# Patient Record
Sex: FEMALE | Race: WHITE | NOT HISPANIC OR LATINO | Employment: OTHER | URBAN - METROPOLITAN AREA
[De-identification: names, ages, dates, MRNs, and addresses within clinical notes are randomized per-mention and may not be internally consistent; named-entity substitution may affect disease eponyms.]

---

## 2022-10-18 ENCOUNTER — APPOINTMENT (OUTPATIENT)
Dept: CT IMAGING | Facility: HOSPITAL | Age: 82
End: 2022-10-18

## 2022-10-18 ENCOUNTER — HOSPITAL ENCOUNTER (INPATIENT)
Facility: HOSPITAL | Age: 82
LOS: 15 days | Discharge: NURSING, REHAB OR LONG-TERM CARE | End: 2022-11-02
Attending: EMERGENCY MEDICINE | Admitting: INTERNAL MEDICINE

## 2022-10-18 ENCOUNTER — APPOINTMENT (OUTPATIENT)
Dept: GENERAL RADIOLOGY | Facility: HOSPITAL | Age: 82
End: 2022-10-18

## 2022-10-18 DIAGNOSIS — G93.40 ACUTE ENCEPHALOPATHY: ICD-10-CM

## 2022-10-18 DIAGNOSIS — A41.9 SEPSIS, DUE TO UNSPECIFIED ORGANISM, UNSPECIFIED WHETHER ACUTE ORGAN DYSFUNCTION PRESENT: ICD-10-CM

## 2022-10-18 DIAGNOSIS — E22.2 SIADH (SYNDROME OF INAPPROPRIATE ADH PRODUCTION): ICD-10-CM

## 2022-10-18 DIAGNOSIS — E87.1 HYPONATREMIA: Primary | ICD-10-CM

## 2022-10-18 PROBLEM — R80.9 PROTEINURIA: Status: ACTIVE | Noted: 2022-10-18

## 2022-10-18 PROBLEM — E88.09 HYPOALBUMINEMIA: Status: ACTIVE | Noted: 2022-10-18

## 2022-10-18 PROBLEM — D72.829 LEUKOCYTOSIS: Status: ACTIVE | Noted: 2022-10-18

## 2022-10-18 PROBLEM — R74.8 ELEVATED LIVER ENZYMES: Status: ACTIVE | Noted: 2022-10-18

## 2022-10-18 PROBLEM — I48.0 PAROXYSMAL ATRIAL FIBRILLATION: Status: ACTIVE | Noted: 2022-10-18

## 2022-10-18 PROBLEM — I10 ESSENTIAL HYPERTENSION: Status: ACTIVE | Noted: 2022-10-18

## 2022-10-18 PROBLEM — N39.0 UTI (URINARY TRACT INFECTION): Status: ACTIVE | Noted: 2022-10-18

## 2022-10-18 LAB
ALBUMIN SERPL-MCNC: 2.7 G/DL (ref 3.5–5.2)
ALBUMIN/GLOB SERPL: 0.8 G/DL
ALP SERPL-CCNC: 126 U/L (ref 39–117)
ALT SERPL W P-5'-P-CCNC: 82 U/L (ref 1–33)
ANION GAP SERPL CALCULATED.3IONS-SCNC: 15 MMOL/L (ref 5–15)
AST SERPL-CCNC: 48 U/L (ref 1–32)
BACTERIA UR QL AUTO: ABNORMAL /HPF
BASOPHILS # BLD AUTO: 0.03 10*3/MM3 (ref 0–0.2)
BASOPHILS NFR BLD AUTO: 0.2 % (ref 0–1.5)
BILIRUB SERPL-MCNC: 1 MG/DL (ref 0–1.2)
BILIRUB UR QL STRIP: NEGATIVE
BUN SERPL-MCNC: 10 MG/DL (ref 8–23)
BUN/CREAT SERPL: 22.2 (ref 7–25)
BURR CELLS BLD QL SMEAR: NORMAL
CALCIUM SPEC-SCNC: 8.3 MG/DL (ref 8.6–10.5)
CHLORIDE SERPL-SCNC: 79 MMOL/L (ref 98–107)
CLARITY UR: ABNORMAL
CLUMPED PLATELETS: PRESENT
CO2 SERPL-SCNC: 23 MMOL/L (ref 22–29)
COLOR UR: ABNORMAL
CREAT SERPL-MCNC: 0.45 MG/DL (ref 0.57–1)
D-LACTATE SERPL-SCNC: 1.6 MMOL/L (ref 0.5–2)
DEPRECATED RDW RBC AUTO: 45.6 FL (ref 37–54)
EGFRCR SERPLBLD CKD-EPI 2021: 97.4 ML/MIN/1.73
EOSINOPHIL # BLD AUTO: 0.28 10*3/MM3 (ref 0–0.4)
EOSINOPHIL NFR BLD AUTO: 2.2 % (ref 0.3–6.2)
ERYTHROCYTE [DISTWIDTH] IN BLOOD BY AUTOMATED COUNT: 15.9 % (ref 12.3–15.4)
FLUAV SUBTYP SPEC NAA+PROBE: NOT DETECTED
FLUBV RNA ISLT QL NAA+PROBE: NOT DETECTED
GLOBULIN UR ELPH-MCNC: 3.3 GM/DL
GLUCOSE BLDC GLUCOMTR-MCNC: 93 MG/DL (ref 70–130)
GLUCOSE SERPL-MCNC: 106 MG/DL (ref 65–99)
GLUCOSE UR STRIP-MCNC: NEGATIVE MG/DL
HCT VFR BLD AUTO: 40.5 % (ref 34–46.6)
HGB BLD-MCNC: 14.1 G/DL (ref 12–15.9)
HGB UR QL STRIP.AUTO: ABNORMAL
HOLD SPECIMEN: NORMAL
HYALINE CASTS UR QL AUTO: ABNORMAL /LPF
IMM GRANULOCYTES # BLD AUTO: 0.13 10*3/MM3 (ref 0–0.05)
IMM GRANULOCYTES NFR BLD AUTO: 1 % (ref 0–0.5)
KETONES UR QL STRIP: ABNORMAL
LARGE PLATELETS: NORMAL
LEUKOCYTE ESTERASE UR QL STRIP.AUTO: ABNORMAL
LYMPHOCYTES # BLD AUTO: 0.95 10*3/MM3 (ref 0.7–3.1)
LYMPHOCYTES NFR BLD AUTO: 7.5 % (ref 19.6–45.3)
MCH RBC QN AUTO: 27.4 PG (ref 26.6–33)
MCHC RBC AUTO-ENTMCNC: 34.8 G/DL (ref 31.5–35.7)
MCV RBC AUTO: 78.8 FL (ref 79–97)
MONOCYTES # BLD AUTO: 0.4 10*3/MM3 (ref 0.1–0.9)
MONOCYTES NFR BLD AUTO: 3.1 % (ref 5–12)
NEUTROPHILS NFR BLD AUTO: 10.92 10*3/MM3 (ref 1.7–7)
NEUTROPHILS NFR BLD AUTO: 86 % (ref 42.7–76)
NITRITE UR QL STRIP: POSITIVE
NRBC BLD AUTO-RTO: 0 /100 WBC (ref 0–0.2)
OSMOLALITY SERPL: 243 MOSM/KG (ref 275–295)
OSMOLALITY UR: 731 MOSM/KG (ref 300–1100)
PH UR STRIP.AUTO: 7.5 [PH] (ref 5–8)
PLATELET # BLD AUTO: 209 10*3/MM3 (ref 140–450)
PMV BLD AUTO: 9.2 FL (ref 6–12)
POTASSIUM SERPL-SCNC: 3.6 MMOL/L (ref 3.5–5.2)
POTASSIUM SERPL-SCNC: 3.8 MMOL/L (ref 3.5–5.2)
PROCALCITONIN SERPL-MCNC: 0.3 NG/ML (ref 0–0.25)
PROT SERPL-MCNC: 6 G/DL (ref 6–8.5)
PROT UR QL STRIP: ABNORMAL
QT INTERVAL: 356 MS
QTC INTERVAL: 420 MS
RBC # BLD AUTO: 5.14 10*6/MM3 (ref 3.77–5.28)
RBC # UR STRIP: ABNORMAL /HPF
REF LAB TEST METHOD: ABNORMAL
RENAL EPI CELLS #/AREA URNS HPF: ABNORMAL /HPF
SARS-COV-2 RNA PNL SPEC NAA+PROBE: NOT DETECTED
SMALL PLATELETS BLD QL SMEAR: ADEQUATE
SODIUM SERPL-SCNC: 117 MMOL/L (ref 136–145)
SODIUM UR-SCNC: 55 MMOL/L
SP GR UR STRIP: 1.02 (ref 1–1.03)
SQUAMOUS #/AREA URNS HPF: ABNORMAL /HPF
TROPONIN T SERPL-MCNC: <0.01 NG/ML (ref 0–0.03)
UROBILINOGEN UR QL STRIP: ABNORMAL
WBC # UR STRIP: ABNORMAL /HPF
WBC MORPH BLD: NORMAL
WBC NRBC COR # BLD: 12.71 10*3/MM3 (ref 3.4–10.8)
WHOLE BLOOD HOLD COAG: NORMAL
WHOLE BLOOD HOLD SPECIMEN: NORMAL

## 2022-10-18 PROCEDURE — 25010000002 ENOXAPARIN PER 10 MG: Performed by: NURSE PRACTITIONER

## 2022-10-18 PROCEDURE — 99291 CRITICAL CARE FIRST HOUR: CPT | Performed by: INTERNAL MEDICINE

## 2022-10-18 PROCEDURE — 82962 GLUCOSE BLOOD TEST: CPT

## 2022-10-18 PROCEDURE — 83930 ASSAY OF BLOOD OSMOLALITY: CPT | Performed by: EMERGENCY MEDICINE

## 2022-10-18 PROCEDURE — 83935 ASSAY OF URINE OSMOLALITY: CPT | Performed by: EMERGENCY MEDICINE

## 2022-10-18 PROCEDURE — 87040 BLOOD CULTURE FOR BACTERIA: CPT | Performed by: EMERGENCY MEDICINE

## 2022-10-18 PROCEDURE — 87636 SARSCOV2 & INF A&B AMP PRB: CPT | Performed by: EMERGENCY MEDICINE

## 2022-10-18 PROCEDURE — 87077 CULTURE AEROBIC IDENTIFY: CPT | Performed by: EMERGENCY MEDICINE

## 2022-10-18 PROCEDURE — 80047 BASIC METABLC PNL IONIZED CA: CPT

## 2022-10-18 PROCEDURE — 25010000002 CEFTRIAXONE PER 250 MG: Performed by: NURSE PRACTITIONER

## 2022-10-18 PROCEDURE — 85007 BL SMEAR W/DIFF WBC COUNT: CPT | Performed by: EMERGENCY MEDICINE

## 2022-10-18 PROCEDURE — 84300 ASSAY OF URINE SODIUM: CPT | Performed by: EMERGENCY MEDICINE

## 2022-10-18 PROCEDURE — 80053 COMPREHEN METABOLIC PANEL: CPT | Performed by: EMERGENCY MEDICINE

## 2022-10-18 PROCEDURE — 70450 CT HEAD/BRAIN W/O DYE: CPT

## 2022-10-18 PROCEDURE — 85014 HEMATOCRIT: CPT

## 2022-10-18 PROCEDURE — 84484 ASSAY OF TROPONIN QUANT: CPT | Performed by: INTERNAL MEDICINE

## 2022-10-18 PROCEDURE — 84145 PROCALCITONIN (PCT): CPT | Performed by: EMERGENCY MEDICINE

## 2022-10-18 PROCEDURE — 36415 COLL VENOUS BLD VENIPUNCTURE: CPT

## 2022-10-18 PROCEDURE — 87150 DNA/RNA AMPLIFIED PROBE: CPT | Performed by: EMERGENCY MEDICINE

## 2022-10-18 PROCEDURE — 87186 SC STD MICRODIL/AGAR DIL: CPT | Performed by: EMERGENCY MEDICINE

## 2022-10-18 PROCEDURE — 93005 ELECTROCARDIOGRAM TRACING: CPT | Performed by: EMERGENCY MEDICINE

## 2022-10-18 PROCEDURE — 25010000002 CEFTRIAXONE PER 250 MG: Performed by: EMERGENCY MEDICINE

## 2022-10-18 PROCEDURE — 99285 EMERGENCY DEPT VISIT HI MDM: CPT

## 2022-10-18 PROCEDURE — 83605 ASSAY OF LACTIC ACID: CPT | Performed by: EMERGENCY MEDICINE

## 2022-10-18 PROCEDURE — 85025 COMPLETE CBC W/AUTO DIFF WBC: CPT | Performed by: EMERGENCY MEDICINE

## 2022-10-18 PROCEDURE — 84132 ASSAY OF SERUM POTASSIUM: CPT | Performed by: NURSE PRACTITIONER

## 2022-10-18 PROCEDURE — 71045 X-RAY EXAM CHEST 1 VIEW: CPT

## 2022-10-18 PROCEDURE — 81001 URINALYSIS AUTO W/SCOPE: CPT | Performed by: EMERGENCY MEDICINE

## 2022-10-18 PROCEDURE — 87147 CULTURE TYPE IMMUNOLOGIC: CPT | Performed by: EMERGENCY MEDICINE

## 2022-10-18 PROCEDURE — 87086 URINE CULTURE/COLONY COUNT: CPT | Performed by: EMERGENCY MEDICINE

## 2022-10-18 RX ORDER — ALBUTEROL SULFATE 2.5 MG/3ML
2.5 SOLUTION RESPIRATORY (INHALATION) EVERY 6 HOURS PRN
Status: DISCONTINUED | OUTPATIENT
Start: 2022-10-18 | End: 2022-11-02 | Stop reason: HOSPADM

## 2022-10-18 RX ORDER — SODIUM CHLORIDE 0.9 % (FLUSH) 0.9 %
10 SYRINGE (ML) INJECTION EVERY 12 HOURS SCHEDULED
Status: DISCONTINUED | OUTPATIENT
Start: 2022-10-18 | End: 2022-11-02 | Stop reason: HOSPADM

## 2022-10-18 RX ORDER — FUROSEMIDE 40 MG/1
1 TABLET ORAL DAILY
Status: ON HOLD | COMMUNITY
End: 2022-10-19

## 2022-10-18 RX ORDER — PANTOPRAZOLE SODIUM 40 MG/10ML
40 INJECTION, POWDER, LYOPHILIZED, FOR SOLUTION INTRAVENOUS
Status: DISCONTINUED | OUTPATIENT
Start: 2022-10-19 | End: 2022-10-22 | Stop reason: ALTCHOICE

## 2022-10-18 RX ORDER — MAGNESIUM SULFATE HEPTAHYDRATE 40 MG/ML
4 INJECTION, SOLUTION INTRAVENOUS AS NEEDED
Status: DISCONTINUED | OUTPATIENT
Start: 2022-10-18 | End: 2022-11-02 | Stop reason: HOSPADM

## 2022-10-18 RX ORDER — SODIUM CHLORIDE 9 MG/ML
150 INJECTION, SOLUTION INTRAVENOUS CONTINUOUS
Status: DISCONTINUED | OUTPATIENT
Start: 2022-10-18 | End: 2022-10-23

## 2022-10-18 RX ORDER — ONDANSETRON 4 MG/1
4 TABLET, FILM COATED ORAL EVERY 6 HOURS PRN
Status: DISCONTINUED | OUTPATIENT
Start: 2022-10-18 | End: 2022-11-02 | Stop reason: HOSPADM

## 2022-10-18 RX ORDER — ATORVASTATIN CALCIUM 20 MG/1
1 TABLET, FILM COATED ORAL DAILY
COMMUNITY

## 2022-10-18 RX ORDER — MAGNESIUM SULFATE HEPTAHYDRATE 40 MG/ML
2 INJECTION, SOLUTION INTRAVENOUS AS NEEDED
Status: DISCONTINUED | OUTPATIENT
Start: 2022-10-18 | End: 2022-11-02 | Stop reason: HOSPADM

## 2022-10-18 RX ORDER — FENTANYL/ROPIVACAINE/NS/PF 2-625MCG/1
15 PLASTIC BAG, INJECTION (ML) EPIDURAL AS NEEDED
Status: DISCONTINUED | OUTPATIENT
Start: 2022-10-18 | End: 2022-11-02 | Stop reason: HOSPADM

## 2022-10-18 RX ORDER — CALCIUM GLUCONATE 20 MG/ML
1 INJECTION, SOLUTION INTRAVENOUS AS NEEDED
Status: DISCONTINUED | OUTPATIENT
Start: 2022-10-18 | End: 2022-11-02 | Stop reason: HOSPADM

## 2022-10-18 RX ORDER — POTASSIUM CHLORIDE 750 MG/1
40 CAPSULE, EXTENDED RELEASE ORAL AS NEEDED
Status: DISCONTINUED | OUTPATIENT
Start: 2022-10-18 | End: 2022-11-02 | Stop reason: HOSPADM

## 2022-10-18 RX ORDER — ALBUTEROL SULFATE 90 UG/1
2 AEROSOL, METERED RESPIRATORY (INHALATION) EVERY 4 HOURS PRN
COMMUNITY

## 2022-10-18 RX ORDER — ENOXAPARIN SODIUM 100 MG/ML
40 INJECTION SUBCUTANEOUS EVERY 24 HOURS
Status: DISCONTINUED | OUTPATIENT
Start: 2022-10-18 | End: 2022-10-24

## 2022-10-18 RX ORDER — ONDANSETRON 2 MG/ML
4 INJECTION INTRAMUSCULAR; INTRAVENOUS EVERY 6 HOURS PRN
Status: DISCONTINUED | OUTPATIENT
Start: 2022-10-18 | End: 2022-11-02 | Stop reason: HOSPADM

## 2022-10-18 RX ORDER — PREDNISONE 10 MG/1
1 TABLET ORAL DAILY
Qty: 30 TABLET | Refills: 2 | COMMUNITY
Start: 2022-08-31 | End: 2022-11-02 | Stop reason: HOSPADM

## 2022-10-18 RX ORDER — METOPROLOL TARTRATE 5 MG/5ML
5 INJECTION INTRAVENOUS ONCE
Status: COMPLETED | OUTPATIENT
Start: 2022-10-18 | End: 2022-10-18

## 2022-10-18 RX ORDER — POTASSIUM CHLORIDE 1.5 G/1.77G
40 POWDER, FOR SOLUTION ORAL AS NEEDED
Status: DISCONTINUED | OUTPATIENT
Start: 2022-10-18 | End: 2022-11-02 | Stop reason: HOSPADM

## 2022-10-18 RX ORDER — DILTIAZEM HCL IN NACL,ISO-OSM 125 MG/125
5-15 PLASTIC BAG, INJECTION (ML) INTRAVENOUS CONTINUOUS
Status: DISCONTINUED | OUTPATIENT
Start: 2022-10-18 | End: 2022-10-20

## 2022-10-18 RX ORDER — VITAMIN B COMPLEX
1 CAPSULE ORAL DAILY
COMMUNITY

## 2022-10-18 RX ORDER — BISOPROLOL FUMARATE AND HYDROCHLOROTHIAZIDE 10; 6.25 MG/1; MG/1
1 TABLET ORAL DAILY
COMMUNITY
End: 2022-11-02 | Stop reason: HOSPADM

## 2022-10-18 RX ORDER — SODIUM CHLORIDE 0.9 % (FLUSH) 0.9 %
10 SYRINGE (ML) INJECTION AS NEEDED
Status: DISCONTINUED | OUTPATIENT
Start: 2022-10-18 | End: 2022-11-02 | Stop reason: HOSPADM

## 2022-10-18 RX ORDER — METHYLPREDNISOLONE SODIUM SUCCINATE 40 MG/ML
40 INJECTION, POWDER, LYOPHILIZED, FOR SOLUTION INTRAMUSCULAR; INTRAVENOUS EVERY 8 HOURS SCHEDULED
Status: DISCONTINUED | OUTPATIENT
Start: 2022-10-18 | End: 2022-10-20

## 2022-10-18 RX ORDER — POTASSIUM CHLORIDE 7.45 MG/ML
10 INJECTION INTRAVENOUS
Status: DISCONTINUED | OUTPATIENT
Start: 2022-10-18 | End: 2022-11-02 | Stop reason: HOSPADM

## 2022-10-18 RX ORDER — POTASSIUM CHLORIDE 750 MG/1
1 TABLET, EXTENDED RELEASE ORAL DAILY
COMMUNITY
End: 2022-11-02 | Stop reason: HOSPADM

## 2022-10-18 RX ORDER — FLUTICASONE PROPIONATE AND SALMETEROL 250; 50 UG/1; UG/1
1 POWDER RESPIRATORY (INHALATION) 2 TIMES DAILY
COMMUNITY

## 2022-10-18 RX ADMIN — ENOXAPARIN SODIUM 40 MG: 40 INJECTION SUBCUTANEOUS at 20:58

## 2022-10-18 RX ADMIN — SODIUM CHLORIDE 1 G: 900 INJECTION INTRAVENOUS at 20:58

## 2022-10-18 RX ADMIN — SODIUM CHLORIDE 100 ML/HR: 9 INJECTION, SOLUTION INTRAVENOUS at 23:41

## 2022-10-18 RX ADMIN — Medication 5 MG/HR: at 22:44

## 2022-10-18 RX ADMIN — SODIUM BICARBONATE 50 MEQ: 84 INJECTION INTRAVENOUS at 18:45

## 2022-10-18 RX ADMIN — SODIUM CHLORIDE 2 G: 900 INJECTION INTRAVENOUS at 18:45

## 2022-10-18 RX ADMIN — METOPROLOL TARTRATE 5 MG: 5 INJECTION INTRAVENOUS at 21:23

## 2022-10-19 ENCOUNTER — APPOINTMENT (OUTPATIENT)
Dept: CARDIOLOGY | Facility: HOSPITAL | Age: 82
End: 2022-10-19

## 2022-10-19 LAB
ANION GAP SERPL CALCULATED.3IONS-SCNC: 10 MMOL/L (ref 5–15)
BACTERIA BLD CULT: ABNORMAL
BH CV ECHO MEAS - AO MAX PG: 11.1 MMHG
BH CV ECHO MEAS - AO MEAN PG: 5.7 MMHG
BH CV ECHO MEAS - AO ROOT DIAM: 3.2 CM
BH CV ECHO MEAS - AO V2 MAX: 166.8 CM/SEC
BH CV ECHO MEAS - AO V2 VTI: 26.8 CM
BH CV ECHO MEAS - AVA(I,D): 2.45 CM2
BH CV ECHO MEAS - EDV(CUBED): 134.8 ML
BH CV ECHO MEAS - EDV(MOD-SP2): 84.2 ML
BH CV ECHO MEAS - EDV(MOD-SP4): 143 ML
BH CV ECHO MEAS - EF(MOD-BP): 55.5 %
BH CV ECHO MEAS - EF(MOD-SP2): 53.8 %
BH CV ECHO MEAS - EF(MOD-SP4): 60.8 %
BH CV ECHO MEAS - ESV(CUBED): 37.7 ML
BH CV ECHO MEAS - ESV(MOD-SP2): 38.9 ML
BH CV ECHO MEAS - ESV(MOD-SP4): 56.1 ML
BH CV ECHO MEAS - FS: 34.6 %
BH CV ECHO MEAS - IVS/LVPW: 1.18 CM
BH CV ECHO MEAS - IVSD: 0.75 CM
BH CV ECHO MEAS - LA DIMENSION: 2.8 CM
BH CV ECHO MEAS - LAT PEAK E' VEL: 9.8 CM/SEC
BH CV ECHO MEAS - LV DIASTOLIC VOL/BSA (35-75): 67.4 CM2
BH CV ECHO MEAS - LV MASS(C)D: 118.5 GRAMS
BH CV ECHO MEAS - LV MAX PG: 6.1 MMHG
BH CV ECHO MEAS - LV MEAN PG: 2.7 MMHG
BH CV ECHO MEAS - LV SYSTOLIC VOL/BSA (12-30): 26.4 CM2
BH CV ECHO MEAS - LV V1 MAX: 123 CM/SEC
BH CV ECHO MEAS - LV V1 VTI: 21.9 CM
BH CV ECHO MEAS - LVIDD: 5.1 CM
BH CV ECHO MEAS - LVIDS: 3.4 CM
BH CV ECHO MEAS - LVOT AREA: 3 CM2
BH CV ECHO MEAS - LVOT DIAM: 1.95 CM
BH CV ECHO MEAS - LVPWD: 0.64 CM
BH CV ECHO MEAS - MED PEAK E' VEL: 7.6 CM/SEC
BH CV ECHO MEAS - MV A MAX VEL: 53.6 CM/SEC
BH CV ECHO MEAS - MV DEC SLOPE: 396 CM/SEC2
BH CV ECHO MEAS - MV DEC TIME: 0.19 MSEC
BH CV ECHO MEAS - MV E MAX VEL: 76.7 CM/SEC
BH CV ECHO MEAS - MV E/A: 1.43
BH CV ECHO MEAS - PA ACC TIME: 0.07 SEC
BH CV ECHO MEAS - PA PR(ACCEL): 47.3 MMHG
BH CV ECHO MEAS - RAP SYSTOLE: 8 MMHG
BH CV ECHO MEAS - RVSP: 28 MMHG
BH CV ECHO MEAS - SI(MOD-SP2): 21.4 ML/M2
BH CV ECHO MEAS - SI(MOD-SP4): 41 ML/M2
BH CV ECHO MEAS - SV(LVOT): 65.6 ML
BH CV ECHO MEAS - SV(MOD-SP2): 45.3 ML
BH CV ECHO MEAS - SV(MOD-SP4): 86.9 ML
BH CV ECHO MEAS - TAPSE (>1.6): 1.36 CM
BH CV ECHO MEAS - TR MAX PG: 17.8 MMHG
BH CV ECHO MEAS - TR MAX VEL: 210.4 CM/SEC
BH CV ECHO MEASUREMENTS AVERAGE E/E' RATIO: 8.82
BH CV VAS BP RIGHT ARM: NORMAL MMHG
BH CV XLRA - RV BASE: 3.8 CM
BH CV XLRA - RV LENGTH: 7.4 CM
BH CV XLRA - RV MID: 3.1 CM
BH CV XLRA - TDI S': 10 CM/SEC
BUN BLDA-MCNC: 9 MG/DL (ref 8–26)
BUN SERPL-MCNC: 10 MG/DL (ref 8–23)
BUN/CREAT SERPL: 24.4 (ref 7–25)
CA-I BLDA-SCNC: 1.07 MMOL/L (ref 1.2–1.32)
CA-I SERPL ISE-MCNC: 1.09 MMOL/L (ref 1.12–1.32)
CALCIUM SPEC-SCNC: 7.7 MG/DL (ref 8.6–10.5)
CHLORIDE BLDA-SCNC: 77 MMOL/L (ref 98–109)
CHLORIDE SERPL-SCNC: 83 MMOL/L (ref 98–107)
CO2 BLDA-SCNC: 27 MMOL/L (ref 24–29)
CO2 SERPL-SCNC: 26 MMOL/L (ref 22–29)
CREAT BLDA-MCNC: 0.4 MG/DL (ref 0.6–1.3)
CREAT SERPL-MCNC: 0.41 MG/DL (ref 0.57–1)
DEPRECATED RDW RBC AUTO: 46.5 FL (ref 37–54)
EGFRCR SERPLBLD CKD-EPI 2021: 100.2 ML/MIN/1.73
EGFRCR SERPLBLD CKD-EPI 2021: 99.6 ML/MIN/1.73
ERYTHROCYTE [DISTWIDTH] IN BLOOD BY AUTOMATED COUNT: 16.2 % (ref 12.3–15.4)
GLUCOSE BLDC GLUCOMTR-MCNC: 103 MG/DL (ref 70–130)
GLUCOSE BLDC GLUCOMTR-MCNC: 115 MG/DL (ref 70–130)
GLUCOSE BLDC GLUCOMTR-MCNC: 233 MG/DL (ref 70–130)
GLUCOSE BLDC GLUCOMTR-MCNC: 240 MG/DL (ref 70–130)
GLUCOSE BLDC GLUCOMTR-MCNC: 81 MG/DL (ref 70–130)
GLUCOSE SERPL-MCNC: 93 MG/DL (ref 65–99)
HCT VFR BLD AUTO: 34.4 % (ref 34–46.6)
HCT VFR BLDA CALC: 44 % (ref 38–51)
HGB BLD-MCNC: 12 G/DL (ref 12–15.9)
HGB BLDA-MCNC: 15 G/DL (ref 12–17)
LEFT ATRIUM VOLUME INDEX: 23.4 ML/M2
LV EF 2D ECHO EST: 55 %
MAGNESIUM SERPL-MCNC: 2.4 MG/DL (ref 1.6–2.4)
MAXIMAL PREDICTED HEART RATE: 140 BPM
MCH RBC QN AUTO: 27.4 PG (ref 26.6–33)
MCHC RBC AUTO-ENTMCNC: 34.9 G/DL (ref 31.5–35.7)
MCV RBC AUTO: 78.5 FL (ref 79–97)
PHOSPHATE SERPL-MCNC: 3.2 MG/DL (ref 2.5–4.5)
PLATELET # BLD AUTO: 209 10*3/MM3 (ref 140–450)
PMV BLD AUTO: 9.4 FL (ref 6–12)
POTASSIUM BLDA-SCNC: 3.7 MMOL/L (ref 3.5–4.9)
POTASSIUM SERPL-SCNC: 3.4 MMOL/L (ref 3.5–5.2)
POTASSIUM SERPL-SCNC: 4.1 MMOL/L (ref 3.5–5.2)
QT INTERVAL: 376 MS
QTC INTERVAL: 436 MS
RBC # BLD AUTO: 4.38 10*6/MM3 (ref 3.77–5.28)
SODIUM BLD-SCNC: 117 MMOL/L (ref 138–146)
SODIUM SERPL-SCNC: 116 MMOL/L (ref 136–145)
SODIUM SERPL-SCNC: 117 MMOL/L (ref 136–145)
SODIUM SERPL-SCNC: 117 MMOL/L (ref 136–145)
SODIUM SERPL-SCNC: 119 MMOL/L (ref 136–145)
STRESS TARGET HR: 119 BPM
T4 FREE SERPL-MCNC: 1.38 NG/DL (ref 0.93–1.7)
TSH SERPL DL<=0.05 MIU/L-ACNC: 1.83 UIU/ML (ref 0.27–4.2)
WBC NRBC COR # BLD: 11.5 10*3/MM3 (ref 3.4–10.8)

## 2022-10-19 PROCEDURE — 84100 ASSAY OF PHOSPHORUS: CPT | Performed by: NURSE PRACTITIONER

## 2022-10-19 PROCEDURE — 93010 ELECTROCARDIOGRAM REPORT: CPT | Performed by: INTERNAL MEDICINE

## 2022-10-19 PROCEDURE — 84439 ASSAY OF FREE THYROXINE: CPT | Performed by: NURSE PRACTITIONER

## 2022-10-19 PROCEDURE — 25010000002 ENOXAPARIN PER 10 MG: Performed by: NURSE PRACTITIONER

## 2022-10-19 PROCEDURE — 93005 ELECTROCARDIOGRAM TRACING: CPT | Performed by: INTERNAL MEDICINE

## 2022-10-19 PROCEDURE — 94640 AIRWAY INHALATION TREATMENT: CPT

## 2022-10-19 PROCEDURE — 84295 ASSAY OF SERUM SODIUM: CPT | Performed by: INTERNAL MEDICINE

## 2022-10-19 PROCEDURE — 87040 BLOOD CULTURE FOR BACTERIA: CPT

## 2022-10-19 PROCEDURE — 94799 UNLISTED PULMONARY SVC/PX: CPT

## 2022-10-19 PROCEDURE — 84295 ASSAY OF SERUM SODIUM: CPT | Performed by: NURSE PRACTITIONER

## 2022-10-19 PROCEDURE — 93306 TTE W/DOPPLER COMPLETE: CPT

## 2022-10-19 PROCEDURE — 94664 DEMO&/EVAL PT USE INHALER: CPT

## 2022-10-19 PROCEDURE — 99233 SBSQ HOSP IP/OBS HIGH 50: CPT | Performed by: INTERNAL MEDICINE

## 2022-10-19 PROCEDURE — 80048 BASIC METABOLIC PNL TOTAL CA: CPT | Performed by: NURSE PRACTITIONER

## 2022-10-19 PROCEDURE — 93005 ELECTROCARDIOGRAM TRACING: CPT | Performed by: NURSE PRACTITIONER

## 2022-10-19 PROCEDURE — 25010000002 METHYLPREDNISOLONE PER 40 MG: Performed by: INTERNAL MEDICINE

## 2022-10-19 PROCEDURE — 82330 ASSAY OF CALCIUM: CPT | Performed by: NURSE PRACTITIONER

## 2022-10-19 PROCEDURE — 85027 COMPLETE CBC AUTOMATED: CPT | Performed by: NURSE PRACTITIONER

## 2022-10-19 PROCEDURE — 82962 GLUCOSE BLOOD TEST: CPT

## 2022-10-19 PROCEDURE — 25010000002 CEFTRIAXONE PER 250 MG: Performed by: NURSE PRACTITIONER

## 2022-10-19 PROCEDURE — 83735 ASSAY OF MAGNESIUM: CPT | Performed by: NURSE PRACTITIONER

## 2022-10-19 PROCEDURE — 84443 ASSAY THYROID STIM HORMONE: CPT | Performed by: NURSE PRACTITIONER

## 2022-10-19 PROCEDURE — 93306 TTE W/DOPPLER COMPLETE: CPT | Performed by: INTERNAL MEDICINE

## 2022-10-19 PROCEDURE — 84132 ASSAY OF SERUM POTASSIUM: CPT | Performed by: NURSE PRACTITIONER

## 2022-10-19 RX ORDER — ATORVASTATIN CALCIUM 20 MG/1
20 TABLET, FILM COATED ORAL DAILY
Status: DISCONTINUED | OUTPATIENT
Start: 2022-10-19 | End: 2022-11-02 | Stop reason: HOSPADM

## 2022-10-19 RX ORDER — BUDESONIDE AND FORMOTEROL FUMARATE DIHYDRATE 160; 4.5 UG/1; UG/1
2 AEROSOL RESPIRATORY (INHALATION)
Status: DISCONTINUED | OUTPATIENT
Start: 2022-10-19 | End: 2022-11-02 | Stop reason: HOSPADM

## 2022-10-19 RX ORDER — BISOPROLOL FUMARATE 5 MG/1
10 TABLET, FILM COATED ORAL
Status: DISCONTINUED | OUTPATIENT
Start: 2022-10-19 | End: 2022-11-02 | Stop reason: HOSPADM

## 2022-10-19 RX ORDER — ACETAMINOPHEN 325 MG/1
650 TABLET ORAL EVERY 6 HOURS PRN
Status: DISCONTINUED | OUTPATIENT
Start: 2022-10-19 | End: 2022-10-27

## 2022-10-19 RX ORDER — IPRATROPIUM BROMIDE AND ALBUTEROL SULFATE 2.5; .5 MG/3ML; MG/3ML
3 SOLUTION RESPIRATORY (INHALATION)
Status: DISCONTINUED | OUTPATIENT
Start: 2022-10-19 | End: 2022-10-24

## 2022-10-19 RX ADMIN — ATORVASTATIN CALCIUM 20 MG: 20 TABLET, FILM COATED ORAL at 10:30

## 2022-10-19 RX ADMIN — SODIUM CHLORIDE 1 G: 900 INJECTION INTRAVENOUS at 21:23

## 2022-10-19 RX ADMIN — IPRATROPIUM BROMIDE AND ALBUTEROL SULFATE 3 ML: .5; 3 SOLUTION RESPIRATORY (INHALATION) at 19:39

## 2022-10-19 RX ADMIN — METHYLPREDNISOLONE SODIUM SUCCINATE 40 MG: 40 INJECTION, POWDER, FOR SOLUTION INTRAMUSCULAR; INTRAVENOUS at 14:49

## 2022-10-19 RX ADMIN — ACETAMINOPHEN 650 MG: 325 TABLET, FILM COATED ORAL at 14:55

## 2022-10-19 RX ADMIN — SODIUM CHLORIDE 100 ML/HR: 9 INJECTION, SOLUTION INTRAVENOUS at 09:08

## 2022-10-19 RX ADMIN — BUDESONIDE AND FORMOTEROL FUMARATE DIHYDRATE 2 PUFF: 160; 4.5 AEROSOL RESPIRATORY (INHALATION) at 10:30

## 2022-10-19 RX ADMIN — IPRATROPIUM BROMIDE AND ALBUTEROL SULFATE 3 ML: .5; 3 SOLUTION RESPIRATORY (INHALATION) at 10:30

## 2022-10-19 RX ADMIN — BISOPROLOL FUMARATE 10 MG: 5 TABLET ORAL at 10:30

## 2022-10-19 RX ADMIN — PANTOPRAZOLE SODIUM 40 MG: 40 INJECTION, POWDER, FOR SOLUTION INTRAVENOUS at 05:30

## 2022-10-19 RX ADMIN — POTASSIUM CHLORIDE 40 MEQ: 750 CAPSULE, EXTENDED RELEASE ORAL at 10:54

## 2022-10-19 RX ADMIN — METHYLPREDNISOLONE SODIUM SUCCINATE 40 MG: 40 INJECTION, POWDER, FOR SOLUTION INTRAMUSCULAR; INTRAVENOUS at 05:30

## 2022-10-19 RX ADMIN — BUDESONIDE AND FORMOTEROL FUMARATE DIHYDRATE 2 PUFF: 160; 4.5 AEROSOL RESPIRATORY (INHALATION) at 19:40

## 2022-10-19 RX ADMIN — IPRATROPIUM BROMIDE AND ALBUTEROL SULFATE 3 ML: .5; 3 SOLUTION RESPIRATORY (INHALATION) at 16:59

## 2022-10-19 RX ADMIN — SODIUM CHLORIDE 100 ML/HR: 9 INJECTION, SOLUTION INTRAVENOUS at 18:02

## 2022-10-19 RX ADMIN — ENOXAPARIN SODIUM 40 MG: 40 INJECTION SUBCUTANEOUS at 21:23

## 2022-10-19 RX ADMIN — METHYLPREDNISOLONE SODIUM SUCCINATE 40 MG: 40 INJECTION, POWDER, FOR SOLUTION INTRAMUSCULAR; INTRAVENOUS at 21:23

## 2022-10-19 RX ADMIN — POTASSIUM CHLORIDE 40 MEQ: 750 CAPSULE, EXTENDED RELEASE ORAL at 14:49

## 2022-10-20 LAB
ANION GAP SERPL CALCULATED.3IONS-SCNC: 7 MMOL/L (ref 5–15)
BUN SERPL-MCNC: 14 MG/DL (ref 8–23)
BUN/CREAT SERPL: 37.8 (ref 7–25)
CALCIUM SPEC-SCNC: 8.1 MG/DL (ref 8.6–10.5)
CHLORIDE SERPL-SCNC: 86 MMOL/L (ref 98–107)
CO2 SERPL-SCNC: 23 MMOL/L (ref 22–29)
CREAT SERPL-MCNC: 0.37 MG/DL (ref 0.57–1)
DEPRECATED RDW RBC AUTO: 46.2 FL (ref 37–54)
EGFRCR SERPLBLD CKD-EPI 2021: 102.1 ML/MIN/1.73
ERYTHROCYTE [DISTWIDTH] IN BLOOD BY AUTOMATED COUNT: 15.9 % (ref 12.3–15.4)
GLUCOSE BLDC GLUCOMTR-MCNC: 136 MG/DL (ref 70–130)
GLUCOSE BLDC GLUCOMTR-MCNC: 156 MG/DL (ref 70–130)
GLUCOSE BLDC GLUCOMTR-MCNC: 164 MG/DL (ref 70–130)
GLUCOSE BLDC GLUCOMTR-MCNC: 172 MG/DL (ref 70–130)
GLUCOSE SERPL-MCNC: 144 MG/DL (ref 65–99)
HCT VFR BLD AUTO: 32.8 % (ref 34–46.6)
HGB BLD-MCNC: 11.3 G/DL (ref 12–15.9)
MAGNESIUM SERPL-MCNC: 2 MG/DL (ref 1.6–2.4)
MCH RBC QN AUTO: 27.5 PG (ref 26.6–33)
MCHC RBC AUTO-ENTMCNC: 34.5 G/DL (ref 31.5–35.7)
MCV RBC AUTO: 79.8 FL (ref 79–97)
PHOSPHATE SERPL-MCNC: 2.1 MG/DL (ref 2.5–4.5)
PLATELET # BLD AUTO: 217 10*3/MM3 (ref 140–450)
PMV BLD AUTO: 9.6 FL (ref 6–12)
POTASSIUM SERPL-SCNC: 4.4 MMOL/L (ref 3.5–5.2)
QT INTERVAL: 406 MS
QT INTERVAL: 476 MS
QTC INTERVAL: 425 MS
QTC INTERVAL: 455 MS
RBC # BLD AUTO: 4.11 10*6/MM3 (ref 3.77–5.28)
SODIUM SERPL-SCNC: 116 MMOL/L (ref 136–145)
SODIUM SERPL-SCNC: 118 MMOL/L (ref 136–145)
SODIUM SERPL-SCNC: 121 MMOL/L (ref 136–145)
SODIUM SERPL-SCNC: 121 MMOL/L (ref 136–145)
WBC NRBC COR # BLD: 13.8 10*3/MM3 (ref 3.4–10.8)

## 2022-10-20 PROCEDURE — 94799 UNLISTED PULMONARY SVC/PX: CPT

## 2022-10-20 PROCEDURE — 85027 COMPLETE CBC AUTOMATED: CPT | Performed by: INTERNAL MEDICINE

## 2022-10-20 PROCEDURE — 99233 SBSQ HOSP IP/OBS HIGH 50: CPT | Performed by: INTERNAL MEDICINE

## 2022-10-20 PROCEDURE — 82962 GLUCOSE BLOOD TEST: CPT

## 2022-10-20 PROCEDURE — 97166 OT EVAL MOD COMPLEX 45 MIN: CPT

## 2022-10-20 PROCEDURE — 25010000002 ENOXAPARIN PER 10 MG: Performed by: NURSE PRACTITIONER

## 2022-10-20 PROCEDURE — 80048 BASIC METABOLIC PNL TOTAL CA: CPT | Performed by: INTERNAL MEDICINE

## 2022-10-20 PROCEDURE — 25010000002 CEFTRIAXONE PER 250 MG: Performed by: NURSE PRACTITIONER

## 2022-10-20 PROCEDURE — 84295 ASSAY OF SERUM SODIUM: CPT | Performed by: NURSE PRACTITIONER

## 2022-10-20 PROCEDURE — 83735 ASSAY OF MAGNESIUM: CPT | Performed by: INTERNAL MEDICINE

## 2022-10-20 PROCEDURE — 25010000002 METHYLPREDNISOLONE PER 40 MG: Performed by: INTERNAL MEDICINE

## 2022-10-20 PROCEDURE — 97530 THERAPEUTIC ACTIVITIES: CPT

## 2022-10-20 PROCEDURE — 84100 ASSAY OF PHOSPHORUS: CPT | Performed by: INTERNAL MEDICINE

## 2022-10-20 PROCEDURE — 94761 N-INVAS EAR/PLS OXIMETRY MLT: CPT

## 2022-10-20 PROCEDURE — 94664 DEMO&/EVAL PT USE INHALER: CPT

## 2022-10-20 PROCEDURE — 97162 PT EVAL MOD COMPLEX 30 MIN: CPT

## 2022-10-20 RX ORDER — PREDNISONE 20 MG/1
40 TABLET ORAL
Status: COMPLETED | OUTPATIENT
Start: 2022-10-21 | End: 2022-10-23

## 2022-10-20 RX ADMIN — SODIUM CHLORIDE 1 G: 900 INJECTION INTRAVENOUS at 21:16

## 2022-10-20 RX ADMIN — PANTOPRAZOLE SODIUM 40 MG: 40 INJECTION, POWDER, FOR SOLUTION INTRAVENOUS at 06:34

## 2022-10-20 RX ADMIN — METHYLPREDNISOLONE SODIUM SUCCINATE 40 MG: 40 INJECTION, POWDER, FOR SOLUTION INTRAMUSCULAR; INTRAVENOUS at 06:34

## 2022-10-20 RX ADMIN — BISOPROLOL FUMARATE 10 MG: 5 TABLET ORAL at 08:55

## 2022-10-20 RX ADMIN — IPRATROPIUM BROMIDE AND ALBUTEROL SULFATE 3 ML: .5; 3 SOLUTION RESPIRATORY (INHALATION) at 12:17

## 2022-10-20 RX ADMIN — ENOXAPARIN SODIUM 40 MG: 40 INJECTION SUBCUTANEOUS at 21:17

## 2022-10-20 RX ADMIN — Medication 1 APPLICATION: at 08:57

## 2022-10-20 RX ADMIN — Medication 10 ML: at 08:56

## 2022-10-20 RX ADMIN — SODIUM CHLORIDE 150 ML/HR: 9 INJECTION, SOLUTION INTRAVENOUS at 20:40

## 2022-10-20 RX ADMIN — Medication 1 APPLICATION: at 21:18

## 2022-10-20 RX ADMIN — IPRATROPIUM BROMIDE AND ALBUTEROL SULFATE 3 ML: .5; 3 SOLUTION RESPIRATORY (INHALATION) at 08:06

## 2022-10-20 RX ADMIN — ACETAMINOPHEN 650 MG: 325 TABLET, FILM COATED ORAL at 12:01

## 2022-10-20 RX ADMIN — IPRATROPIUM BROMIDE AND ALBUTEROL SULFATE 3 ML: .5; 3 SOLUTION RESPIRATORY (INHALATION) at 19:14

## 2022-10-20 RX ADMIN — BUDESONIDE AND FORMOTEROL FUMARATE DIHYDRATE 2 PUFF: 160; 4.5 AEROSOL RESPIRATORY (INHALATION) at 19:14

## 2022-10-20 RX ADMIN — ATORVASTATIN CALCIUM 20 MG: 20 TABLET, FILM COATED ORAL at 08:56

## 2022-10-20 RX ADMIN — BUDESONIDE AND FORMOTEROL FUMARATE DIHYDRATE 2 PUFF: 160; 4.5 AEROSOL RESPIRATORY (INHALATION) at 08:06

## 2022-10-20 RX ADMIN — SODIUM PHOSPHATE, MONOBASIC, MONOHYDRATE 15 MMOL: 276; 142 INJECTION, SOLUTION INTRAVENOUS at 15:34

## 2022-10-20 RX ADMIN — SODIUM CHLORIDE 100 ML/HR: 9 INJECTION, SOLUTION INTRAVENOUS at 04:30

## 2022-10-20 RX ADMIN — ACETAMINOPHEN 650 MG: 325 TABLET, FILM COATED ORAL at 01:12

## 2022-10-21 LAB
ANION GAP SERPL CALCULATED.3IONS-SCNC: 10 MMOL/L (ref 5–15)
BACTERIA SPEC AEROBE CULT: ABNORMAL
BUN SERPL-MCNC: 16 MG/DL (ref 8–23)
BUN/CREAT SERPL: 42.1 (ref 7–25)
CALCIUM SPEC-SCNC: 7.7 MG/DL (ref 8.6–10.5)
CHLORIDE SERPL-SCNC: 97 MMOL/L (ref 98–107)
CO2 SERPL-SCNC: 20 MMOL/L (ref 22–29)
CREAT SERPL-MCNC: 0.38 MG/DL (ref 0.57–1)
DEPRECATED RDW RBC AUTO: 48.6 FL (ref 37–54)
EGFRCR SERPLBLD CKD-EPI 2021: 101.4 ML/MIN/1.73
ERYTHROCYTE [DISTWIDTH] IN BLOOD BY AUTOMATED COUNT: 16.4 % (ref 12.3–15.4)
GLUCOSE BLDC GLUCOMTR-MCNC: 115 MG/DL (ref 70–130)
GLUCOSE BLDC GLUCOMTR-MCNC: 116 MG/DL (ref 70–130)
GLUCOSE BLDC GLUCOMTR-MCNC: 123 MG/DL (ref 70–130)
GLUCOSE BLDC GLUCOMTR-MCNC: 134 MG/DL (ref 70–130)
GLUCOSE SERPL-MCNC: 117 MG/DL (ref 65–99)
GRAM STN SPEC: ABNORMAL
HCT VFR BLD AUTO: 32 % (ref 34–46.6)
HGB BLD-MCNC: 10.7 G/DL (ref 12–15.9)
ISOLATED FROM: ABNORMAL
MAGNESIUM SERPL-MCNC: 2 MG/DL (ref 1.6–2.4)
MCH RBC QN AUTO: 27.4 PG (ref 26.6–33)
MCHC RBC AUTO-ENTMCNC: 33.4 G/DL (ref 31.5–35.7)
MCV RBC AUTO: 81.8 FL (ref 79–97)
PHOSPHATE SERPL-MCNC: 2.5 MG/DL (ref 2.5–4.5)
PLATELET # BLD AUTO: 267 10*3/MM3 (ref 140–450)
PMV BLD AUTO: 9.7 FL (ref 6–12)
POTASSIUM SERPL-SCNC: 4.7 MMOL/L (ref 3.5–5.2)
RBC # BLD AUTO: 3.91 10*6/MM3 (ref 3.77–5.28)
SODIUM SERPL-SCNC: 124 MMOL/L (ref 136–145)
SODIUM SERPL-SCNC: 125 MMOL/L (ref 136–145)
SODIUM SERPL-SCNC: 127 MMOL/L (ref 136–145)
SODIUM SERPL-SCNC: 127 MMOL/L (ref 136–145)
SODIUM SERPL-SCNC: 128 MMOL/L (ref 136–145)
WBC NRBC COR # BLD: 14.14 10*3/MM3 (ref 3.4–10.8)

## 2022-10-21 PROCEDURE — 94799 UNLISTED PULMONARY SVC/PX: CPT

## 2022-10-21 PROCEDURE — 25010000002 ENOXAPARIN PER 10 MG: Performed by: NURSE PRACTITIONER

## 2022-10-21 PROCEDURE — 84100 ASSAY OF PHOSPHORUS: CPT | Performed by: INTERNAL MEDICINE

## 2022-10-21 PROCEDURE — 25010000002 CEFTRIAXONE PER 250 MG: Performed by: NURSE PRACTITIONER

## 2022-10-21 PROCEDURE — 94761 N-INVAS EAR/PLS OXIMETRY MLT: CPT

## 2022-10-21 PROCEDURE — 85027 COMPLETE CBC AUTOMATED: CPT | Performed by: INTERNAL MEDICINE

## 2022-10-21 PROCEDURE — 84295 ASSAY OF SERUM SODIUM: CPT | Performed by: NURSE PRACTITIONER

## 2022-10-21 PROCEDURE — 63710000001 PREDNISONE PER 1 MG: Performed by: INTERNAL MEDICINE

## 2022-10-21 PROCEDURE — 80048 BASIC METABOLIC PNL TOTAL CA: CPT | Performed by: INTERNAL MEDICINE

## 2022-10-21 PROCEDURE — 82962 GLUCOSE BLOOD TEST: CPT

## 2022-10-21 PROCEDURE — 99232 SBSQ HOSP IP/OBS MODERATE 35: CPT | Performed by: INTERNAL MEDICINE

## 2022-10-21 PROCEDURE — 83735 ASSAY OF MAGNESIUM: CPT | Performed by: INTERNAL MEDICINE

## 2022-10-21 PROCEDURE — 84295 ASSAY OF SERUM SODIUM: CPT | Performed by: INTERNAL MEDICINE

## 2022-10-21 RX ADMIN — ATORVASTATIN CALCIUM 20 MG: 20 TABLET, FILM COATED ORAL at 09:47

## 2022-10-21 RX ADMIN — SODIUM CHLORIDE 150 ML/HR: 9 INJECTION, SOLUTION INTRAVENOUS at 09:50

## 2022-10-21 RX ADMIN — BUDESONIDE AND FORMOTEROL FUMARATE DIHYDRATE 2 PUFF: 160; 4.5 AEROSOL RESPIRATORY (INHALATION) at 19:52

## 2022-10-21 RX ADMIN — IPRATROPIUM BROMIDE AND ALBUTEROL SULFATE 3 ML: .5; 3 SOLUTION RESPIRATORY (INHALATION) at 08:46

## 2022-10-21 RX ADMIN — ENOXAPARIN SODIUM 40 MG: 40 INJECTION SUBCUTANEOUS at 20:35

## 2022-10-21 RX ADMIN — SODIUM PHOSPHATE, MONOBASIC, MONOHYDRATE 15 MMOL: 276; 142 INJECTION, SOLUTION INTRAVENOUS at 20:35

## 2022-10-21 RX ADMIN — SODIUM CHLORIDE 1 G: 900 INJECTION INTRAVENOUS at 20:36

## 2022-10-21 RX ADMIN — PREDNISONE 40 MG: 20 TABLET ORAL at 09:47

## 2022-10-21 RX ADMIN — IPRATROPIUM BROMIDE AND ALBUTEROL SULFATE 3 ML: .5; 3 SOLUTION RESPIRATORY (INHALATION) at 19:52

## 2022-10-21 RX ADMIN — Medication 1 APPLICATION: at 20:36

## 2022-10-21 RX ADMIN — Medication 10 ML: at 20:36

## 2022-10-21 RX ADMIN — IPRATROPIUM BROMIDE AND ALBUTEROL SULFATE 3 ML: .5; 3 SOLUTION RESPIRATORY (INHALATION) at 11:49

## 2022-10-21 RX ADMIN — ACETAMINOPHEN 650 MG: 325 TABLET, FILM COATED ORAL at 23:54

## 2022-10-21 RX ADMIN — Medication 1 APPLICATION: at 11:03

## 2022-10-21 RX ADMIN — SODIUM CHLORIDE 150 ML/HR: 9 INJECTION, SOLUTION INTRAVENOUS at 18:46

## 2022-10-21 RX ADMIN — BUDESONIDE AND FORMOTEROL FUMARATE DIHYDRATE 2 PUFF: 160; 4.5 AEROSOL RESPIRATORY (INHALATION) at 08:46

## 2022-10-21 RX ADMIN — ACETAMINOPHEN 650 MG: 325 TABLET, FILM COATED ORAL at 18:48

## 2022-10-21 RX ADMIN — PANTOPRAZOLE SODIUM 40 MG: 40 INJECTION, POWDER, FOR SOLUTION INTRAVENOUS at 06:17

## 2022-10-22 LAB
ANION GAP SERPL CALCULATED.3IONS-SCNC: 9 MMOL/L (ref 5–15)
BASOPHILS # BLD AUTO: 0.11 10*3/MM3 (ref 0–0.2)
BASOPHILS NFR BLD AUTO: 0.8 % (ref 0–1.5)
BUN SERPL-MCNC: 19 MG/DL (ref 8–23)
BUN/CREAT SERPL: 51.4 (ref 7–25)
BURR CELLS BLD QL SMEAR: NORMAL
CALCIUM SPEC-SCNC: 7.6 MG/DL (ref 8.6–10.5)
CHLORIDE SERPL-SCNC: 99 MMOL/L (ref 98–107)
CO2 SERPL-SCNC: 22 MMOL/L (ref 22–29)
CREAT SERPL-MCNC: 0.37 MG/DL (ref 0.57–1)
DEPRECATED RDW RBC AUTO: 50.8 FL (ref 37–54)
EGFRCR SERPLBLD CKD-EPI 2021: 102.1 ML/MIN/1.73
EOSINOPHIL # BLD AUTO: 0.01 10*3/MM3 (ref 0–0.4)
EOSINOPHIL NFR BLD AUTO: 0.1 % (ref 0.3–6.2)
ERYTHROCYTE [DISTWIDTH] IN BLOOD BY AUTOMATED COUNT: 16.9 % (ref 12.3–15.4)
GLUCOSE BLDC GLUCOMTR-MCNC: 106 MG/DL (ref 70–130)
GLUCOSE BLDC GLUCOMTR-MCNC: 132 MG/DL (ref 70–130)
GLUCOSE BLDC GLUCOMTR-MCNC: 78 MG/DL (ref 70–130)
GLUCOSE BLDC GLUCOMTR-MCNC: 88 MG/DL (ref 70–130)
GLUCOSE SERPL-MCNC: 91 MG/DL (ref 65–99)
HCT VFR BLD AUTO: 32.2 % (ref 34–46.6)
HGB BLD-MCNC: 10.7 G/DL (ref 12–15.9)
IMM GRANULOCYTES # BLD AUTO: 1.26 10*3/MM3 (ref 0–0.05)
IMM GRANULOCYTES NFR BLD AUTO: 9.2 % (ref 0–0.5)
LYMPHOCYTES # BLD AUTO: 1.68 10*3/MM3 (ref 0.7–3.1)
LYMPHOCYTES NFR BLD AUTO: 12.3 % (ref 19.6–45.3)
MAGNESIUM SERPL-MCNC: 2 MG/DL (ref 1.6–2.4)
MCH RBC QN AUTO: 27.4 PG (ref 26.6–33)
MCHC RBC AUTO-ENTMCNC: 33.2 G/DL (ref 31.5–35.7)
MCV RBC AUTO: 82.6 FL (ref 79–97)
MONOCYTES # BLD AUTO: 0.82 10*3/MM3 (ref 0.1–0.9)
MONOCYTES NFR BLD AUTO: 6 % (ref 5–12)
NEUTROPHILS NFR BLD AUTO: 71.6 % (ref 42.7–76)
NEUTROPHILS NFR BLD AUTO: 9.83 10*3/MM3 (ref 1.7–7)
NRBC BLD AUTO-RTO: 0 /100 WBC (ref 0–0.2)
PHOSPHATE SERPL-MCNC: 2.9 MG/DL (ref 2.5–4.5)
PLAT MORPH BLD: NORMAL
PLATELET # BLD AUTO: 286 10*3/MM3 (ref 140–450)
PMV BLD AUTO: 9.4 FL (ref 6–12)
POTASSIUM SERPL-SCNC: 4.7 MMOL/L (ref 3.5–5.2)
RBC # BLD AUTO: 3.9 10*6/MM3 (ref 3.77–5.28)
SODIUM SERPL-SCNC: 130 MMOL/L (ref 136–145)
WBC MORPH BLD: NORMAL
WBC NRBC COR # BLD: 13.71 10*3/MM3 (ref 3.4–10.8)

## 2022-10-22 PROCEDURE — 94664 DEMO&/EVAL PT USE INHALER: CPT

## 2022-10-22 PROCEDURE — 83735 ASSAY OF MAGNESIUM: CPT | Performed by: INTERNAL MEDICINE

## 2022-10-22 PROCEDURE — 85025 COMPLETE CBC W/AUTO DIFF WBC: CPT | Performed by: INTERNAL MEDICINE

## 2022-10-22 PROCEDURE — 82962 GLUCOSE BLOOD TEST: CPT

## 2022-10-22 PROCEDURE — 94799 UNLISTED PULMONARY SVC/PX: CPT

## 2022-10-22 PROCEDURE — 80048 BASIC METABOLIC PNL TOTAL CA: CPT | Performed by: INTERNAL MEDICINE

## 2022-10-22 PROCEDURE — 63710000001 PREDNISONE PER 1 MG: Performed by: INTERNAL MEDICINE

## 2022-10-22 PROCEDURE — 84100 ASSAY OF PHOSPHORUS: CPT | Performed by: INTERNAL MEDICINE

## 2022-10-22 PROCEDURE — 25010000002 ENOXAPARIN PER 10 MG: Performed by: INTERNAL MEDICINE

## 2022-10-22 PROCEDURE — 25010000002 ONDANSETRON PER 1 MG: Performed by: INTERNAL MEDICINE

## 2022-10-22 PROCEDURE — 25010000002 CEFTRIAXONE PER 250 MG: Performed by: INTERNAL MEDICINE

## 2022-10-22 PROCEDURE — 94761 N-INVAS EAR/PLS OXIMETRY MLT: CPT

## 2022-10-22 PROCEDURE — 85007 BL SMEAR W/DIFF WBC COUNT: CPT | Performed by: INTERNAL MEDICINE

## 2022-10-22 PROCEDURE — 84295 ASSAY OF SERUM SODIUM: CPT | Performed by: INTERNAL MEDICINE

## 2022-10-22 PROCEDURE — 99233 SBSQ HOSP IP/OBS HIGH 50: CPT | Performed by: INTERNAL MEDICINE

## 2022-10-22 RX ORDER — SIMETHICONE 80 MG
80 TABLET,CHEWABLE ORAL 3 TIMES DAILY PRN
Status: DISCONTINUED | OUTPATIENT
Start: 2022-10-22 | End: 2022-10-31

## 2022-10-22 RX ORDER — PANTOPRAZOLE SODIUM 40 MG/1
40 TABLET, DELAYED RELEASE ORAL
Status: DISCONTINUED | OUTPATIENT
Start: 2022-10-23 | End: 2022-11-02 | Stop reason: HOSPADM

## 2022-10-22 RX ADMIN — Medication 10 ML: at 08:40

## 2022-10-22 RX ADMIN — ENOXAPARIN SODIUM 40 MG: 40 INJECTION SUBCUTANEOUS at 20:13

## 2022-10-22 RX ADMIN — SODIUM CHLORIDE 150 ML/HR: 9 INJECTION, SOLUTION INTRAVENOUS at 18:18

## 2022-10-22 RX ADMIN — Medication 1 APPLICATION: at 08:39

## 2022-10-22 RX ADMIN — ACETAMINOPHEN 650 MG: 325 TABLET, FILM COATED ORAL at 06:52

## 2022-10-22 RX ADMIN — ATORVASTATIN CALCIUM 20 MG: 20 TABLET, FILM COATED ORAL at 08:37

## 2022-10-22 RX ADMIN — IPRATROPIUM BROMIDE AND ALBUTEROL SULFATE 3 ML: .5; 3 SOLUTION RESPIRATORY (INHALATION) at 08:15

## 2022-10-22 RX ADMIN — BISOPROLOL FUMARATE 10 MG: 5 TABLET ORAL at 08:37

## 2022-10-22 RX ADMIN — PREDNISONE 40 MG: 20 TABLET ORAL at 08:37

## 2022-10-22 RX ADMIN — ONDANSETRON 4 MG: 2 INJECTION INTRAMUSCULAR; INTRAVENOUS at 19:46

## 2022-10-22 RX ADMIN — SODIUM CHLORIDE 1 G: 900 INJECTION INTRAVENOUS at 20:13

## 2022-10-22 RX ADMIN — IPRATROPIUM BROMIDE AND ALBUTEROL SULFATE 3 ML: .5; 3 SOLUTION RESPIRATORY (INHALATION) at 20:34

## 2022-10-22 RX ADMIN — ACETAMINOPHEN 650 MG: 325 TABLET, FILM COATED ORAL at 14:12

## 2022-10-22 RX ADMIN — IPRATROPIUM BROMIDE AND ALBUTEROL SULFATE 3 ML: .5; 3 SOLUTION RESPIRATORY (INHALATION) at 17:00

## 2022-10-22 RX ADMIN — IPRATROPIUM BROMIDE AND ALBUTEROL SULFATE 3 ML: .5; 3 SOLUTION RESPIRATORY (INHALATION) at 14:16

## 2022-10-22 RX ADMIN — SODIUM CHLORIDE 150 ML/HR: 9 INJECTION, SOLUTION INTRAVENOUS at 11:24

## 2022-10-22 RX ADMIN — BUDESONIDE AND FORMOTEROL FUMARATE DIHYDRATE 2 PUFF: 160; 4.5 AEROSOL RESPIRATORY (INHALATION) at 11:02

## 2022-10-22 RX ADMIN — SODIUM CHLORIDE 150 ML/HR: 9 INJECTION, SOLUTION INTRAVENOUS at 06:52

## 2022-10-22 RX ADMIN — BUDESONIDE AND FORMOTEROL FUMARATE DIHYDRATE 2 PUFF: 160; 4.5 AEROSOL RESPIRATORY (INHALATION) at 20:40

## 2022-10-22 RX ADMIN — Medication 10 ML: at 20:15

## 2022-10-22 RX ADMIN — PANTOPRAZOLE SODIUM 40 MG: 40 INJECTION, POWDER, FOR SOLUTION INTRAVENOUS at 06:52

## 2022-10-22 RX ADMIN — Medication 1 APPLICATION: at 20:16

## 2022-10-22 RX ADMIN — SIMETHICONE 80 MG: 80 TABLET, CHEWABLE ORAL at 20:44

## 2022-10-23 LAB
ANION GAP SERPL CALCULATED.3IONS-SCNC: 9 MMOL/L (ref 5–15)
BACTERIA SPEC AEROBE CULT: NORMAL
BASOPHILS # BLD MANUAL: 0 10*3/MM3 (ref 0–0.2)
BASOPHILS NFR BLD MANUAL: 0 % (ref 0–1.5)
BUN SERPL-MCNC: 16 MG/DL (ref 8–23)
BUN/CREAT SERPL: 32.7 (ref 7–25)
BURR CELLS BLD QL SMEAR: ABNORMAL
CALCIUM SPEC-SCNC: 8.1 MG/DL (ref 8.6–10.5)
CHLORIDE SERPL-SCNC: 97 MMOL/L (ref 98–107)
CO2 SERPL-SCNC: 22 MMOL/L (ref 22–29)
CREAT SERPL-MCNC: 0.49 MG/DL (ref 0.57–1)
DEPRECATED RDW RBC AUTO: 56.2 FL (ref 37–54)
EGFRCR SERPLBLD CKD-EPI 2021: 95.4 ML/MIN/1.73
EOSINOPHIL # BLD MANUAL: 0 10*3/MM3 (ref 0–0.4)
EOSINOPHIL NFR BLD MANUAL: 0 % (ref 0.3–6.2)
ERYTHROCYTE [DISTWIDTH] IN BLOOD BY AUTOMATED COUNT: 17.8 % (ref 12.3–15.4)
GLUCOSE BLDC GLUCOMTR-MCNC: 103 MG/DL (ref 70–130)
GLUCOSE BLDC GLUCOMTR-MCNC: 130 MG/DL (ref 70–130)
GLUCOSE BLDC GLUCOMTR-MCNC: 139 MG/DL (ref 70–130)
GLUCOSE BLDC GLUCOMTR-MCNC: 67 MG/DL (ref 70–130)
GLUCOSE BLDC GLUCOMTR-MCNC: 93 MG/DL (ref 70–130)
GLUCOSE SERPL-MCNC: 100 MG/DL (ref 65–99)
HCT VFR BLD AUTO: 37.7 % (ref 34–46.6)
HGB BLD-MCNC: 12.1 G/DL (ref 12–15.9)
LYMPHOCYTES # BLD MANUAL: 2.72 10*3/MM3 (ref 0.7–3.1)
LYMPHOCYTES NFR BLD MANUAL: 1 % (ref 5–12)
MCH RBC QN AUTO: 27.8 PG (ref 26.6–33)
MCHC RBC AUTO-ENTMCNC: 32.1 G/DL (ref 31.5–35.7)
MCV RBC AUTO: 86.7 FL (ref 79–97)
METAMYELOCYTES NFR BLD MANUAL: 1 % (ref 0–0)
MONOCYTES # BLD: 0.18 10*3/MM3 (ref 0.1–0.9)
MYELOCYTES NFR BLD MANUAL: 2 % (ref 0–0)
NEUTROPHILS # BLD AUTO: 14.67 10*3/MM3 (ref 1.7–7)
NEUTROPHILS NFR BLD MANUAL: 78 % (ref 42.7–76)
NEUTS BAND NFR BLD MANUAL: 3 % (ref 0–5)
NRBC SPEC MANUAL: 0 /100 WBC (ref 0–0.2)
OVALOCYTES BLD QL SMEAR: ABNORMAL
PLAT MORPH BLD: NORMAL
PLATELET # BLD AUTO: 262 10*3/MM3 (ref 140–450)
PMV BLD AUTO: 9.1 FL (ref 6–12)
POTASSIUM SERPL-SCNC: 4.4 MMOL/L (ref 3.5–5.2)
RBC # BLD AUTO: 4.35 10*6/MM3 (ref 3.77–5.28)
SODIUM SERPL-SCNC: 128 MMOL/L (ref 136–145)
SODIUM SERPL-SCNC: 133 MMOL/L (ref 136–145)
VARIANT LYMPHS NFR BLD MANUAL: 15 % (ref 19.6–45.3)
WBC MORPH BLD: NORMAL
WBC NRBC COR # BLD: 18.11 10*3/MM3 (ref 3.4–10.8)

## 2022-10-23 PROCEDURE — 63710000001 PREDNISONE PER 1 MG: Performed by: INTERNAL MEDICINE

## 2022-10-23 PROCEDURE — 94799 UNLISTED PULMONARY SVC/PX: CPT

## 2022-10-23 PROCEDURE — 99233 SBSQ HOSP IP/OBS HIGH 50: CPT | Performed by: INTERNAL MEDICINE

## 2022-10-23 PROCEDURE — 85025 COMPLETE CBC W/AUTO DIFF WBC: CPT | Performed by: INTERNAL MEDICINE

## 2022-10-23 PROCEDURE — 25010000002 CEFTRIAXONE PER 250 MG: Performed by: INTERNAL MEDICINE

## 2022-10-23 PROCEDURE — 80048 BASIC METABOLIC PNL TOTAL CA: CPT | Performed by: INTERNAL MEDICINE

## 2022-10-23 PROCEDURE — 84295 ASSAY OF SERUM SODIUM: CPT | Performed by: INTERNAL MEDICINE

## 2022-10-23 PROCEDURE — 25010000002 ENOXAPARIN PER 10 MG: Performed by: INTERNAL MEDICINE

## 2022-10-23 PROCEDURE — 94761 N-INVAS EAR/PLS OXIMETRY MLT: CPT

## 2022-10-23 PROCEDURE — 94664 DEMO&/EVAL PT USE INHALER: CPT

## 2022-10-23 PROCEDURE — 82962 GLUCOSE BLOOD TEST: CPT

## 2022-10-23 PROCEDURE — 85007 BL SMEAR W/DIFF WBC COUNT: CPT | Performed by: INTERNAL MEDICINE

## 2022-10-23 RX ORDER — BISACODYL 5 MG/1
10 TABLET, DELAYED RELEASE ORAL DAILY PRN
Status: DISCONTINUED | OUTPATIENT
Start: 2022-10-23 | End: 2022-10-27

## 2022-10-23 RX ORDER — AMOXICILLIN 250 MG
2 CAPSULE ORAL 2 TIMES DAILY
Status: DISCONTINUED | OUTPATIENT
Start: 2022-10-23 | End: 2022-10-30

## 2022-10-23 RX ORDER — PREDNISONE 10 MG/1
10 TABLET ORAL
Status: DISCONTINUED | OUTPATIENT
Start: 2022-10-24 | End: 2022-10-27

## 2022-10-23 RX ORDER — BISACODYL 10 MG
10 SUPPOSITORY, RECTAL RECTAL DAILY PRN
Status: DISCONTINUED | OUTPATIENT
Start: 2022-10-23 | End: 2022-10-30

## 2022-10-23 RX ORDER — POLYETHYLENE GLYCOL 3350 17 G/17G
17 POWDER, FOR SOLUTION ORAL DAILY
Status: DISCONTINUED | OUTPATIENT
Start: 2022-10-23 | End: 2022-10-26

## 2022-10-23 RX ADMIN — BISOPROLOL FUMARATE 10 MG: 5 TABLET ORAL at 08:13

## 2022-10-23 RX ADMIN — Medication 10 ML: at 08:13

## 2022-10-23 RX ADMIN — BUDESONIDE AND FORMOTEROL FUMARATE DIHYDRATE 2 PUFF: 160; 4.5 AEROSOL RESPIRATORY (INHALATION) at 20:39

## 2022-10-23 RX ADMIN — SENNOSIDES AND DOCUSATE SODIUM 2 TABLET: 50; 8.6 TABLET ORAL at 11:20

## 2022-10-23 RX ADMIN — SODIUM CHLORIDE 1 G: 900 INJECTION INTRAVENOUS at 21:21

## 2022-10-23 RX ADMIN — ENOXAPARIN SODIUM 40 MG: 40 INJECTION SUBCUTANEOUS at 21:20

## 2022-10-23 RX ADMIN — POLYETHYLENE GLYCOL 3350 17 G: 17 POWDER, FOR SOLUTION ORAL at 11:20

## 2022-10-23 RX ADMIN — Medication 10 ML: at 21:21

## 2022-10-23 RX ADMIN — Medication 1 APPLICATION: at 21:21

## 2022-10-23 RX ADMIN — ATORVASTATIN CALCIUM 20 MG: 20 TABLET, FILM COATED ORAL at 08:13

## 2022-10-23 RX ADMIN — IPRATROPIUM BROMIDE AND ALBUTEROL SULFATE 3 ML: .5; 3 SOLUTION RESPIRATORY (INHALATION) at 12:32

## 2022-10-23 RX ADMIN — IPRATROPIUM BROMIDE AND ALBUTEROL SULFATE 3 ML: .5; 3 SOLUTION RESPIRATORY (INHALATION) at 20:39

## 2022-10-23 RX ADMIN — PREDNISONE 40 MG: 20 TABLET ORAL at 08:13

## 2022-10-23 RX ADMIN — Medication 1 APPLICATION: at 08:13

## 2022-10-23 RX ADMIN — SIMETHICONE 80 MG: 80 TABLET, CHEWABLE ORAL at 08:13

## 2022-10-23 RX ADMIN — BUDESONIDE AND FORMOTEROL FUMARATE DIHYDRATE 2 PUFF: 160; 4.5 AEROSOL RESPIRATORY (INHALATION) at 12:32

## 2022-10-23 RX ADMIN — SENNOSIDES AND DOCUSATE SODIUM 2 TABLET: 50; 8.6 TABLET ORAL at 21:20

## 2022-10-23 RX ADMIN — ACETAMINOPHEN 650 MG: 325 TABLET, FILM COATED ORAL at 08:13

## 2022-10-23 RX ADMIN — PANTOPRAZOLE SODIUM 40 MG: 40 TABLET, DELAYED RELEASE ORAL at 06:17

## 2022-10-24 LAB
ANION GAP SERPL CALCULATED.3IONS-SCNC: 10 MMOL/L (ref 5–15)
ANISOCYTOSIS BLD QL: ABNORMAL
BACTERIA SPEC AEROBE CULT: NORMAL
BACTERIA SPEC AEROBE CULT: NORMAL
BASOPHILS # BLD MANUAL: 0 10*3/MM3 (ref 0–0.2)
BASOPHILS NFR BLD MANUAL: 0 % (ref 0–1.5)
BUN SERPL-MCNC: 16 MG/DL (ref 8–23)
BUN/CREAT SERPL: 37.2 (ref 7–25)
CALCIUM SPEC-SCNC: 8 MG/DL (ref 8.6–10.5)
CHLORIDE SERPL-SCNC: 93 MMOL/L (ref 98–107)
CO2 SERPL-SCNC: 26 MMOL/L (ref 22–29)
CREAT SERPL-MCNC: 0.43 MG/DL (ref 0.57–1)
DEPRECATED RDW RBC AUTO: 48.9 FL (ref 37–54)
EGFRCR SERPLBLD CKD-EPI 2021: 98.5 ML/MIN/1.73
EOSINOPHIL # BLD MANUAL: 0 10*3/MM3 (ref 0–0.4)
EOSINOPHIL NFR BLD MANUAL: 0 % (ref 0.3–6.2)
ERYTHROCYTE [DISTWIDTH] IN BLOOD BY AUTOMATED COUNT: 17.1 % (ref 12.3–15.4)
GLUCOSE BLDC GLUCOMTR-MCNC: 111 MG/DL (ref 70–130)
GLUCOSE BLDC GLUCOMTR-MCNC: 125 MG/DL (ref 70–130)
GLUCOSE BLDC GLUCOMTR-MCNC: 80 MG/DL (ref 70–130)
GLUCOSE BLDC GLUCOMTR-MCNC: 93 MG/DL (ref 70–130)
GLUCOSE SERPL-MCNC: 73 MG/DL (ref 65–99)
HCT VFR BLD AUTO: 35.7 % (ref 34–46.6)
HGB BLD-MCNC: 12.3 G/DL (ref 12–15.9)
LYMPHOCYTES # BLD MANUAL: 3.73 10*3/MM3 (ref 0.7–3.1)
LYMPHOCYTES NFR BLD MANUAL: 4 % (ref 5–12)
MCH RBC QN AUTO: 27.4 PG (ref 26.6–33)
MCHC RBC AUTO-ENTMCNC: 34.5 G/DL (ref 31.5–35.7)
MCV RBC AUTO: 79.5 FL (ref 79–97)
METAMYELOCYTES NFR BLD MANUAL: 1 % (ref 0–0)
MONOCYTES # BLD: 0.57 10*3/MM3 (ref 0.1–0.9)
MYELOCYTES NFR BLD MANUAL: 7 % (ref 0–0)
NEUTROPHILS # BLD AUTO: 8.88 10*3/MM3 (ref 1.7–7)
NEUTROPHILS NFR BLD MANUAL: 57 % (ref 42.7–76)
NEUTS BAND NFR BLD MANUAL: 5 % (ref 0–5)
PLAT MORPH BLD: NORMAL
PLATELET # BLD AUTO: 261 10*3/MM3 (ref 140–450)
PMV BLD AUTO: 9.2 FL (ref 6–12)
POTASSIUM SERPL-SCNC: 4.3 MMOL/L (ref 3.5–5.2)
RBC # BLD AUTO: 4.49 10*6/MM3 (ref 3.77–5.28)
SMUDGE CELLS BLD QL SMEAR: ABNORMAL
SODIUM SERPL-SCNC: 129 MMOL/L (ref 136–145)
VARIANT LYMPHS NFR BLD MANUAL: 26 % (ref 19.6–45.3)
WBC NRBC COR # BLD: 14.33 10*3/MM3 (ref 3.4–10.8)

## 2022-10-24 PROCEDURE — 99232 SBSQ HOSP IP/OBS MODERATE 35: CPT | Performed by: INTERNAL MEDICINE

## 2022-10-24 PROCEDURE — 80048 BASIC METABOLIC PNL TOTAL CA: CPT | Performed by: INTERNAL MEDICINE

## 2022-10-24 PROCEDURE — 85007 BL SMEAR W/DIFF WBC COUNT: CPT | Performed by: INTERNAL MEDICINE

## 2022-10-24 PROCEDURE — 25010000002 ENOXAPARIN PER 10 MG: Performed by: INTERNAL MEDICINE

## 2022-10-24 PROCEDURE — 63710000001 PREDNISONE PER 5 MG: Performed by: INTERNAL MEDICINE

## 2022-10-24 PROCEDURE — 25010000002 CEFTRIAXONE PER 250 MG: Performed by: INTERNAL MEDICINE

## 2022-10-24 PROCEDURE — 82962 GLUCOSE BLOOD TEST: CPT

## 2022-10-24 PROCEDURE — 85025 COMPLETE CBC W/AUTO DIFF WBC: CPT | Performed by: INTERNAL MEDICINE

## 2022-10-24 PROCEDURE — 94799 UNLISTED PULMONARY SVC/PX: CPT

## 2022-10-24 RX ORDER — IPRATROPIUM BROMIDE AND ALBUTEROL SULFATE 2.5; .5 MG/3ML; MG/3ML
3 SOLUTION RESPIRATORY (INHALATION) EVERY 4 HOURS PRN
Status: DISCONTINUED | OUTPATIENT
Start: 2022-10-24 | End: 2022-11-02 | Stop reason: HOSPADM

## 2022-10-24 RX ORDER — ENOXAPARIN SODIUM 100 MG/ML
40 INJECTION SUBCUTANEOUS EVERY 12 HOURS SCHEDULED
Status: DISCONTINUED | OUTPATIENT
Start: 2022-10-24 | End: 2022-10-30

## 2022-10-24 RX ADMIN — SENNOSIDES AND DOCUSATE SODIUM 2 TABLET: 50; 8.6 TABLET ORAL at 09:29

## 2022-10-24 RX ADMIN — Medication 10 ML: at 20:51

## 2022-10-24 RX ADMIN — SENNOSIDES AND DOCUSATE SODIUM 2 TABLET: 50; 8.6 TABLET ORAL at 20:50

## 2022-10-24 RX ADMIN — BISOPROLOL FUMARATE 10 MG: 5 TABLET ORAL at 09:36

## 2022-10-24 RX ADMIN — BUDESONIDE AND FORMOTEROL FUMARATE DIHYDRATE 2 PUFF: 160; 4.5 AEROSOL RESPIRATORY (INHALATION) at 19:41

## 2022-10-24 RX ADMIN — PREDNISONE 10 MG: 10 TABLET ORAL at 09:28

## 2022-10-24 RX ADMIN — POLYETHYLENE GLYCOL 3350 17 G: 17 POWDER, FOR SOLUTION ORAL at 09:28

## 2022-10-24 RX ADMIN — SODIUM CHLORIDE 1 G: 900 INJECTION INTRAVENOUS at 20:50

## 2022-10-24 RX ADMIN — PANTOPRAZOLE SODIUM 40 MG: 40 TABLET, DELAYED RELEASE ORAL at 05:50

## 2022-10-24 RX ADMIN — Medication 1 APPLICATION: at 16:05

## 2022-10-24 RX ADMIN — SIMETHICONE 80 MG: 80 TABLET, CHEWABLE ORAL at 15:36

## 2022-10-24 RX ADMIN — Medication 10 ML: at 09:30

## 2022-10-24 RX ADMIN — ENOXAPARIN SODIUM 40 MG: 40 INJECTION SUBCUTANEOUS at 20:50

## 2022-10-24 RX ADMIN — Medication 1 APPLICATION: at 20:52

## 2022-10-24 RX ADMIN — ENOXAPARIN SODIUM 40 MG: 40 INJECTION SUBCUTANEOUS at 11:59

## 2022-10-24 RX ADMIN — ATORVASTATIN CALCIUM 20 MG: 20 TABLET, FILM COATED ORAL at 09:29

## 2022-10-24 RX ADMIN — BISACODYL 10 MG: 5 TABLET, COATED ORAL at 15:36

## 2022-10-25 ENCOUNTER — APPOINTMENT (OUTPATIENT)
Dept: GENERAL RADIOLOGY | Facility: HOSPITAL | Age: 82
End: 2022-10-25

## 2022-10-25 LAB
ANION GAP SERPL CALCULATED.3IONS-SCNC: 11 MMOL/L (ref 5–15)
ANION GAP SERPL CALCULATED.3IONS-SCNC: 9 MMOL/L (ref 5–15)
BACTERIA UR QL AUTO: ABNORMAL /HPF
BASOPHILS # BLD MANUAL: 0 10*3/MM3 (ref 0–0.2)
BASOPHILS NFR BLD MANUAL: 0 % (ref 0–1.5)
BILIRUB UR QL STRIP: NEGATIVE
BUN SERPL-MCNC: 14 MG/DL (ref 8–23)
BUN SERPL-MCNC: 15 MG/DL (ref 8–23)
BUN/CREAT SERPL: 34.1 (ref 7–25)
BUN/CREAT SERPL: 35 (ref 7–25)
BURR CELLS BLD QL SMEAR: ABNORMAL
BURR CELLS BLD QL SMEAR: ABNORMAL
CALCIUM SPEC-SCNC: 7.9 MG/DL (ref 8.6–10.5)
CALCIUM SPEC-SCNC: 8.3 MG/DL (ref 8.6–10.5)
CHLORIDE SERPL-SCNC: 86 MMOL/L (ref 98–107)
CHLORIDE SERPL-SCNC: 89 MMOL/L (ref 98–107)
CLARITY UR: ABNORMAL
CO2 SERPL-SCNC: 25 MMOL/L (ref 22–29)
CO2 SERPL-SCNC: 28 MMOL/L (ref 22–29)
COLOR UR: YELLOW
CREAT SERPL-MCNC: 0.4 MG/DL (ref 0.57–1)
CREAT SERPL-MCNC: 0.44 MG/DL (ref 0.57–1)
DEPRECATED RDW RBC AUTO: 50.3 FL (ref 37–54)
EGFRCR SERPLBLD CKD-EPI 2021: 100.2 ML/MIN/1.73
EGFRCR SERPLBLD CKD-EPI 2021: 97.9 ML/MIN/1.73
EOSINOPHIL # BLD MANUAL: 0.5 10*3/MM3 (ref 0–0.4)
EOSINOPHIL NFR BLD MANUAL: 3 % (ref 0.3–6.2)
ERYTHROCYTE [DISTWIDTH] IN BLOOD BY AUTOMATED COUNT: 17 % (ref 12.3–15.4)
GLUCOSE BLDC GLUCOMTR-MCNC: 100 MG/DL (ref 70–130)
GLUCOSE BLDC GLUCOMTR-MCNC: 75 MG/DL (ref 70–130)
GLUCOSE SERPL-MCNC: 118 MG/DL (ref 65–99)
GLUCOSE SERPL-MCNC: 73 MG/DL (ref 65–99)
GLUCOSE UR STRIP-MCNC: NEGATIVE MG/DL
HCT VFR BLD AUTO: 36.7 % (ref 34–46.6)
HGB BLD-MCNC: 12.5 G/DL (ref 12–15.9)
HGB UR QL STRIP.AUTO: NEGATIVE
HYALINE CASTS UR QL AUTO: ABNORMAL /LPF
KETONES UR QL STRIP: NEGATIVE
LEUKOCYTE ESTERASE UR QL STRIP.AUTO: NEGATIVE
LYMPHOCYTES # BLD MANUAL: 3.15 10*3/MM3 (ref 0.7–3.1)
LYMPHOCYTES NFR BLD MANUAL: 5 % (ref 5–12)
MCH RBC QN AUTO: 28 PG (ref 26.6–33)
MCHC RBC AUTO-ENTMCNC: 34.1 G/DL (ref 31.5–35.7)
MCV RBC AUTO: 82.1 FL (ref 79–97)
METAMYELOCYTES NFR BLD MANUAL: 2 % (ref 0–0)
MONOCYTES # BLD: 0.83 10*3/MM3 (ref 0.1–0.9)
MYELOCYTES NFR BLD MANUAL: 1 % (ref 0–0)
NEUTROPHILS # BLD AUTO: 11.6 10*3/MM3 (ref 1.7–7)
NEUTROPHILS NFR BLD MANUAL: 60 % (ref 42.7–76)
NEUTS BAND NFR BLD MANUAL: 10 % (ref 0–5)
NITRITE UR QL STRIP: NEGATIVE
NRBC SPEC MANUAL: 1 /100 WBC (ref 0–0.2)
OSMOLALITY SERPL: 263 MOSM/KG (ref 275–295)
OSMOLALITY UR: 580 MOSM/KG (ref 300–1100)
PH UR STRIP.AUTO: 8 [PH] (ref 5–8)
PLAT MORPH BLD: NORMAL
PLATELET # BLD AUTO: 229 10*3/MM3 (ref 140–450)
PMV BLD AUTO: 9.6 FL (ref 6–12)
POLYCHROMASIA BLD QL SMEAR: ABNORMAL
POTASSIUM SERPL-SCNC: 4.2 MMOL/L (ref 3.5–5.2)
POTASSIUM SERPL-SCNC: 4.4 MMOL/L (ref 3.5–5.2)
PROCALCITONIN SERPL-MCNC: 0.14 NG/ML (ref 0–0.25)
PROT UR QL STRIP: NEGATIVE
RBC # BLD AUTO: 4.47 10*6/MM3 (ref 3.77–5.28)
RBC # UR STRIP: ABNORMAL /HPF
REF LAB TEST METHOD: ABNORMAL
SMUDGE CELLS BLD QL SMEAR: ABNORMAL
SODIUM SERPL-SCNC: 122 MMOL/L (ref 136–145)
SODIUM SERPL-SCNC: 126 MMOL/L (ref 136–145)
SODIUM UR-SCNC: 120 MMOL/L
SP GR UR STRIP: 1.02 (ref 1–1.03)
SQUAMOUS #/AREA URNS HPF: ABNORMAL /HPF
UROBILINOGEN UR QL STRIP: ABNORMAL
VARIANT LYMPHS NFR BLD MANUAL: 16 % (ref 19.6–45.3)
VARIANT LYMPHS NFR BLD MANUAL: 3 % (ref 0–5)
WBC # UR STRIP: ABNORMAL /HPF
WBC NRBC COR # BLD: 16.57 10*3/MM3 (ref 3.4–10.8)

## 2022-10-25 PROCEDURE — 71045 X-RAY EXAM CHEST 1 VIEW: CPT

## 2022-10-25 PROCEDURE — 80048 BASIC METABOLIC PNL TOTAL CA: CPT | Performed by: PHYSICIAN ASSISTANT

## 2022-10-25 PROCEDURE — 83930 ASSAY OF BLOOD OSMOLALITY: CPT | Performed by: PHYSICIAN ASSISTANT

## 2022-10-25 PROCEDURE — 84300 ASSAY OF URINE SODIUM: CPT | Performed by: PHYSICIAN ASSISTANT

## 2022-10-25 PROCEDURE — 25010000002 ENOXAPARIN PER 10 MG: Performed by: INTERNAL MEDICINE

## 2022-10-25 PROCEDURE — 85007 BL SMEAR W/DIFF WBC COUNT: CPT | Performed by: INTERNAL MEDICINE

## 2022-10-25 PROCEDURE — 97530 THERAPEUTIC ACTIVITIES: CPT

## 2022-10-25 PROCEDURE — 83935 ASSAY OF URINE OSMOLALITY: CPT | Performed by: PHYSICIAN ASSISTANT

## 2022-10-25 PROCEDURE — 99233 SBSQ HOSP IP/OBS HIGH 50: CPT | Performed by: PHYSICIAN ASSISTANT

## 2022-10-25 PROCEDURE — 63710000001 PREDNISONE PER 5 MG: Performed by: INTERNAL MEDICINE

## 2022-10-25 PROCEDURE — 94799 UNLISTED PULMONARY SVC/PX: CPT

## 2022-10-25 PROCEDURE — 85025 COMPLETE CBC W/AUTO DIFF WBC: CPT | Performed by: INTERNAL MEDICINE

## 2022-10-25 PROCEDURE — 82962 GLUCOSE BLOOD TEST: CPT

## 2022-10-25 PROCEDURE — 97535 SELF CARE MNGMENT TRAINING: CPT

## 2022-10-25 PROCEDURE — 97110 THERAPEUTIC EXERCISES: CPT

## 2022-10-25 PROCEDURE — 81001 URINALYSIS AUTO W/SCOPE: CPT | Performed by: PHYSICIAN ASSISTANT

## 2022-10-25 PROCEDURE — 80048 BASIC METABOLIC PNL TOTAL CA: CPT | Performed by: INTERNAL MEDICINE

## 2022-10-25 PROCEDURE — 84145 PROCALCITONIN (PCT): CPT | Performed by: PHYSICIAN ASSISTANT

## 2022-10-25 RX ORDER — CEFDINIR 300 MG/1
300 CAPSULE ORAL EVERY 12 HOURS SCHEDULED
Status: DISCONTINUED | OUTPATIENT
Start: 2022-10-25 | End: 2022-10-30

## 2022-10-25 RX ORDER — DOXYCYCLINE 100 MG/1
100 CAPSULE ORAL EVERY 12 HOURS SCHEDULED
Status: DISCONTINUED | OUTPATIENT
Start: 2022-10-25 | End: 2022-10-30

## 2022-10-25 RX ORDER — BISACODYL 5 MG/1
10 TABLET, DELAYED RELEASE ORAL ONCE
Status: COMPLETED | OUTPATIENT
Start: 2022-10-25 | End: 2022-10-25

## 2022-10-25 RX ADMIN — Medication 10 ML: at 09:49

## 2022-10-25 RX ADMIN — PREDNISONE 10 MG: 10 TABLET ORAL at 09:49

## 2022-10-25 RX ADMIN — Medication 1 APPLICATION: at 09:49

## 2022-10-25 RX ADMIN — CEFDINIR 300 MG: 300 CAPSULE ORAL at 21:50

## 2022-10-25 RX ADMIN — SIMETHICONE 80 MG: 80 TABLET, CHEWABLE ORAL at 05:50

## 2022-10-25 RX ADMIN — ENOXAPARIN SODIUM 40 MG: 40 INJECTION SUBCUTANEOUS at 09:49

## 2022-10-25 RX ADMIN — ATORVASTATIN CALCIUM 20 MG: 20 TABLET, FILM COATED ORAL at 09:49

## 2022-10-25 RX ADMIN — PANTOPRAZOLE SODIUM 40 MG: 40 TABLET, DELAYED RELEASE ORAL at 05:47

## 2022-10-25 RX ADMIN — ACETAMINOPHEN 650 MG: 325 TABLET, FILM COATED ORAL at 03:00

## 2022-10-25 RX ADMIN — BUDESONIDE AND FORMOTEROL FUMARATE DIHYDRATE 2 PUFF: 160; 4.5 AEROSOL RESPIRATORY (INHALATION) at 20:25

## 2022-10-25 RX ADMIN — POLYETHYLENE GLYCOL 3350 17 G: 17 POWDER, FOR SOLUTION ORAL at 09:49

## 2022-10-25 RX ADMIN — BISOPROLOL FUMARATE 10 MG: 5 TABLET ORAL at 09:50

## 2022-10-25 RX ADMIN — ACETAMINOPHEN 650 MG: 325 TABLET, FILM COATED ORAL at 18:23

## 2022-10-25 RX ADMIN — SENNOSIDES AND DOCUSATE SODIUM 2 TABLET: 50; 8.6 TABLET ORAL at 21:50

## 2022-10-25 RX ADMIN — Medication 1 APPLICATION: at 21:51

## 2022-10-25 RX ADMIN — SENNOSIDES AND DOCUSATE SODIUM 2 TABLET: 50; 8.6 TABLET ORAL at 09:49

## 2022-10-25 RX ADMIN — ENOXAPARIN SODIUM 40 MG: 40 INJECTION SUBCUTANEOUS at 21:51

## 2022-10-25 RX ADMIN — BISACODYL 10 MG: 5 TABLET, COATED ORAL at 11:47

## 2022-10-25 RX ADMIN — DOXYCYCLINE 100 MG: 100 CAPSULE ORAL at 21:50

## 2022-10-25 RX ADMIN — MAGNESIUM HYDROXIDE 15 ML: 2400 SUSPENSION ORAL at 11:47

## 2022-10-25 RX ADMIN — Medication 10 ML: at 21:51

## 2022-10-25 RX ADMIN — BISACODYL 10 MG: 10 SUPPOSITORY RECTAL at 09:53

## 2022-10-26 ENCOUNTER — APPOINTMENT (OUTPATIENT)
Dept: GENERAL RADIOLOGY | Facility: HOSPITAL | Age: 82
End: 2022-10-26

## 2022-10-26 LAB
ANION GAP SERPL CALCULATED.3IONS-SCNC: 13 MMOL/L (ref 5–15)
BUN SERPL-MCNC: 14 MG/DL (ref 8–23)
BUN/CREAT SERPL: 30.4 (ref 7–25)
CALCIUM SPEC-SCNC: 8.3 MG/DL (ref 8.6–10.5)
CHLORIDE SERPL-SCNC: 86 MMOL/L (ref 98–107)
CHOLEST SERPL-MCNC: 131 MG/DL (ref 0–200)
CO2 SERPL-SCNC: 23 MMOL/L (ref 22–29)
CORTIS SERPL-MCNC: 12.98 MCG/DL
CREAT SERPL-MCNC: 0.46 MG/DL (ref 0.57–1)
DEPRECATED RDW RBC AUTO: 50.3 FL (ref 37–54)
EGFRCR SERPLBLD CKD-EPI 2021: 96.9 ML/MIN/1.73
ERYTHROCYTE [DISTWIDTH] IN BLOOD BY AUTOMATED COUNT: 17.5 % (ref 12.3–15.4)
GLUCOSE SERPL-MCNC: 102 MG/DL (ref 65–99)
HCT VFR BLD AUTO: 38.7 % (ref 34–46.6)
HDLC SERPL-MCNC: 43 MG/DL (ref 40–60)
HGB BLD-MCNC: 13 G/DL (ref 12–15.9)
LDLC SERPL CALC-MCNC: 70 MG/DL (ref 0–100)
LDLC/HDLC SERPL: 1.61 {RATIO}
MCH RBC QN AUTO: 27 PG (ref 26.6–33)
MCHC RBC AUTO-ENTMCNC: 33.6 G/DL (ref 31.5–35.7)
MCV RBC AUTO: 80.5 FL (ref 79–97)
OSMOLALITY SERPL: 256 MOSM/KG (ref 275–295)
PLATELET # BLD AUTO: 221 10*3/MM3 (ref 140–450)
PMV BLD AUTO: 9.7 FL (ref 6–12)
POTASSIUM SERPL-SCNC: 4.7 MMOL/L (ref 3.5–5.2)
RBC # BLD AUTO: 4.81 10*6/MM3 (ref 3.77–5.28)
SODIUM SERPL-SCNC: 118 MMOL/L (ref 136–145)
SODIUM SERPL-SCNC: 121 MMOL/L (ref 136–145)
SODIUM SERPL-SCNC: 121 MMOL/L (ref 136–145)
SODIUM SERPL-SCNC: 122 MMOL/L (ref 136–145)
SODIUM SERPL-SCNC: 123 MMOL/L (ref 136–145)
TRIGL SERPL-MCNC: 93 MG/DL (ref 0–150)
VLDLC SERPL-MCNC: 18 MG/DL (ref 5–40)
WBC NRBC COR # BLD: 15.14 10*3/MM3 (ref 3.4–10.8)

## 2022-10-26 PROCEDURE — 25010000002 ENOXAPARIN PER 10 MG: Performed by: INTERNAL MEDICINE

## 2022-10-26 PROCEDURE — 84244 ASSAY OF RENIN: CPT | Performed by: INTERNAL MEDICINE

## 2022-10-26 PROCEDURE — 84295 ASSAY OF SERUM SODIUM: CPT | Performed by: STUDENT IN AN ORGANIZED HEALTH CARE EDUCATION/TRAINING PROGRAM

## 2022-10-26 PROCEDURE — 85027 COMPLETE CBC AUTOMATED: CPT | Performed by: PHYSICIAN ASSISTANT

## 2022-10-26 PROCEDURE — 63710000001 PREDNISONE PER 5 MG: Performed by: INTERNAL MEDICINE

## 2022-10-26 PROCEDURE — 80061 LIPID PANEL: CPT | Performed by: INTERNAL MEDICINE

## 2022-10-26 PROCEDURE — 94799 UNLISTED PULMONARY SVC/PX: CPT

## 2022-10-26 PROCEDURE — 82533 TOTAL CORTISOL: CPT | Performed by: INTERNAL MEDICINE

## 2022-10-26 PROCEDURE — 99232 SBSQ HOSP IP/OBS MODERATE 35: CPT | Performed by: PHYSICIAN ASSISTANT

## 2022-10-26 PROCEDURE — 80048 BASIC METABOLIC PNL TOTAL CA: CPT | Performed by: PHYSICIAN ASSISTANT

## 2022-10-26 PROCEDURE — 82088 ASSAY OF ALDOSTERONE: CPT | Performed by: INTERNAL MEDICINE

## 2022-10-26 PROCEDURE — 83930 ASSAY OF BLOOD OSMOLALITY: CPT | Performed by: INTERNAL MEDICINE

## 2022-10-26 PROCEDURE — 74018 RADEX ABDOMEN 1 VIEW: CPT

## 2022-10-26 RX ORDER — POLYETHYLENE GLYCOL 3350 17 G/17G
17 POWDER, FOR SOLUTION ORAL 2 TIMES DAILY
Status: DISCONTINUED | OUTPATIENT
Start: 2022-10-26 | End: 2022-10-27

## 2022-10-26 RX ORDER — LACTULOSE 10 G/15ML
20 SOLUTION ORAL 3 TIMES DAILY PRN
Status: DISCONTINUED | OUTPATIENT
Start: 2022-10-26 | End: 2022-10-27

## 2022-10-26 RX ORDER — MAGNESIUM CARB/ALUMINUM HYDROX 105-160MG
296 TABLET,CHEWABLE ORAL ONCE
Status: DISCONTINUED | OUTPATIENT
Start: 2022-10-26 | End: 2022-10-26

## 2022-10-26 RX ORDER — NIFEDIPINE 30 MG/1
30 TABLET, EXTENDED RELEASE ORAL
Status: DISCONTINUED | OUTPATIENT
Start: 2022-10-26 | End: 2022-11-02 | Stop reason: HOSPADM

## 2022-10-26 RX ORDER — SIMETHICONE 80 MG
160 TABLET,CHEWABLE ORAL ONCE
Status: COMPLETED | OUTPATIENT
Start: 2022-10-26 | End: 2022-10-26

## 2022-10-26 RX ORDER — SODIUM CHLORIDE 3 G/100ML
20 INJECTION, SOLUTION INTRAVENOUS ONCE
Status: COMPLETED | OUTPATIENT
Start: 2022-10-26 | End: 2022-10-26

## 2022-10-26 RX ADMIN — PREDNISONE 10 MG: 10 TABLET ORAL at 09:11

## 2022-10-26 RX ADMIN — NIFEDIPINE 30 MG: 30 TABLET, FILM COATED, EXTENDED RELEASE ORAL at 14:51

## 2022-10-26 RX ADMIN — PANTOPRAZOLE SODIUM 40 MG: 40 TABLET, DELAYED RELEASE ORAL at 05:20

## 2022-10-26 RX ADMIN — DOXYCYCLINE 100 MG: 100 CAPSULE ORAL at 20:36

## 2022-10-26 RX ADMIN — POLYETHYLENE GLYCOL 3350 17 G: 17 POWDER, FOR SOLUTION ORAL at 20:36

## 2022-10-26 RX ADMIN — SENNOSIDES AND DOCUSATE SODIUM 2 TABLET: 50; 8.6 TABLET ORAL at 20:36

## 2022-10-26 RX ADMIN — ATORVASTATIN CALCIUM 20 MG: 20 TABLET, FILM COATED ORAL at 09:11

## 2022-10-26 RX ADMIN — Medication 10 ML: at 20:36

## 2022-10-26 RX ADMIN — Medication 1 APPLICATION: at 09:13

## 2022-10-26 RX ADMIN — SENNOSIDES AND DOCUSATE SODIUM 2 TABLET: 50; 8.6 TABLET ORAL at 09:11

## 2022-10-26 RX ADMIN — CEFDINIR 300 MG: 300 CAPSULE ORAL at 09:11

## 2022-10-26 RX ADMIN — BISACODYL 10 MG: 5 TABLET, COATED ORAL at 09:11

## 2022-10-26 RX ADMIN — BISOPROLOL FUMARATE 10 MG: 5 TABLET ORAL at 09:11

## 2022-10-26 RX ADMIN — BUDESONIDE AND FORMOTEROL FUMARATE DIHYDRATE 2 PUFF: 160; 4.5 AEROSOL RESPIRATORY (INHALATION) at 20:03

## 2022-10-26 RX ADMIN — ENOXAPARIN SODIUM 40 MG: 40 INJECTION SUBCUTANEOUS at 20:36

## 2022-10-26 RX ADMIN — ENOXAPARIN SODIUM 40 MG: 40 INJECTION SUBCUTANEOUS at 09:12

## 2022-10-26 RX ADMIN — SIMETHICONE 160 MG: 80 TABLET, CHEWABLE ORAL at 14:51

## 2022-10-26 RX ADMIN — DOXYCYCLINE 100 MG: 100 CAPSULE ORAL at 09:11

## 2022-10-26 RX ADMIN — CEFDINIR 300 MG: 300 CAPSULE ORAL at 20:36

## 2022-10-26 RX ADMIN — Medication 10 ML: at 09:12

## 2022-10-26 RX ADMIN — POLYETHYLENE GLYCOL 3350 17 G: 17 POWDER, FOR SOLUTION ORAL at 09:11

## 2022-10-26 RX ADMIN — SIMETHICONE 80 MG: 80 TABLET, CHEWABLE ORAL at 21:21

## 2022-10-26 RX ADMIN — MAGNESIUM HYDROXIDE 15 ML: 2400 SUSPENSION ORAL at 18:41

## 2022-10-26 RX ADMIN — ACETAMINOPHEN 650 MG: 325 TABLET, FILM COATED ORAL at 18:38

## 2022-10-26 RX ADMIN — Medication 1 APPLICATION: at 20:40

## 2022-10-26 RX ADMIN — SODIUM CHLORIDE 20 ML: 3 INJECTION, SOLUTION INTRAVENOUS at 21:58

## 2022-10-27 LAB
ANION GAP SERPL CALCULATED.3IONS-SCNC: 10 MMOL/L (ref 5–15)
BUN SERPL-MCNC: 10 MG/DL (ref 8–23)
BUN/CREAT SERPL: 27 (ref 7–25)
CALCIUM SPEC-SCNC: 8.2 MG/DL (ref 8.6–10.5)
CHLORIDE SERPL-SCNC: 87 MMOL/L (ref 98–107)
CO2 SERPL-SCNC: 23 MMOL/L (ref 22–29)
CREAT SERPL-MCNC: 0.37 MG/DL (ref 0.57–1)
EGFRCR SERPLBLD CKD-EPI 2021: 102.1 ML/MIN/1.73
GLUCOSE SERPL-MCNC: 113 MG/DL (ref 65–99)
POTASSIUM SERPL-SCNC: 4.3 MMOL/L (ref 3.5–5.2)
SODIUM SERPL-SCNC: 117 MMOL/L (ref 136–145)
SODIUM SERPL-SCNC: 120 MMOL/L (ref 136–145)
SODIUM SERPL-SCNC: 123 MMOL/L (ref 136–145)

## 2022-10-27 PROCEDURE — 25010000002 ENOXAPARIN PER 10 MG: Performed by: INTERNAL MEDICINE

## 2022-10-27 PROCEDURE — 97535 SELF CARE MNGMENT TRAINING: CPT

## 2022-10-27 PROCEDURE — 25010000002 HYALURONIDASE (HUMAN) 150 UNIT/ML SOLUTION 1 ML VIAL: Performed by: INTERNAL MEDICINE

## 2022-10-27 PROCEDURE — 97110 THERAPEUTIC EXERCISES: CPT

## 2022-10-27 PROCEDURE — 80048 BASIC METABOLIC PNL TOTAL CA: CPT | Performed by: INTERNAL MEDICINE

## 2022-10-27 PROCEDURE — 84295 ASSAY OF SERUM SODIUM: CPT | Performed by: STUDENT IN AN ORGANIZED HEALTH CARE EDUCATION/TRAINING PROGRAM

## 2022-10-27 PROCEDURE — 25010000002 MORPHINE PER 10 MG: Performed by: INTERNAL MEDICINE

## 2022-10-27 PROCEDURE — 94799 UNLISTED PULMONARY SVC/PX: CPT

## 2022-10-27 PROCEDURE — 99233 SBSQ HOSP IP/OBS HIGH 50: CPT | Performed by: INTERNAL MEDICINE

## 2022-10-27 PROCEDURE — 84295 ASSAY OF SERUM SODIUM: CPT | Performed by: INTERNAL MEDICINE

## 2022-10-27 PROCEDURE — 63710000001 PREDNISONE PER 5 MG: Performed by: INTERNAL MEDICINE

## 2022-10-27 PROCEDURE — 63710000001 PREDNISONE PER 1 MG: Performed by: INTERNAL MEDICINE

## 2022-10-27 RX ORDER — PREDNISONE 20 MG/1
20 TABLET ORAL DAILY
Status: DISCONTINUED | OUTPATIENT
Start: 2022-10-28 | End: 2022-11-02 | Stop reason: HOSPADM

## 2022-10-27 RX ORDER — 3% SODIUM CHLORIDE 3 G/100ML
20 INJECTION, SOLUTION INTRAVENOUS CONTINUOUS
Status: DISCONTINUED | OUTPATIENT
Start: 2022-10-27 | End: 2022-10-27

## 2022-10-27 RX ORDER — POLYETHYLENE GLYCOL 3350 17 G/17G
17 POWDER, FOR SOLUTION ORAL DAILY
Status: DISCONTINUED | OUTPATIENT
Start: 2022-10-27 | End: 2022-10-30

## 2022-10-27 RX ORDER — MORPHINE SULFATE 2 MG/ML
1 INJECTION, SOLUTION INTRAMUSCULAR; INTRAVENOUS EVERY 4 HOURS PRN
Status: DISCONTINUED | OUTPATIENT
Start: 2022-10-27 | End: 2022-11-02 | Stop reason: HOSPADM

## 2022-10-27 RX ORDER — ACETAMINOPHEN 325 MG/1
650 TABLET ORAL 4 TIMES DAILY
Status: DISCONTINUED | OUTPATIENT
Start: 2022-10-27 | End: 2022-11-02 | Stop reason: HOSPADM

## 2022-10-27 RX ORDER — PREDNISONE 20 MG/1
20 TABLET ORAL ONCE
Status: COMPLETED | OUTPATIENT
Start: 2022-10-27 | End: 2022-10-27

## 2022-10-27 RX ORDER — TAMSULOSIN HYDROCHLORIDE 0.4 MG/1
0.4 CAPSULE ORAL DAILY
Status: DISCONTINUED | OUTPATIENT
Start: 2022-10-27 | End: 2022-11-02 | Stop reason: HOSPADM

## 2022-10-27 RX ORDER — PREDNISONE 10 MG/1
10 TABLET ORAL ONCE
Status: DISCONTINUED | OUTPATIENT
Start: 2022-10-28 | End: 2022-10-27

## 2022-10-27 RX ADMIN — ENOXAPARIN SODIUM 40 MG: 40 INJECTION SUBCUTANEOUS at 09:04

## 2022-10-27 RX ADMIN — Medication 1 APPLICATION: at 13:37

## 2022-10-27 RX ADMIN — DOXYCYCLINE 100 MG: 100 CAPSULE ORAL at 21:16

## 2022-10-27 RX ADMIN — CEFDINIR 300 MG: 300 CAPSULE ORAL at 09:03

## 2022-10-27 RX ADMIN — ATORVASTATIN CALCIUM 20 MG: 20 TABLET, FILM COATED ORAL at 09:04

## 2022-10-27 RX ADMIN — SODIUM CHLORIDE 20 ML/HR: 3 INJECTION, SOLUTION INTRAVENOUS at 09:22

## 2022-10-27 RX ADMIN — SIMETHICONE 80 MG: 80 TABLET, CHEWABLE ORAL at 04:59

## 2022-10-27 RX ADMIN — ACETAMINOPHEN 650 MG: 325 TABLET, FILM COATED ORAL at 10:59

## 2022-10-27 RX ADMIN — ACETAMINOPHEN 650 MG: 325 TABLET, FILM COATED ORAL at 04:59

## 2022-10-27 RX ADMIN — Medication 10 ML: at 09:04

## 2022-10-27 RX ADMIN — PREDNISONE 20 MG: 20 TABLET ORAL at 17:16

## 2022-10-27 RX ADMIN — ACETAMINOPHEN 650 MG: 325 TABLET, FILM COATED ORAL at 21:16

## 2022-10-27 RX ADMIN — MORPHINE SULFATE 1 MG: 2 INJECTION, SOLUTION INTRAMUSCULAR; INTRAVENOUS at 08:45

## 2022-10-27 RX ADMIN — LACTULOSE 20 G: 20 SOLUTION ORAL at 13:52

## 2022-10-27 RX ADMIN — SENNOSIDES AND DOCUSATE SODIUM 2 TABLET: 50; 8.6 TABLET ORAL at 09:04

## 2022-10-27 RX ADMIN — NIFEDIPINE 30 MG: 30 TABLET, FILM COATED, EXTENDED RELEASE ORAL at 09:03

## 2022-10-27 RX ADMIN — BISOPROLOL FUMARATE 10 MG: 5 TABLET ORAL at 09:03

## 2022-10-27 RX ADMIN — ENOXAPARIN SODIUM 40 MG: 40 INJECTION SUBCUTANEOUS at 21:21

## 2022-10-27 RX ADMIN — HYALURONIDASE (HUMAN RECOMBINANT) 150 UNITS: 150 INJECTION, SOLUTION SUBCUTANEOUS at 16:46

## 2022-10-27 RX ADMIN — ACETAMINOPHEN 650 MG: 325 TABLET, FILM COATED ORAL at 17:16

## 2022-10-27 RX ADMIN — PREDNISONE 10 MG: 10 TABLET ORAL at 09:04

## 2022-10-27 RX ADMIN — POLYETHYLENE GLYCOL 3350 17 G: 17 POWDER, FOR SOLUTION ORAL at 09:04

## 2022-10-27 RX ADMIN — SENNOSIDES AND DOCUSATE SODIUM 2 TABLET: 50; 8.6 TABLET ORAL at 21:16

## 2022-10-27 RX ADMIN — MORPHINE SULFATE 1 MG: 2 INJECTION, SOLUTION INTRAMUSCULAR; INTRAVENOUS at 13:37

## 2022-10-27 RX ADMIN — TAMSULOSIN HYDROCHLORIDE 0.4 MG: 0.4 CAPSULE ORAL at 13:37

## 2022-10-27 RX ADMIN — PANTOPRAZOLE SODIUM 40 MG: 40 TABLET, DELAYED RELEASE ORAL at 04:59

## 2022-10-27 RX ADMIN — BISACODYL 10 MG: 10 SUPPOSITORY RECTAL at 09:04

## 2022-10-27 RX ADMIN — BUDESONIDE AND FORMOTEROL FUMARATE DIHYDRATE 2 PUFF: 160; 4.5 AEROSOL RESPIRATORY (INHALATION) at 20:07

## 2022-10-27 RX ADMIN — CEFDINIR 300 MG: 300 CAPSULE ORAL at 21:16

## 2022-10-27 RX ADMIN — Medication 10 ML: at 21:17

## 2022-10-27 RX ADMIN — Medication 1 APPLICATION: at 21:17

## 2022-10-27 RX ADMIN — DOXYCYCLINE 100 MG: 100 CAPSULE ORAL at 09:03

## 2022-10-28 LAB
ANION GAP SERPL CALCULATED.3IONS-SCNC: 14 MMOL/L (ref 5–15)
BUN SERPL-MCNC: 8 MG/DL (ref 8–23)
BUN/CREAT SERPL: 22.2 (ref 7–25)
CALCIUM SPEC-SCNC: 8 MG/DL (ref 8.6–10.5)
CHLORIDE SERPL-SCNC: 88 MMOL/L (ref 98–107)
CO2 SERPL-SCNC: 19 MMOL/L (ref 22–29)
CREAT SERPL-MCNC: 0.36 MG/DL (ref 0.57–1)
CREAT UR-MCNC: 34 MG/DL
EGFRCR SERPLBLD CKD-EPI 2021: 102.8 ML/MIN/1.73
GLUCOSE SERPL-MCNC: 78 MG/DL (ref 65–99)
OSMOLALITY UR: 216 MOSM/KG (ref 300–1100)
POTASSIUM SERPL-SCNC: 4.5 MMOL/L (ref 3.5–5.2)
PROT ?TM UR-MCNC: 11.6 MG/DL
SODIUM SERPL-SCNC: 120 MMOL/L (ref 136–145)
SODIUM SERPL-SCNC: 121 MMOL/L (ref 136–145)
SODIUM SERPL-SCNC: 123 MMOL/L (ref 136–145)
SODIUM UR-SCNC: 27 MMOL/L

## 2022-10-28 PROCEDURE — 84300 ASSAY OF URINE SODIUM: CPT | Performed by: INTERNAL MEDICINE

## 2022-10-28 PROCEDURE — 99233 SBSQ HOSP IP/OBS HIGH 50: CPT | Performed by: INTERNAL MEDICINE

## 2022-10-28 PROCEDURE — 83935 ASSAY OF URINE OSMOLALITY: CPT | Performed by: INTERNAL MEDICINE

## 2022-10-28 PROCEDURE — 94799 UNLISTED PULMONARY SVC/PX: CPT

## 2022-10-28 PROCEDURE — 82570 ASSAY OF URINE CREATININE: CPT | Performed by: INTERNAL MEDICINE

## 2022-10-28 PROCEDURE — 97530 THERAPEUTIC ACTIVITIES: CPT

## 2022-10-28 PROCEDURE — 84295 ASSAY OF SERUM SODIUM: CPT | Performed by: INTERNAL MEDICINE

## 2022-10-28 PROCEDURE — 63710000001 PREDNISONE PER 1 MG: Performed by: INTERNAL MEDICINE

## 2022-10-28 PROCEDURE — 84156 ASSAY OF PROTEIN URINE: CPT | Performed by: INTERNAL MEDICINE

## 2022-10-28 PROCEDURE — 80048 BASIC METABOLIC PNL TOTAL CA: CPT | Performed by: INTERNAL MEDICINE

## 2022-10-28 PROCEDURE — 25010000002 ENOXAPARIN PER 10 MG: Performed by: INTERNAL MEDICINE

## 2022-10-28 PROCEDURE — 97110 THERAPEUTIC EXERCISES: CPT

## 2022-10-28 RX ADMIN — ENOXAPARIN SODIUM 40 MG: 40 INJECTION SUBCUTANEOUS at 20:37

## 2022-10-28 RX ADMIN — PANTOPRAZOLE SODIUM 40 MG: 40 TABLET, DELAYED RELEASE ORAL at 05:16

## 2022-10-28 RX ADMIN — POLYETHYLENE GLYCOL 3350 17 G: 17 POWDER, FOR SOLUTION ORAL at 10:01

## 2022-10-28 RX ADMIN — SENNOSIDES AND DOCUSATE SODIUM 2 TABLET: 50; 8.6 TABLET ORAL at 10:01

## 2022-10-28 RX ADMIN — ACETAMINOPHEN 650 MG: 325 TABLET, FILM COATED ORAL at 12:31

## 2022-10-28 RX ADMIN — Medication 10 ML: at 21:20

## 2022-10-28 RX ADMIN — BISOPROLOL FUMARATE 10 MG: 5 TABLET ORAL at 10:02

## 2022-10-28 RX ADMIN — DOXYCYCLINE 100 MG: 100 CAPSULE ORAL at 20:36

## 2022-10-28 RX ADMIN — ACETAMINOPHEN 650 MG: 325 TABLET, FILM COATED ORAL at 10:01

## 2022-10-28 RX ADMIN — ENOXAPARIN SODIUM 40 MG: 40 INJECTION SUBCUTANEOUS at 10:01

## 2022-10-28 RX ADMIN — BUDESONIDE AND FORMOTEROL FUMARATE DIHYDRATE 2 PUFF: 160; 4.5 AEROSOL RESPIRATORY (INHALATION) at 09:25

## 2022-10-28 RX ADMIN — ATORVASTATIN CALCIUM 20 MG: 20 TABLET, FILM COATED ORAL at 10:01

## 2022-10-28 RX ADMIN — CEFDINIR 300 MG: 300 CAPSULE ORAL at 10:01

## 2022-10-28 RX ADMIN — Medication 10 ML: at 10:02

## 2022-10-28 RX ADMIN — SENNOSIDES AND DOCUSATE SODIUM 2 TABLET: 50; 8.6 TABLET ORAL at 20:37

## 2022-10-28 RX ADMIN — CEFDINIR 300 MG: 300 CAPSULE ORAL at 20:37

## 2022-10-28 RX ADMIN — ACETAMINOPHEN 650 MG: 325 TABLET, FILM COATED ORAL at 20:36

## 2022-10-28 RX ADMIN — DOXYCYCLINE 100 MG: 100 CAPSULE ORAL at 10:01

## 2022-10-28 RX ADMIN — BUDESONIDE AND FORMOTEROL FUMARATE DIHYDRATE 2 PUFF: 160; 4.5 AEROSOL RESPIRATORY (INHALATION) at 20:04

## 2022-10-28 RX ADMIN — ACETAMINOPHEN 650 MG: 325 TABLET, FILM COATED ORAL at 17:06

## 2022-10-28 RX ADMIN — TAMSULOSIN HYDROCHLORIDE 0.4 MG: 0.4 CAPSULE ORAL at 10:01

## 2022-10-28 RX ADMIN — NIFEDIPINE 30 MG: 30 TABLET, FILM COATED, EXTENDED RELEASE ORAL at 10:02

## 2022-10-28 RX ADMIN — SIMETHICONE 80 MG: 80 TABLET, CHEWABLE ORAL at 17:06

## 2022-10-28 RX ADMIN — PREDNISONE 20 MG: 20 TABLET ORAL at 10:01

## 2022-10-29 LAB
ANION GAP SERPL CALCULATED.3IONS-SCNC: 13 MMOL/L (ref 5–15)
BUN SERPL-MCNC: 9 MG/DL (ref 8–23)
BUN/CREAT SERPL: 19.1 (ref 7–25)
CALCIUM SPEC-SCNC: 8.3 MG/DL (ref 8.6–10.5)
CHLORIDE SERPL-SCNC: 90 MMOL/L (ref 98–107)
CO2 SERPL-SCNC: 22 MMOL/L (ref 22–29)
CREAT SERPL-MCNC: 0.47 MG/DL (ref 0.57–1)
DEPRECATED RDW RBC AUTO: 51.7 FL (ref 37–54)
EGFRCR SERPLBLD CKD-EPI 2021: 96.4 ML/MIN/1.73
ERYTHROCYTE [DISTWIDTH] IN BLOOD BY AUTOMATED COUNT: 17.2 % (ref 12.3–15.4)
GLUCOSE SERPL-MCNC: 171 MG/DL (ref 65–99)
HCT VFR BLD AUTO: 33.6 % (ref 34–46.6)
HGB BLD-MCNC: 11.3 G/DL (ref 12–15.9)
MCH RBC QN AUTO: 27.9 PG (ref 26.6–33)
MCHC RBC AUTO-ENTMCNC: 33.6 G/DL (ref 31.5–35.7)
MCV RBC AUTO: 83 FL (ref 79–97)
PLATELET # BLD AUTO: 170 10*3/MM3 (ref 140–450)
PMV BLD AUTO: 9.7 FL (ref 6–12)
POTASSIUM SERPL-SCNC: 4 MMOL/L (ref 3.5–5.2)
RBC # BLD AUTO: 4.05 10*6/MM3 (ref 3.77–5.28)
SODIUM SERPL-SCNC: 125 MMOL/L (ref 136–145)
SODIUM SERPL-SCNC: 125 MMOL/L (ref 136–145)
SODIUM SERPL-SCNC: 129 MMOL/L (ref 136–145)
WBC NRBC COR # BLD: 14.32 10*3/MM3 (ref 3.4–10.8)

## 2022-10-29 PROCEDURE — 94664 DEMO&/EVAL PT USE INHALER: CPT

## 2022-10-29 PROCEDURE — 94799 UNLISTED PULMONARY SVC/PX: CPT

## 2022-10-29 PROCEDURE — 25010000002 MORPHINE PER 10 MG: Performed by: INTERNAL MEDICINE

## 2022-10-29 PROCEDURE — 25010000002 ENOXAPARIN PER 10 MG: Performed by: INTERNAL MEDICINE

## 2022-10-29 PROCEDURE — 80048 BASIC METABOLIC PNL TOTAL CA: CPT | Performed by: INTERNAL MEDICINE

## 2022-10-29 PROCEDURE — 63710000001 PREDNISONE PER 1 MG: Performed by: INTERNAL MEDICINE

## 2022-10-29 PROCEDURE — 99233 SBSQ HOSP IP/OBS HIGH 50: CPT | Performed by: INTERNAL MEDICINE

## 2022-10-29 PROCEDURE — 85027 COMPLETE CBC AUTOMATED: CPT | Performed by: INTERNAL MEDICINE

## 2022-10-29 PROCEDURE — 84295 ASSAY OF SERUM SODIUM: CPT | Performed by: STUDENT IN AN ORGANIZED HEALTH CARE EDUCATION/TRAINING PROGRAM

## 2022-10-29 PROCEDURE — 84295 ASSAY OF SERUM SODIUM: CPT | Performed by: INTERNAL MEDICINE

## 2022-10-29 RX ADMIN — CEFDINIR 300 MG: 300 CAPSULE ORAL at 20:56

## 2022-10-29 RX ADMIN — DOXYCYCLINE 100 MG: 100 CAPSULE ORAL at 08:40

## 2022-10-29 RX ADMIN — ENOXAPARIN SODIUM 40 MG: 40 INJECTION SUBCUTANEOUS at 08:41

## 2022-10-29 RX ADMIN — POLYETHYLENE GLYCOL 3350 17 G: 17 POWDER, FOR SOLUTION ORAL at 08:40

## 2022-10-29 RX ADMIN — MORPHINE SULFATE 1 MG: 2 INJECTION, SOLUTION INTRAMUSCULAR; INTRAVENOUS at 04:47

## 2022-10-29 RX ADMIN — TAMSULOSIN HYDROCHLORIDE 0.4 MG: 0.4 CAPSULE ORAL at 08:46

## 2022-10-29 RX ADMIN — SIMETHICONE 80 MG: 80 TABLET, CHEWABLE ORAL at 00:19

## 2022-10-29 RX ADMIN — CEFDINIR 300 MG: 300 CAPSULE ORAL at 08:39

## 2022-10-29 RX ADMIN — BUDESONIDE AND FORMOTEROL FUMARATE DIHYDRATE 2 PUFF: 160; 4.5 AEROSOL RESPIRATORY (INHALATION) at 08:30

## 2022-10-29 RX ADMIN — Medication 10 ML: at 20:56

## 2022-10-29 RX ADMIN — ATORVASTATIN CALCIUM 20 MG: 20 TABLET, FILM COATED ORAL at 08:40

## 2022-10-29 RX ADMIN — ENOXAPARIN SODIUM 40 MG: 40 INJECTION SUBCUTANEOUS at 20:56

## 2022-10-29 RX ADMIN — Medication 10 ML: at 08:42

## 2022-10-29 RX ADMIN — BUDESONIDE AND FORMOTEROL FUMARATE DIHYDRATE 2 PUFF: 160; 4.5 AEROSOL RESPIRATORY (INHALATION) at 19:47

## 2022-10-29 RX ADMIN — PANTOPRAZOLE SODIUM 40 MG: 40 TABLET, DELAYED RELEASE ORAL at 04:47

## 2022-10-29 RX ADMIN — NIFEDIPINE 30 MG: 30 TABLET, FILM COATED, EXTENDED RELEASE ORAL at 08:40

## 2022-10-29 RX ADMIN — PREDNISONE 20 MG: 20 TABLET ORAL at 08:40

## 2022-10-29 RX ADMIN — ACETAMINOPHEN 650 MG: 325 TABLET, FILM COATED ORAL at 12:04

## 2022-10-29 RX ADMIN — ACETAMINOPHEN 650 MG: 325 TABLET, FILM COATED ORAL at 20:56

## 2022-10-29 RX ADMIN — SENNOSIDES AND DOCUSATE SODIUM 2 TABLET: 50; 8.6 TABLET ORAL at 20:56

## 2022-10-29 RX ADMIN — SENNOSIDES AND DOCUSATE SODIUM 2 TABLET: 50; 8.6 TABLET ORAL at 08:47

## 2022-10-29 RX ADMIN — BISOPROLOL FUMARATE 10 MG: 5 TABLET ORAL at 08:40

## 2022-10-29 RX ADMIN — ACETAMINOPHEN 650 MG: 325 TABLET, FILM COATED ORAL at 08:40

## 2022-10-29 RX ADMIN — SIMETHICONE 80 MG: 80 TABLET, CHEWABLE ORAL at 13:21

## 2022-10-29 RX ADMIN — ACETAMINOPHEN 650 MG: 325 TABLET, FILM COATED ORAL at 17:18

## 2022-10-29 RX ADMIN — DOXYCYCLINE 100 MG: 100 CAPSULE ORAL at 20:56

## 2022-10-30 ENCOUNTER — APPOINTMENT (OUTPATIENT)
Dept: GENERAL RADIOLOGY | Facility: HOSPITAL | Age: 82
End: 2022-10-30

## 2022-10-30 PROBLEM — R33.8 ACUTE URINARY RETENTION: Status: ACTIVE | Noted: 2022-10-30

## 2022-10-30 PROBLEM — B96.20 E. COLI UTI: Status: ACTIVE | Noted: 2022-10-18

## 2022-10-30 PROBLEM — E27.3 ADRENAL INSUFFICIENCY DUE TO CORTICOSTEROID WITHDRAWAL: Status: ACTIVE | Noted: 2022-10-30

## 2022-10-30 PROBLEM — J45.901 MODERATE ASTHMA WITH EXACERBATION: Status: ACTIVE | Noted: 2022-10-30

## 2022-10-30 PROBLEM — E66.01 MORBID OBESITY WITH BMI OF 40.0-44.9, ADULT: Status: ACTIVE | Noted: 2022-10-30

## 2022-10-30 PROBLEM — Z79.52 CURRENT CHRONIC USE OF SYSTEMIC STEROIDS: Status: ACTIVE | Noted: 2022-10-30

## 2022-10-30 PROBLEM — T38.0X5A ADRENAL INSUFFICIENCY DUE TO CORTICOSTEROID WITHDRAWAL: Status: ACTIVE | Noted: 2022-10-30

## 2022-10-30 LAB — SODIUM SERPL-SCNC: 128 MMOL/L (ref 136–145)

## 2022-10-30 PROCEDURE — 84295 ASSAY OF SERUM SODIUM: CPT | Performed by: INTERNAL MEDICINE

## 2022-10-30 PROCEDURE — 25010000002 ENOXAPARIN PER 10 MG: Performed by: INTERNAL MEDICINE

## 2022-10-30 PROCEDURE — 25010000002 MORPHINE PER 10 MG: Performed by: INTERNAL MEDICINE

## 2022-10-30 PROCEDURE — 94799 UNLISTED PULMONARY SVC/PX: CPT

## 2022-10-30 PROCEDURE — 74018 RADEX ABDOMEN 1 VIEW: CPT

## 2022-10-30 PROCEDURE — 99233 SBSQ HOSP IP/OBS HIGH 50: CPT | Performed by: INTERNAL MEDICINE

## 2022-10-30 PROCEDURE — 63710000001 PREDNISONE PER 1 MG: Performed by: INTERNAL MEDICINE

## 2022-10-30 RX ORDER — BISACODYL 10 MG
10 SUPPOSITORY, RECTAL RECTAL DAILY
Status: DISCONTINUED | OUTPATIENT
Start: 2022-10-30 | End: 2022-11-01 | Stop reason: ALTCHOICE

## 2022-10-30 RX ADMIN — ACETAMINOPHEN 650 MG: 325 TABLET, FILM COATED ORAL at 08:55

## 2022-10-30 RX ADMIN — ENOXAPARIN SODIUM 40 MG: 40 INJECTION SUBCUTANEOUS at 08:55

## 2022-10-30 RX ADMIN — BUDESONIDE AND FORMOTEROL FUMARATE DIHYDRATE 2 PUFF: 160; 4.5 AEROSOL RESPIRATORY (INHALATION) at 20:02

## 2022-10-30 RX ADMIN — PANTOPRAZOLE SODIUM 40 MG: 40 TABLET, DELAYED RELEASE ORAL at 06:07

## 2022-10-30 RX ADMIN — PREDNISONE 20 MG: 20 TABLET ORAL at 08:54

## 2022-10-30 RX ADMIN — Medication 10 ML: at 21:31

## 2022-10-30 RX ADMIN — APIXABAN 5 MG: 5 TABLET, FILM COATED ORAL at 21:31

## 2022-10-30 RX ADMIN — APIXABAN 5 MG: 5 TABLET, FILM COATED ORAL at 11:56

## 2022-10-30 RX ADMIN — NIFEDIPINE 30 MG: 30 TABLET, FILM COATED, EXTENDED RELEASE ORAL at 08:54

## 2022-10-30 RX ADMIN — POLYETHYLENE GLYCOL 3350 17 G: 17 POWDER, FOR SOLUTION ORAL at 08:55

## 2022-10-30 RX ADMIN — CEFDINIR 300 MG: 300 CAPSULE ORAL at 08:54

## 2022-10-30 RX ADMIN — SENNOSIDES AND DOCUSATE SODIUM 2 TABLET: 50; 8.6 TABLET ORAL at 08:54

## 2022-10-30 RX ADMIN — TAMSULOSIN HYDROCHLORIDE 0.4 MG: 0.4 CAPSULE ORAL at 08:54

## 2022-10-30 RX ADMIN — BISOPROLOL FUMARATE 10 MG: 5 TABLET ORAL at 08:55

## 2022-10-30 RX ADMIN — Medication 10 ML: at 08:55

## 2022-10-30 RX ADMIN — BISACODYL 10 MG: 10 SUPPOSITORY RECTAL at 11:56

## 2022-10-30 RX ADMIN — ACETAMINOPHEN 650 MG: 325 TABLET, FILM COATED ORAL at 21:31

## 2022-10-30 RX ADMIN — MORPHINE SULFATE 1 MG: 2 INJECTION, SOLUTION INTRAMUSCULAR; INTRAVENOUS at 13:59

## 2022-10-30 RX ADMIN — ACETAMINOPHEN 650 MG: 325 TABLET, FILM COATED ORAL at 11:56

## 2022-10-30 RX ADMIN — DOXYCYCLINE 100 MG: 100 CAPSULE ORAL at 08:54

## 2022-10-30 RX ADMIN — ATORVASTATIN CALCIUM 20 MG: 20 TABLET, FILM COATED ORAL at 08:55

## 2022-10-30 RX ADMIN — ACETAMINOPHEN 650 MG: 325 TABLET, FILM COATED ORAL at 18:10

## 2022-10-30 RX ADMIN — BUDESONIDE AND FORMOTEROL FUMARATE DIHYDRATE 2 PUFF: 160; 4.5 AEROSOL RESPIRATORY (INHALATION) at 07:28

## 2022-10-30 RX ADMIN — SIMETHICONE 80 MG: 80 TABLET, CHEWABLE ORAL at 08:55

## 2022-10-31 LAB
ALBUMIN SERPL-MCNC: 2.6 G/DL (ref 3.5–5.2)
ALBUMIN/GLOB SERPL: 1.1 G/DL
ALP SERPL-CCNC: 71 U/L (ref 39–117)
ALT SERPL W P-5'-P-CCNC: 31 U/L (ref 1–33)
ANION GAP SERPL CALCULATED.3IONS-SCNC: 10 MMOL/L (ref 5–15)
AST SERPL-CCNC: 16 U/L (ref 1–32)
BILIRUB SERPL-MCNC: 0.5 MG/DL (ref 0–1.2)
BUN SERPL-MCNC: 10 MG/DL (ref 8–23)
BUN/CREAT SERPL: 31.3 (ref 7–25)
CALCIUM SPEC-SCNC: 8.2 MG/DL (ref 8.6–10.5)
CHLORIDE SERPL-SCNC: 93 MMOL/L (ref 98–107)
CO2 SERPL-SCNC: 23 MMOL/L (ref 22–29)
CREAT SERPL-MCNC: 0.32 MG/DL (ref 0.57–1)
DEPRECATED RDW RBC AUTO: 53.3 FL (ref 37–54)
EGFRCR SERPLBLD CKD-EPI 2021: 105.7 ML/MIN/1.73
ERYTHROCYTE [DISTWIDTH] IN BLOOD BY AUTOMATED COUNT: 17.4 % (ref 12.3–15.4)
GLOBULIN UR ELPH-MCNC: 2.4 GM/DL
GLUCOSE SERPL-MCNC: 72 MG/DL (ref 65–99)
HCT VFR BLD AUTO: 33.7 % (ref 34–46.6)
HGB BLD-MCNC: 10.9 G/DL (ref 12–15.9)
MCH RBC QN AUTO: 27.3 PG (ref 26.6–33)
MCHC RBC AUTO-ENTMCNC: 32.3 G/DL (ref 31.5–35.7)
MCV RBC AUTO: 84.3 FL (ref 79–97)
PLATELET # BLD AUTO: 162 10*3/MM3 (ref 140–450)
PMV BLD AUTO: 9.5 FL (ref 6–12)
POTASSIUM SERPL-SCNC: 3.4 MMOL/L (ref 3.5–5.2)
PROT SERPL-MCNC: 5 G/DL (ref 6–8.5)
RBC # BLD AUTO: 4 10*6/MM3 (ref 3.77–5.28)
SODIUM SERPL-SCNC: 126 MMOL/L (ref 136–145)
WBC NRBC COR # BLD: 9.1 10*3/MM3 (ref 3.4–10.8)

## 2022-10-31 PROCEDURE — 94799 UNLISTED PULMONARY SVC/PX: CPT

## 2022-10-31 PROCEDURE — 80053 COMPREHEN METABOLIC PANEL: CPT | Performed by: INTERNAL MEDICINE

## 2022-10-31 PROCEDURE — 99233 SBSQ HOSP IP/OBS HIGH 50: CPT | Performed by: INTERNAL MEDICINE

## 2022-10-31 PROCEDURE — 97530 THERAPEUTIC ACTIVITIES: CPT

## 2022-10-31 PROCEDURE — 97110 THERAPEUTIC EXERCISES: CPT

## 2022-10-31 PROCEDURE — 63710000001 PREDNISONE PER 1 MG: Performed by: INTERNAL MEDICINE

## 2022-10-31 PROCEDURE — 85027 COMPLETE CBC AUTOMATED: CPT | Performed by: INTERNAL MEDICINE

## 2022-10-31 PROCEDURE — 94664 DEMO&/EVAL PT USE INHALER: CPT

## 2022-10-31 RX ORDER — SIMETHICONE 80 MG
160 TABLET,CHEWABLE ORAL 3 TIMES DAILY PRN
Status: DISCONTINUED | OUTPATIENT
Start: 2022-10-31 | End: 2022-11-02 | Stop reason: HOSPADM

## 2022-10-31 RX ADMIN — SIMETHICONE 80 MG: 80 TABLET, CHEWABLE ORAL at 09:39

## 2022-10-31 RX ADMIN — NIFEDIPINE 30 MG: 30 TABLET, FILM COATED, EXTENDED RELEASE ORAL at 08:29

## 2022-10-31 RX ADMIN — BISOPROLOL FUMARATE 10 MG: 5 TABLET ORAL at 08:28

## 2022-10-31 RX ADMIN — APIXABAN 5 MG: 5 TABLET, FILM COATED ORAL at 08:29

## 2022-10-31 RX ADMIN — Medication 10 ML: at 21:06

## 2022-10-31 RX ADMIN — PREDNISONE 20 MG: 20 TABLET ORAL at 08:28

## 2022-10-31 RX ADMIN — TAMSULOSIN HYDROCHLORIDE 0.4 MG: 0.4 CAPSULE ORAL at 08:29

## 2022-10-31 RX ADMIN — ACETAMINOPHEN 650 MG: 325 TABLET, FILM COATED ORAL at 08:28

## 2022-10-31 RX ADMIN — APIXABAN 5 MG: 5 TABLET, FILM COATED ORAL at 21:07

## 2022-10-31 RX ADMIN — BUDESONIDE AND FORMOTEROL FUMARATE DIHYDRATE 2 PUFF: 160; 4.5 AEROSOL RESPIRATORY (INHALATION) at 20:39

## 2022-10-31 RX ADMIN — ACETAMINOPHEN 650 MG: 325 TABLET, FILM COATED ORAL at 21:07

## 2022-10-31 RX ADMIN — ACETAMINOPHEN 650 MG: 325 TABLET, FILM COATED ORAL at 17:14

## 2022-10-31 RX ADMIN — ATORVASTATIN CALCIUM 20 MG: 20 TABLET, FILM COATED ORAL at 08:29

## 2022-10-31 RX ADMIN — PANTOPRAZOLE SODIUM 40 MG: 40 TABLET, DELAYED RELEASE ORAL at 05:46

## 2022-10-31 RX ADMIN — BUDESONIDE AND FORMOTEROL FUMARATE DIHYDRATE 2 PUFF: 160; 4.5 AEROSOL RESPIRATORY (INHALATION) at 10:40

## 2022-10-31 RX ADMIN — Medication 10 ML: at 08:29

## 2022-10-31 RX ADMIN — ACETAMINOPHEN 650 MG: 325 TABLET, FILM COATED ORAL at 12:36

## 2022-11-01 LAB
ALDOST SERPL-MCNC: 3.3 NG/DL (ref 0–30)
ANION GAP SERPL CALCULATED.3IONS-SCNC: 9 MMOL/L (ref 5–15)
BUN SERPL-MCNC: 8 MG/DL (ref 8–23)
BUN/CREAT SERPL: 22.9 (ref 7–25)
CALCIUM SPEC-SCNC: 8.6 MG/DL (ref 8.6–10.5)
CHLORIDE SERPL-SCNC: 99 MMOL/L (ref 98–107)
CO2 SERPL-SCNC: 25 MMOL/L (ref 22–29)
CREAT SERPL-MCNC: 0.35 MG/DL (ref 0.57–1)
EGFRCR SERPLBLD CKD-EPI 2021: 103.5 ML/MIN/1.73
GLUCOSE SERPL-MCNC: 72 MG/DL (ref 65–99)
POTASSIUM SERPL-SCNC: 3.8 MMOL/L (ref 3.5–5.2)
SARS-COV-2 RDRP RESP QL NAA+PROBE: NORMAL
SODIUM SERPL-SCNC: 133 MMOL/L (ref 136–145)

## 2022-11-01 PROCEDURE — 25010000002 MAGNESIUM SULFATE 2 GM/50ML SOLUTION: Performed by: INTERNAL MEDICINE

## 2022-11-01 PROCEDURE — 94799 UNLISTED PULMONARY SVC/PX: CPT

## 2022-11-01 PROCEDURE — 94664 DEMO&/EVAL PT USE INHALER: CPT

## 2022-11-01 PROCEDURE — 80048 BASIC METABOLIC PNL TOTAL CA: CPT | Performed by: INTERNAL MEDICINE

## 2022-11-01 PROCEDURE — 97530 THERAPEUTIC ACTIVITIES: CPT

## 2022-11-01 PROCEDURE — 97110 THERAPEUTIC EXERCISES: CPT

## 2022-11-01 PROCEDURE — 87635 SARS-COV-2 COVID-19 AMP PRB: CPT | Performed by: INTERNAL MEDICINE

## 2022-11-01 PROCEDURE — 63710000001 PREDNISONE PER 1 MG: Performed by: INTERNAL MEDICINE

## 2022-11-01 PROCEDURE — 97535 SELF CARE MNGMENT TRAINING: CPT

## 2022-11-01 PROCEDURE — 99232 SBSQ HOSP IP/OBS MODERATE 35: CPT | Performed by: INTERNAL MEDICINE

## 2022-11-01 RX ORDER — BISACODYL 5 MG/1
10 TABLET, DELAYED RELEASE ORAL DAILY
Status: DISCONTINUED | OUTPATIENT
Start: 2022-11-01 | End: 2022-11-02 | Stop reason: HOSPADM

## 2022-11-01 RX ADMIN — BISACODYL 10 MG: 5 TABLET, COATED ORAL at 11:32

## 2022-11-01 RX ADMIN — PANTOPRAZOLE SODIUM 40 MG: 40 TABLET, DELAYED RELEASE ORAL at 04:53

## 2022-11-01 RX ADMIN — APIXABAN 5 MG: 5 TABLET, FILM COATED ORAL at 20:36

## 2022-11-01 RX ADMIN — NIFEDIPINE 30 MG: 30 TABLET, FILM COATED, EXTENDED RELEASE ORAL at 09:08

## 2022-11-01 RX ADMIN — ATORVASTATIN CALCIUM 20 MG: 20 TABLET, FILM COATED ORAL at 09:08

## 2022-11-01 RX ADMIN — ACETAMINOPHEN 650 MG: 325 TABLET, FILM COATED ORAL at 17:10

## 2022-11-01 RX ADMIN — APIXABAN 5 MG: 5 TABLET, FILM COATED ORAL at 09:08

## 2022-11-01 RX ADMIN — SIMETHICONE 160 MG: 80 TABLET, CHEWABLE ORAL at 09:14

## 2022-11-01 RX ADMIN — BISOPROLOL FUMARATE 10 MG: 5 TABLET ORAL at 09:08

## 2022-11-01 RX ADMIN — MAGNESIUM SULFATE HEPTAHYDRATE 2 G: 40 INJECTION, SOLUTION INTRAVENOUS at 19:02

## 2022-11-01 RX ADMIN — Medication 10 ML: at 20:36

## 2022-11-01 RX ADMIN — ACETAMINOPHEN 650 MG: 325 TABLET, FILM COATED ORAL at 11:32

## 2022-11-01 RX ADMIN — TAMSULOSIN HYDROCHLORIDE 0.4 MG: 0.4 CAPSULE ORAL at 09:08

## 2022-11-01 RX ADMIN — SIMETHICONE 160 MG: 80 TABLET, CHEWABLE ORAL at 22:54

## 2022-11-01 RX ADMIN — BUDESONIDE AND FORMOTEROL FUMARATE DIHYDRATE 2 PUFF: 160; 4.5 AEROSOL RESPIRATORY (INHALATION) at 23:02

## 2022-11-01 RX ADMIN — BUDESONIDE AND FORMOTEROL FUMARATE DIHYDRATE 2 PUFF: 160; 4.5 AEROSOL RESPIRATORY (INHALATION) at 10:07

## 2022-11-01 RX ADMIN — ACETAMINOPHEN 650 MG: 325 TABLET, FILM COATED ORAL at 20:36

## 2022-11-01 RX ADMIN — PREDNISONE 20 MG: 20 TABLET ORAL at 09:08

## 2022-11-01 RX ADMIN — Medication 10 ML: at 09:08

## 2022-11-01 RX ADMIN — ACETAMINOPHEN 650 MG: 325 TABLET, FILM COATED ORAL at 09:08

## 2022-11-01 NOTE — PLAN OF CARE
Goal Outcome Evaluation:               Aox4. Non-tele. Room air. No complaints of pain. Ayala in place.

## 2022-11-01 NOTE — THERAPY PROGRESS REPORT/RE-CERT
Patient Name: Sol Lovelace  : 1940    MRN: [id number]                              Today's Date: 2022       Admit Date: 10/18/2022    Visit Dx:     ICD-10-CM ICD-9-CM   1. Hyponatremia  E87.1 276.1   2. Sepsis, due to unspecified organism, unspecified whether acute organ dysfunction present ([facility])  A41.9 038.9     995.91   3. Acute encephalopathy  G93.40 348.30   4. SIADH (syndrome of inappropriate ADH production) ([facility])  E22.2 253.6     Patient Active Problem List   Diagnosis   • E. coli UTI   • Hyponatremia   • Elevated liver enzymes   • Hypoalbuminemia   • Leukocytosis   • Proteinuria   • Sepsis ([facility])   • Paroxysmal atrial fibrillation ([facility])   • Essential hypertension   • Acute urinary retention   • Current chronic use of systemic steroids   • Adrenal insufficiency due to corticosteroid withdrawal ([facility])   • Morbid obesity with BMI of 40.0-44.9, adult ([facility])   • Moderate asthma with exacerbation     Past Medical History:   Diagnosis Date   • Asthma    • Chronic back pain    • GERD (gastroesophageal reflux disease)    • Hyperlipidemia    • Hypertension    • Osteoarthritis    • Rheumatoid arthritis ([facility])      Past Surgical History:   Procedure Laterality Date   • LAPAROSCOPIC VAGINAL HYSTERECTOMY      BSO, bladder suspension      General Information     Row Name 22 1029          OT Time and Intention    Document Type progress note/recertification  -     Mode of Treatment occupational therapy  -     Row Name 22 1029          General Information    Patient Profile Reviewed yes  -     Existing Precautions/Restrictions fall;oxygen therapy device and L/min;other (see comments)  gerber  -     Barriers to Rehab medically complex;previous functional deficit  -     Row Name 22 1029          Cognition    Orientation Status (Cognition) oriented x 3  -     Row Name 22 1029          Safety Issues, Functional Mobility    Safety Issues Affecting Function (Mobility) safety precautions  follow-through/compliance;safety precaution awareness;insight into deficits/self-awareness;ability to follow commands;awareness of need for assistance;judgment;problem-solving;sequencing abilities  -     Impairments Affecting Function (Mobility) balance;coordination;endurance/activity tolerance;motor planning;pain;postural/trunk control;shortness of breath;strength  -           User Key  (r) = Recorded By, (t) = Taken By, (c) = Cosigned By    Initials Name Provider Type     Janeth Oneil OT Occupational Therapist                 Mobility/ADL's     Row Name 11/01/22 1030          Activities of Daily Living    BADL Assessment/Intervention grooming  -     Row Name 11/01/22 1030          Grooming Assessment/Training    [place] Level (Grooming) hair care, combing/brushing;minimum assist (75% patient effort)  -     Position (Grooming) sitting up in bed  -     Comment, (Grooming) Pt had already completed oral care with setup from RN on OT entry. Required Miladys to reach back of hair and washed face with setup. Extra time/effort required.  -           User Key  (r) = Recorded By, (t) = Taken By, (c) = Cosigned By    Initials Name Provider Type     Janeth Oneil OT Occupational Therapist               Obj/Interventions     Row Name 11/01/22 1031          Shoulder (Therapeutic Exercise)    Shoulder (Therapeutic Exercise) AROM (active range of motion)  -     Shoulder AROM (Therapeutic Exercise) bilateral;flexion;extension;aBduction;aDduction;supine;10 repetitions  -     Row Name 11/01/22 1031          Elbow/Forearm (Therapeutic Exercise)    Elbow/Forearm (Therapeutic Exercise) AROM (active range of motion)  -     Elbow/Forearm AROM (Therapeutic Exercise) bilateral;flexion;extension;supination;pronation;supine;10 repetitions  -     Row Name 11/01/22 1031          Wrist (Therapeutic Exercise)    Wrist AROM (Therapeutic Exercise) bilateral;flexion;extension;10 repetitions  -     Row Name  11/01/22 1031          Hand (Therapeutic Exercise)    Hand (Therapeutic Exercise) AROM (active range of motion)  -     Hand AROM/AAROM (Therapeutic Exercise) bilateral;AROM (active range of motion);finger flexion;finger extension;10 repetitions  -     Row Name 11/01/22 1031          Motor Skills    Therapeutic Exercise shoulder;elbow/forearm;wrist;hand  -           User Key  (r) = Recorded By, (t) = Taken By, (c) = Cosigned By    Initials Name Provider Type     Janeth Oneil OT Occupational Therapist               Goals/Plan     Row Name 11/01/22 1034          Bed Mobility Goal 1 (OT)    Activity/Assistive Device (Bed Mobility Goal 1, OT) rolling to left;rolling to right;sit to supine;supine to sit  -     [place] Level/Cues Needed (Bed Mobility Goal 1, OT) moderate assist (50-74% patient effort);verbal cues required  -     Time Frame (Bed Mobility Goal 1, OT) long term goal (LTG);10 days  -HM     Progress/Outcomes (Bed Mobility Goal 1, OT) progress slower than expected;goal ongoing  -     Row Name 11/01/22 1034          Transfer Goal 1 (OT)    Activity/Assistive Device (Transfer Goal 1, OT) sit-to-stand/stand-to-sit;bed-to-chair/chair-to-bed;commode, bedside without drop arms;walker, rolling  -     [place] Level/Cues Needed (Transfer Goal 1, OT) moderate assist (50-74% patient effort);verbal cues required  -     Time Frame (Transfer Goal 1, OT) long term goal (LTG);10 days  -HM     Progress/Outcome (Transfer Goal 1, OT) progress slower than expected;goal ongoing  -     Row Name 11/01/22 1034          Dressing Goal 1 (OT)    Activity/Device (Dressing Goal 1, OT) lower body dressing  -     [place]/Cues Needed (Dressing Goal 1, OT) moderate assist (50-74% patient effort);verbal cues required  -     Time Frame (Dressing Goal 1, OT) long term goal (LTG);10 days  -HM     Progress/Outcome (Dressing Goal 1, OT) goal no longer appropriate  -     Row Name 11/01/22 1034           Grooming Goal 1 (OT)    Activity/Device (Grooming Goal 1, OT) hair care;oral care;wash face, hands  -     [place] (Grooming Goal 1, OT) supervision required  -     Time Frame (Grooming Goal 1, OT) long term goal (LTG);10 days  -     Progress/Outcome (Grooming Goal 1, OT) goal revised this date  -           User Key  (r) = Recorded By, (t) = Taken By, (c) = Cosigned By    Initials Name Provider Type     Janeth Oneil, OT Occupational Therapist               Clinical Impression     Row Name 11/01/22 1033          Pain Assessment    Pretreatment Pain Rating 4/10  -     Posttreatment Pain Rating 4/10  -     Pain Location generalized  -     Pain Location - abdomen  -     Pain Intervention(s) Ambulation/increased activity;Repositioned  -     Row Name 11/01/22 1033          Plan of Care Review    Plan of Care Reviewed With patient  -     Progress no change  -     Outcome Evaluation OT progress note completed this session. Pt completed all oral care with setup. Required Miladys to reach back of hair for completion of hair care. Washed face with setup. Completed AROM x10 reps shoulder, elbow, wrist, hand. Pt would benefit from lift room. Recommend d/c to SNF.  -     Row Name 11/01/22 1033          Therapy Assessment/Plan (OT)    Patient/Family Therapy Goal Statement (OT) Pt would like to improve and return home.  -     Rehab Potential (OT) good, to achieve stated therapy goals  -     Criteria for Skilled Therapeutic Interventions Met (OT) yes;skilled treatment is necessary  -     Therapy Frequency (OT) daily  -     Row Name 11/01/22 1033          Therapy Plan Review/Discharge Plan (OT)    Anticipated Discharge Disposition (OT) skilled nursing facility  -     Row Name 11/01/22 1033          Vital Signs    Pre Systolic BP Rehab --  RN cleared for tx; VSS  -     O2 Delivery Pre Treatment room air  -HM     O2 Delivery Intra Treatment room air  -HM     O2 Delivery Post Treatment room  air  -HM     Pre Patient Position Supine  -HM     Intra Patient Position Supine  -HM     Post Patient Position Supine  -HM     Row Name 11/01/22 1033          Positioning and Restraints    Pre-Treatment Position in bed  -HM     Post Treatment Position bed  -HM     In Bed notified nsg;supine;call light within reach;encouraged to call for assist;exit alarm on  -HM           User Key  (r) = Recorded By, (t) = Taken By, (c) = Cosigned By    Initials Name Provider Type     Janeth Oneil, OT Occupational Therapist               Outcome Measures     Row Name 11/01/22 1035          How much help from another is currently needed...    Putting on and taking off regular lower body clothing? 1  -HM     Bathing (including washing, rinsing, and drying) 2  -HM     Toileting (which includes using toilet bed pan or urinal) 1  -HM     Putting on and taking off regular upper body clothing 2  -HM     Taking care of personal grooming (such as brushing teeth) 3  -HM     Eating meals 3  -     AM-PAC 6 Clicks Score (OT) 12  -     Row Name 11/01/22 1026          How much help from another person do you currently need...    Turning from your back to your side while in flat bed without using bedrails? 2  -AS     Moving from lying on back to sitting on the side of a flat bed without bedrails? 2  -AS     Moving to and from a bed to a chair (including a wheelchair)? 2  -AS     Standing up from a chair using your arms (e.g., wheelchair, bedside chair)? 2  -AS     Climbing 3-5 steps with a railing? 1  -AS     To walk in hospital room? 1  -AS     AM-PAC 6 Clicks Score (PT) 10  -AS     Highest level of mobility 4 --> Transferred to chair/commode  -AS     Row Name 11/01/22 1035 11/01/22 1026       Functional Assessment    Outcome Measure Options AM-PAC 6 Clicks Daily Activity (OT)  - AM-PAC 6 Clicks Basic Mobility (PT)  -AS          User Key  (r) = Recorded By, (t) = Taken By, (c) = Cosigned By    Initials Name Provider Type    AS Magno  Marlen Love, SUMAYA Physical Therapist Assistant     Janeth Oneil OT Occupational Therapist                Occupational Therapy Education     Title: PT OT SLP Therapies (In Progress)     Topic: Occupational Therapy (Done)     Point: ADL training (Done)     Description:   Instruct learner(s) on proper safety adaptation and remediation techniques during self care or transfers.   Instruct in proper use of assistive devices.              Learning Progress Summary           Patient Acceptance, E,TB,D, VU,NR by  at 11/1/2022 1036    Acceptance, E, VU by  at 10/27/2022 1517    Acceptance, E,D, NR by  at 10/25/2022 0922    Acceptance, E,TB, VU,NR by [facility] at 10/20/2022 1209    Acceptance, E, NR by  at 10/20/2022 0922                   Point: Home exercise program (Done)     Description:   Instruct learner(s) on appropriate technique for monitoring, assisting and/or progressing therapeutic exercises/activities.              Learning Progress Summary           Patient Acceptance, E,TB,D, VU,NR by  at 11/1/2022 1036    Acceptance, E, VU by  at 10/27/2022 1517    Acceptance, E,D, NR by  at 10/25/2022 0922                   Point: Precautions (Done)     Description:   Instruct learner(s) on prescribed precautions during self-care and functional transfers.              Learning Progress Summary           Patient Acceptance, E,TB,D, VU,NR by  at 11/1/2022 1036    Acceptance, E,D, NR by  at 10/25/2022 0922    Acceptance, E,TB, VU,NR by [facility] at 10/20/2022 1209    Acceptance, E, NR by  at 10/20/2022 0922                   Point: Body mechanics (Done)     Description:   Instruct learner(s) on proper positioning and spine alignment during self-care, functional mobility activities and/or exercises.              Learning Progress Summary           Patient Acceptance, E,TB,D, VU,NR by  at 11/1/2022 1036    Acceptance, E,D, NR by  at 10/25/2022 0922    Acceptance, E,TB, VU,NR by [facility] at 10/20/2022 1209     Acceptance, E, NR by  at 10/20/2022 0922                               User Key     Initials Effective Dates Name Provider Type Discipline     10/25/22 -  Janeth Oneil OT Occupational Therapist OT     06/16/21 -  Lily De La Cruz, OT Occupational Therapist OT     06/16/21 -  Nataly Brock OT Occupational Therapist OT     10/14/22 -  Natalie Mack, OT Occupational Therapist OT    [facility] 09/22/22 -  Veronica Harrison, ELVIS Physical Therapist PT              OT Recommendation and Plan  Therapy Frequency (OT): daily  Plan of Care Review  Plan of Care Reviewed With: patient  Progress: no change  Outcome Evaluation: OT progress note completed this session. Pt completed all oral care with setup. Required Miladys to reach back of hair for completion of hair care. Washed face with setup. Completed AROM x10 reps shoulder, elbow, wrist, hand. Pt would benefit from lift room. Recommend d/c to SNF.     Time Calculation:    Time Calculation- OT     Row Name 11/01/22 1014             Time Calculation- OT    OT Received On 11/01/22  -      OT Goal Re-Cert Due Date 11/11/22  -         Timed Charges    74659 - OT Self Care/Mgmt Minutes 13  -HM         Total Minutes    Timed Charges Total Minutes 13  -HM       Total Minutes 13  -HM            User Key  (r) = Recorded By, (t) = Taken By, (c) = Cosigned By    Initials Name Provider Type     Janeth Oneil OT Occupational Therapist              Therapy Charges for Today     Code Description Service Date Service Provider Modifiers Qty    [id number] HC OT SELF CARE/MGMT/TRAIN EA 15 MIN 11/1/2022 Janeth Oneil OT GO 1               Janeth Oneil OT  11/1/2022

## 2022-11-01 NOTE — CASE MANAGEMENT/SOCIAL WORK
Continued Stay Note  [facility]     Patient Name: Sol Lovelace  MRN: [id number]  Today's Date: 11/1/2022    Admit Date: 10/18/2022    Plan: update   Discharge Plan     Row Name 11/01/22 1227       Plan    Plan update    Patient/Family in Agreement with Plan yes    Plan Comments Patient Na 133 today.  Spoke with liaison for [facility] who will check with the campus and confirm bed availability.  CM advised an ambulance has tenatively been set for 1330 tomorrow.  Spoke with patient at bedside regarding discharge plan and advised that it was likely she would be going to facility tomorrow and that an ambualnce has been scheduled for 1330.  Patient verbalizes understanding and agreement with plan.  She was on the phone with her daughter while CM was in the room and she updated her daughter on plan.  No new discharge needs verbalized.  CM following.  Patient plan is to discharge to [facility] tomorrow via  Ambulance scheduled for 1330.l    Final Discharge Disposition Code 03 - skilled nursing facility (SNF)               Discharge Codes    No documentation.               Expected Discharge Date and Time     Expected Discharge Date Expected Discharge Time    Nov 1, 2022             Natalie Cormier, RN

## 2022-11-01 NOTE — THERAPY TREATMENT NOTE
Patient Name: Sol Lovelace  : 1940    MRN: [id number]                              Today's Date: 2022       Admit Date: 10/18/2022    Visit Dx:     ICD-10-CM ICD-9-CM   1. Hyponatremia  E87.1 276.1   2. Sepsis, due to unspecified organism, unspecified whether acute organ dysfunction present ([facility])  A41.9 038.9     995.91   3. Acute encephalopathy  G93.40 348.30   4. SIADH (syndrome of inappropriate ADH production) ([facility])  E22.2 253.6     Patient Active Problem List   Diagnosis   • E. coli UTI   • Hyponatremia   • Elevated liver enzymes   • Hypoalbuminemia   • Leukocytosis   • Proteinuria   • Sepsis ([facility])   • Paroxysmal atrial fibrillation ([facility])   • Essential hypertension   • Acute urinary retention   • Current chronic use of systemic steroids   • Adrenal insufficiency due to corticosteroid withdrawal ([facility])   • Morbid obesity with BMI of 40.0-44.9, adult ([facility])   • Moderate asthma with exacerbation     Past Medical History:   Diagnosis Date   • Asthma    • Chronic back pain    • GERD (gastroesophageal reflux disease)    • Hyperlipidemia    • Hypertension    • Osteoarthritis    • Rheumatoid arthritis ([facility])      Past Surgical History:   Procedure Laterality Date   • LAPAROSCOPIC VAGINAL HYSTERECTOMY      BSO, bladder suspension      General Information     Row Name 22 1018          Physical Therapy Time and Intention    Document Type therapy note (daily note)  -AS     Mode of Treatment physical therapy  -AS     Row Name 22 1018          General Information    Patient Profile Reviewed yes  -AS     Existing Precautions/Restrictions fall;oxygen therapy device and L/min;other (see comments)  gerber  -AS     Barriers to Rehab medically complex;previous functional deficit;physical barrier  -AS     Row Name 22 1018          Cognition    Orientation Status (Cognition) oriented x 3  -AS     Row Name 22 1018          Safety Issues, Functional Mobility    Safety Issues Affecting Function  (Mobility) awareness of need for assistance;ability to follow commands;insight into deficits/self-awareness;judgment;positioning of assistive device;safety precaution awareness;safety precautions follow-through/compliance;sequencing abilities  -AS     Impairments Affecting Function (Mobility) balance;coordination;endurance/activity tolerance;motor planning;pain;postural/trunk control;shortness of breath;strength  -AS     Comment, Safety Issues/Impairments (Mobility) alert, diffiuclty following commands for sit<>stand transfer limiting progress with mobility, fear of falling  -AS           User Key  (r) = Recorded By, (t) = Taken By, (c) = Cosigned By    Initials Name Provider Type    AS Marlen Kiran PTA Physical Therapist Assistant               Mobility     Row Name 11/01/22 1019          Bed Mobility    Rolling Left [place] (Bed Mobility) verbal cues;maximum assist (25% patient effort);2 person assist  -AS     Rolling Right [place] (Bed Mobility) verbal cues;maximum assist (25% patient effort);1 person to manage equipment  -AS     Supine-Sit [place] (Bed Mobility) verbal cues;maximum assist (25% patient effort);2 person assist  -AS     Sit-Supine [place] (Bed Mobility) dependent (less than 25% patient effort);2 person assist  -AS     Assistive Device (Bed Mobility) bed rails;draw sheet;head of bed elevated  -AS     Comment, (Bed Mobility) patient needs increased verbal cues and time to complete tasks, rolled several times side to side to replace linen/drawsheet. Cues for sequencing to complete supine<>sit transfer, limited participation from patient with all tasks.  -AS     Row Name 11/01/22 1019          Transfers    Comment, (Transfers) attempted 3 sit<>stands from EOB, Patient having difficulty following commands for proper hand/feet placement and anterior weight shifting, unable to clear bottom off bed despite max education and cues.  -AS     Row Name 11/01/22 1019           Bed-Chair Transfer    Bed-Chair [place] (Transfers) not tested;unable to assess  patient not in lift room, nursing notified and aware  -AS     [facility] Name 11/01/22 1019          Sit-Stand Transfer    Sit-Stand [place] (Transfers) verbal cues;maximum assist (25% patient effort);2 person assist  -AS     Assistive Device (Sit-Stand Transfers) walker, front-wheeled  -AS     Comment, (Sit-Stand Transfer) 1 attempt with B UE support  -AS     Row Name 11/01/22 1019          Gait/Stairs (Locomotion)    [place] Level (Gait) unable to assess  -AS           User Key  (r) = Recorded By, (t) = Taken By, (c) = Cosigned By    Initials Name Provider Type    AS Marlen Kiran PTA Physical Therapist Assistant               Obj/Interventions     [facility] Name 11/01/22 1023          Motor Skills    Therapeutic Exercise knee;ankle;hip  -AS     Row Name 11/01/22 1023          Hip (Therapeutic Exercise)    Hip (Therapeutic Exercise) AAROM (active assistive range of motion)  -AS     Hip Strengthening (Therapeutic Exercise) bilateral;aBduction;aDduction;external rotation;internal rotation;supine;10 repetitions  -AS     Row Name 11/01/22 1023          Knee (Therapeutic Exercise)    Knee (Therapeutic Exercise) AROM (active range of motion)  -AS     Knee Strengthening (Therapeutic Exercise) bilateral;heel slides;supine;marching while seated;LAQ (long arc quad);sitting;10 repetitions  -AS     Row Name 11/01/22 1023          Ankle (Therapeutic Exercise)    Ankle (Therapeutic Exercise) AROM (active range of motion)  -AS     Ankle AROM (Therapeutic Exercise) bilateral;dorsiflexion;plantarflexion;sitting;10 repetitions  -AS     Row Name 11/01/22 1023          Balance    Static Standing Balance verbal cues;maximum assist;2-person assist  -AS     Position/Device Used, Standing Balance supported;walker, front-wheeled  -AS           User Key  (r) = Recorded By, (t) = Taken By, (c) = Cosigned By    Initials Name Provider Type    AS Magno  Marlen Love PTA Physical Therapist Assistant               Goals/Plan    No documentation.                Clinical Impression     Row Name 11/01/22 1024          Pain    Pretreatment Pain Rating 4/10  -AS     Posttreatment Pain Rating 4/10  -AS     Pain Location generalized  -AS     Pain Intervention(s) Repositioned;Ambulation/increased activity  -AS     Row Name 11/01/22 1024          Plan of Care Review    Plan of Care Reviewed With patient  -AS     Progress no change  -AS     Outcome Evaluation Patient completed rolling side to side with max assist x2, increased time and effort to complete supine<>sit transfer. Attempted 3 sit<>stands from EOB, Patient having difficulty following commands for proper hand/feet placement and anterior weight shifting, unable to clear bottom off bed despite max education and cues. B LE AAROM/AROM completed in supine and seated positions. Nursing notified that patient would benefit form lift room for safe transfers.  -AS     Row Name 11/01/22 1024          Positioning and Restraints    Pre-Treatment Position in bed  -AS     Post Treatment Position bed  -AS     In Bed supine;call light within reach;encouraged to call for assist;exit alarm on;side rails up x3;waffle boots/both  -AS           User Key  (r) = Recorded By, (t) = Taken By, (c) = Cosigned By    Initials Name Provider Type    AS Marlen Kiran PTA Physical Therapist Assistant               Outcome Measures     Row Name 11/01/22 1026          How much help from another person do you currently need...    Turning from your back to your side while in flat bed without using bedrails? 2  -AS     Moving from lying on back to sitting on the side of a flat bed without bedrails? 2  -AS     Moving to and from a bed to a chair (including a wheelchair)? 2  -AS     Standing up from a chair using your arms (e.g., wheelchair, bedside chair)? 2  -AS     Climbing 3-5 steps with a railing? 1  -AS     To walk in hospital room? 1  -AS      AM-PAC 6 Clicks Score (PT) 10  -AS     Highest level of mobility 4 --> Transferred to chair/commode  -AS     Row Name 11/01/22 1026          Functional Assessment    Outcome Measure Options AM-PAC 6 Clicks Basic Mobility (PT)  -AS           User Key  (r) = Recorded By, (t) = Taken By, (c) = Cosigned By    Initials Name Provider Type    AS Marlen Kiran PTA Physical Therapist Assistant                             Physical Therapy Education     Title: PT OT SLP Therapies (In Progress)     Topic: Physical Therapy (In Progress)     Point: Mobility training (In Progress)     Learning Progress Summary           Patient Acceptance, E, NR by AS at 11/1/2022 1026    Acceptance, E, NR by  at 10/31/2022 0927    Acceptance, E, NR by BA at 10/28/2022 1016    Acceptance, E, NR by BA at 10/25/2022 0921    Acceptance, E,TB, VU,NR by  at 10/20/2022 1254                   Point: Home exercise program (In Progress)     Learning Progress Summary           Patient Acceptance, E, NR by AS at 11/1/2022 1026    Acceptance, E, NR by FW at 10/31/2022 0927    Acceptance, E, NR by BA at 10/28/2022 1016    Acceptance, E, NR by BA at 10/25/2022 0921                   Point: Body mechanics (In Progress)     Learning Progress Summary           Patient Acceptance, E, NR by AS at 11/1/2022 1026    Acceptance, E, NR by FW at 10/31/2022 0927    Acceptance, E, NR by BA at 10/28/2022 1016    Acceptance, E, NR by BA at 10/25/2022 0921    Acceptance, E,TB, VU,NR by  at 10/20/2022 1254                   Point: Precautions (In Progress)     Learning Progress Summary           Patient Acceptance, E, NR by AS at 11/1/2022 1026    Acceptance, E, NR by FW at 10/31/2022 0927    Acceptance, E, NR by BA at 10/28/2022 1016    Acceptance, E, NR by BA at 10/25/2022 0921    Acceptance, E,TB, VU,NR by  at 10/20/2022 1254                               User Key     Initials Effective Dates Name Provider Type Discipline    AS 06/16/21 -  Marlen Kiran  SUMAYA Love Physical Therapist Assistant PT    FW 05/05/22 -  Jose Raul Perry, PT Physical Therapist PT    BA 09/21/21 -  Bridgette Villanueva, PT Physical Therapist PT     09/22/22 -  Veronica Harrison, ELVIS Physical Therapist PT              PT Recommendation and Plan     Plan of Care Reviewed With: patient  Progress: no change  Outcome Evaluation: Patient completed rolling side to side with max assist x2, increased time and effort to complete supine<>sit transfer. Attempted 3 sit<>stands from EOB, Patient having difficulty following commands for proper hand/feet placement and anterior weight shifting, unable to clear bottom off bed despite max education and cues. B LE AAROM/AROM completed in supine and seated positions. Nursing notified that patient would benefit form lift room for safe transfers.     Time Calculation:    PT Charges     Row Name 11/01/22 1026             Time Calculation    Start Time 0953  -AS      PT Received On 11/01/22  -AS      PT Goal Re-Cert Due Date 11/10/22  -AS         Timed Charges    35460 - PT Therapeutic Exercise Minutes 15  -AS      35498 - PT Therapeutic Activity Minutes 9  -AS         Total Minutes    Timed Charges Total Minutes 24  -AS       Total Minutes 24  -AS            User Key  (r) = Recorded By, (t) = Taken By, (c) = Cosigned By    Initials Name Provider Type    AS Marlen Kiran PTA Physical Therapist Assistant              Therapy Charges for Today     Code Description Service Date Service Provider Modifiers Qty    59271059650 HC PT THER PROC EA 15 MIN 11/1/2022 Marlen Kiran PTA GP 1    86478753197 HC PT THERAPEUTIC ACT EA 15 MIN 11/1/2022 Marlen Kiran PTA GP 1    82007450896 HC PT THER SUPP EA 15 MIN 11/1/2022 Marlen Kiran PTA GP 2          PT G-Codes  Outcome Measure Options: AM-PAC 6 Clicks Basic Mobility (PT)  AM-PAC 6 Clicks Score (PT): 10  AM-PAC 6 Clicks Score (OT): 12    Marlen Kiran PTA  11/1/2022

## 2022-11-01 NOTE — PROGRESS NOTES
[facility] Medicine Services  PROGRESS NOTE    Patient Name: Sol Lovelace  : 1940  MRN: [id number]    Date of Admission: 10/18/2022  Primary Care Physician: Young Huynh MD    Subjective     CC: f/u hyponatremia     HPI:  She is actually feeling much better today.  We discussed indwelling catheter vs. Intermittent catheterization and she would prefer to attempt intermittent catheterization until urinary retention resolves    ROS:  Gen- No fevers  GI- Improved symptoms    Objective     Vital Signs:   Temp:  [96.4 °F (35.8 °C)-97.6 °F (36.4 °C)] 97.2 °F (36.2 °C)  Heart Rate:  [66-76] 66  Resp:  [17-18] 17  BP: (107-157)/(64-94) 157/74     Physical Exam:  Constitutional: No acute distress, awake, alert, sitting up in bed  HENT: NCAT, mucous membranes moist  Respiratory: Respiratory effort normal   Musculoskeletal: No bilateral ankle edema  Psychiatric: Appropriate affect, cooperative  Neurologic: Speech clear and fluent  Skin: No rashes on exposed surfaces    Results Reviewed:    LAB RESULTS:      Lab 10/31/22  0656 10/29/22  1417 10/26/22  0916   WBC 9.10 14.32* 15.14*   HEMOGLOBIN 10.9* 11.3* 13.0   HEMATOCRIT 33.7* 33.6* 38.7   PLATELETS 162 170 221   MCV 84.3 83.0 80.5         Lab 22  0759 10/31/22  0656 10/30/22  0709 10/29/22  2039 10/29/22  1417 10/28/22  1620 10/28/22  0533 10/27/22  1846 10/27/22  1308   SODIUM 133* 126* 128* 129* 125*   < > 121*   < > 120*   POTASSIUM 3.8 3.4*  --   --  4.0  --  4.5  --  4.3   CHLORIDE 99 93*  --   --  90*  --  88*  --  87*   CO2 25.0 23.0  --   --  22.0  --  19.0*  --  23.0   ANION GAP 9.0 10.0  --   --  13.0  --  14.0  --  10.0   BUN 8 10  --   --  9  --  8  --  10   CREATININE 0.35* 0.32*  --   --  0.47*  --  0.36*  --  0.37*   EGFR 103.5 105.7  --   --  96.4  --  102.8  --  102.1   GLUCOSE 72 72  --   --  171*  --  78  --  113*   CALCIUM 8.6 8.2*  --   --  8.3*  --  8.0*  --  8.2*    < > = values in this interval not  displayed.     Brief Urine Lab Results  (Last result in the past 365 days)      Color   Clarity   Blood   Leuk Est   Nitrite   Protein   CREAT   Urine HCG        10/28/22 1142             34.0             Microbiology Results Abnormal     Procedure Component Value - Date/Time    Blood Culture - Blood, Hand, Right [[id number]]  (Normal) Collected: 10/19/22 2115    Lab Status: Final result Specimen: Blood from Hand, Right Updated: 10/24/22 2200     Blood Culture No growth at 5 days    Blood Culture - Blood, Arm, Left [[id number]]  (Normal) Collected: 10/19/22 2119    Lab Status: Final result Specimen: Blood from Arm, Left Updated: 10/24/22 2200     Blood Culture No growth at 5 days    Blood Culture - Blood, Arm, Left [[id number]]  (Normal) Collected: 10/18/22 1610    Lab Status: Final result Specimen: Blood from Arm, Left Updated: 10/23/22 1645     Blood Culture No growth at 5 days    COVID PRE-OP / PRE-PROCEDURE SCREENING ORDER (NO ISOLATION) - Swab, Nasal Cavity [[id number]]  (Normal) Collected: 10/18/22 1926    Lab Status: Final result Specimen: Swab from Nasal Cavity Updated: 10/18/22 2035    Narrative:      The following orders were created for panel order COVID PRE-OP / PRE-PROCEDURE SCREENING ORDER (NO ISOLATION) - Swab, Nasal Cavity.  Procedure                               Abnormality         Status                     ---------                               -----------         ------                     COVID-19 and FLU A/B PCR...[[id number]]  Normal              Final result                 Please view results for these tests on the individual orders.    COVID-19 and FLU A/B PCR - Swab, Nasopharynx [[id number]]  (Normal) Collected: 10/18/22 1926    Lab Status: Final result Specimen: Swab from Nasopharynx Updated: 10/18/22 2035     COVID19 Not Detected     Influenza A PCR Not Detected     Influenza B PCR Not Detected    Narrative:      Fact sheet for providers: https://www.fda.gov/media/497123/download    Fact  sheet for patients: https://www.fda.gov/media/326029/download    Test performed by PCR.        XR Abdomen KUB    Result Date: 10/30/2022  DATE OF EXAM: 10/30/2022 3:10 PM  PROCEDURE: XR ABDOMEN KUB-  INDICATIONS: Left sided pain; E87.8-Qqbq-frlmbwomtl and hyponatremia; A41.9-Sepsis, unspecified organism; G93.40-Encephalopathy, unspecified; E22.2-Syndrome of inappropriate secretion of antidiuretic hormone  COMPARISON: No Comparisons Available  TECHNIQUE: Single radiographic view of the abdomen was obtained.  FINDINGS: There are scattered pockets of large and small bowel gas in a nonspecific but nonobstructive pattern. There is no mass effect. There is no abnormal calcification. There are degenerative changes of the spine as well as scoliosis convex leftward.      Impression: Nonspecific gas pattern.  This report was finalized on 10/30/2022 5:09 PM by Barrie Jacobo MD.      Results for orders placed during the hospital encounter of 10/18/22    Adult Transthoracic Echo Complete W/ Cont if Necessary Per Protocol    Interpretation Summary  •  Estimated left ventricular EF = 55% Left ventricular systolic function is normal.  •  Estimated right ventricular systolic pressure from tricuspid regurgitation is normal (<35 mmHg). Calculated right ventricular systolic pressure from tricuspid regurgitation is 28 mmHg.    I have reviewed the medications:  Scheduled Meds:acetaminophen, 650 mg, Oral, 4x Daily  apixaban, 5 mg, Oral, Q12H  atorvastatin, 20 mg, Oral, Daily  bisacodyl, 10 mg, Oral, Daily  bisoprolol, 10 mg, Oral, Q24H  budesonide-formoterol, 2 puff, Inhalation, BID - RT  NIFEdipine XL, 30 mg, Oral, Q24H  pantoprazole, 40 mg, Oral, Q AM  predniSONE, 20 mg, Oral, Daily  sodium chloride, 10 mL, Intravenous, Q12H  tamsulosin, 0.4 mg, Oral, Daily      Continuous Infusions:   PRN Meds:.•  albuterol  •  calcium gluconate **AND** calcium gluconate IVPB **AND** Calcium, Ionized  •  ipratropium-albuterol  •  magnesium sulfate  **OR** magnesium sulfate **OR** magnesium sulfate  •  Morphine  •  ondansetron **OR** ondansetron  •  potassium chloride **OR** potassium chloride **OR** potassium chloride  •  potassium phosphate infusion greater than 15 mMoles **OR** potassium phosphate infusion greater than 15 mMoles **OR** potassium phosphate **OR** sodium phosphate IVPB **OR** sodium phosphate IVPB **OR** sodium phosphate IVPB  •  simethicone  •  sodium chloride    Assessment & Plan     Active Hospital Problems    Diagnosis  POA   • **Sepsis ([facility]) [A41.9]  Yes   • Acute urinary retention [R33.8]  No   • Current chronic use of systemic steroids [Z79.52]  Not Applicable   • Adrenal insufficiency due to corticosteroid withdrawal ([facility]) [E27.3, T38.0X5A]  Yes   • Morbid obesity with BMI of 40.0-44.9, adult ([facility]) [E66.01, Z68.41]  Not Applicable   • Moderate asthma with exacerbation [J45.901]  Yes   • E. coli UTI [N39.0, B96.20]  Yes   • Hyponatremia [E87.1]  Yes   • Elevated liver enzymes [R74.8]  Yes   • Hypoalbuminemia [E88.09]  Yes   • Leukocytosis [D72.829]  Yes   • Proteinuria [R80.9]  Yes   • Paroxysmal atrial fibrillation (H[facility]) [I48.0]  Yes   • Essential hypertension [I10]  Yes      Resolved Hospital Problems   No resolved problems to display.     Brief Hospital Course to date:  Sol Lovelace is a 82 y.o. female with HTN, HLD, asthma, atrial fibrillation (not anticoagulated) and RA (on chronic prednisone 10mg daily) who presented to the ED on 10/18/22 for evaluation of dyspnea and AMS. She was found to have hyponatremia, concern for HCTZ-induced. Admitted to ICU, treated with high dose steroids and IVF with improvement in sodium while treating asthma exacerbation, with improvement of her sodium. Transitioned to floor on 10/22 and prednisone 10 mg resumed with worsening of hyponatremia.    This patient's problems and plans were partially entered by my partner and updated as appropriate by me 11/01/22.    Significant asymptomatic hyponatremia  2/2 relative adrenal insufficiency in setting of chronic steroid use  - Relative adrenal insufficiency likely etiology, in combination with HCTZ  - prednisone increased to 20 mg on 10/27 with continued improvement in sodium. Keep at prednisone 20 mg until outpatient rheumatology follow-up as may require long taper    Acute urinary retention  -Continue tamsulosin   -Constipation resolved  -D/C indwelling catheter and I/O cath as needed.  If retaining urine at the time of discharge will need I/O cath q6 hours as long as she is unable to urinate on her own and outpatient urology follow up    Paroxysmal Afib   - per patient this is new diagnosis   -rate controlled currently on bisprolol  -CHADVASC=3 (female, age, HTN). Eliquis started this admission after counseling    Chronic Steroid Use due to RA   - increased home prednisone to 20 mg daily as above    HTN - home bisprolol, nifedipine added this admission  HLD - home statin  BMI 41 - complicates volume assessment and care    RESOLVED:  Right Perihilar Opacity, possible pneumonia  -seen on CXR 10/25 in setting of hypoxia and leukocytosis  -doxy/omnicef x 5 days with improvement in hypoxia (stop date 10/29)  -repeat CXR is recommended to document resolution. Can obtain w PCP in 4-6 weeks    Rectal stool burden - Manual disimpaction 10/27 with significant stool release  E. Coli UTI - s/p IV Rocephin x 7 days  Asthma Exacerbation - on high dose steroids in ICU as above, now completed    Expected Discharge Location and Transportation: [facility]  Expected Discharge Date:   Expected Discharge  Expected Discharge Date and Time     Expected Discharge Date Expected Discharge Time    Nov 2, 2022           DVT prophylaxis:Medical DVT prophylaxis orders are present.     AM-PAC 6 Clicks Score (PT): 10 (11/01/22 1026)    CODE STATUS:   Code Status and Medical Interventions:   Ordered at: 10/18/22 2222     Medical Intervention Limits:    NO intubation (DNI)     Level Of Support Discussed  With:    Next of Kin (If No Surrogate)     Code Status (Patient has no pulse and is not breathing):    No CPR (Do Not Attempt to Resuscitate)     Medical Interventions (Patient has pulse or is breathing):    Limited Support     Comments:    daughter reports patient has living will and does not wish to be resuscitated     Release to patient:    Routine Release     Veronica Metcalf MD  11/01/22

## 2022-11-01 NOTE — PLAN OF CARE
Problem: Adult Inpatient Plan of Care  Goal: Plan of Care Review  Outcome: Ongoing, Progressing  Flowsheets  Taken 11/1/2022 1847 by Marlen Fletcher, RN  Outcome Evaluation: pt alert and oriented x 4. covid swab sent to lab for d/c tomorrow. gerber removed at 1710.  Taken 11/1/2022 1033 by Janeth Oneil OT  Plan of Care Reviewed With: patient  Goal: Patient-Specific Goal (Individualized)  Outcome: Ongoing, Progressing  Goal: Absence of Hospital-Acquired Illness or Injury  Outcome: Ongoing, Progressing  Intervention: Identify and Manage Fall Risk  Description: Perform standard risk assessment on admission using a validated tool or comprehensive approach appropriate to the patient; reassess fall risk frequently, with change in status or transfer to another level of care.  Communicate fall injury risk to interprofessional healthcare team.  Determine need for increased observation, equipment and environmental modification, such as low bed, signage and supportive, nonskid footwear.  Adjust safety measures to individual developmental age, stage and identified risk factors.  Reinforce the importance of safety and physical activity with patient and family.  Perform regular intentional rounding to assess need for position change, pain assessment and personal needs, including assistance with toileting.  Recent Flowsheet Documentation  Taken 11/1/2022 1841 by Marlen Fletcher, RN  Safety Promotion/Fall Prevention:   activity supervised   assistive device/personal items within reach   clutter free environment maintained   fall prevention program maintained   nonskid shoes/slippers when out of bed   room organization consistent   safety round/check completed   toileting scheduled  Taken 11/1/2022 1614 by Marlen Fletcher, RN  Safety Promotion/Fall Prevention:   activity supervised   assistive device/personal items within reach   clutter free environment maintained   fall prevention program maintained   nonskid shoes/slippers  when out of bed   room organization consistent   safety round/check completed  Taken 11/1/2022 1401 by Marlen Fletcher RN  Safety Promotion/Fall Prevention:   activity supervised   assistive device/personal items within reach   clutter free environment maintained   fall prevention program maintained   nonskid shoes/slippers when out of bed   room organization consistent   safety round/check completed   toileting scheduled  Taken 11/1/2022 0859 by Marlen Fletcher, RN  Safety Promotion/Fall Prevention:   activity supervised   clutter free environment maintained   assistive device/personal items within reach   fall prevention program maintained   nonskid shoes/slippers when out of bed   room organization consistent   safety round/check completed  Intervention: Prevent Skin Injury  Description: Perform a screening for skin injury risk, such as pressure or moisture associated skin damage on admission and at regular intervals throughout hospital stay.  Keep all areas of skin (especially folds) clean and dry.  Maintain adequate skin hydration.  Relieve and redistribute pressure and protect bony prominences; implement measures based on patient-specific risk factors.  Match turning and repositioning schedule to clinical condition.  Encourage weight shift frequently; assist with reposition if unable to complete independently.  Float heels off bed; avoid pressure on the Achilles tendon.  Keep skin free from extended contact with medical devices.  Encourage functional activity and mobility, as early as tolerated.  Use aids (e.g., slide boards, mechanical lift) during transfer.  Recent Flowsheet Documentation  Taken 11/1/2022 1259 by Marlen Fletcher, RN  Body Position: weight shifting  Taken 11/1/2022 0859 by Marlen Fletcher, RN  Body Position: weight shifting  Skin Protection: tubing/devices free from skin contact  Intervention: Prevent and Manage VTE (Venous Thromboembolism) Risk  Description: Assess for VTE (venous  thromboembolism) risk.  Encourage and assist with early ambulation.  Initiate and maintain compression or other therapy, as indicated, based on identified risk in accordance with organizational protocol and provider order.  Encourage both active and passive leg exercises while in bed, if unable to ambulate.  Recent Flowsheet Documentation  Taken 11/1/2022 0859 by Marlen Fletcher RN  Activity Management: activity adjusted per tolerance  VTE Prevention/Management: (eliquis) other (see comments)  Range of Motion: active ROM (range of motion) encouraged  Intervention: Prevent Infection  Description: Maintain skin and mucous membrane integrity; promote hand, oral and pulmonary hygiene.  Optimize fluid balance, nutrition, sleep and glycemic control to maximize infection resistance.  Identify potential sources of infection early to prevent or mitigate progression of infection (e.g., wound, lines, devices).  Evaluate ongoing need for invasive devices; remove promptly when no longer indicated.  Recent Flowsheet Documentation  Taken 11/1/2022 1841 by Marlen Fletcher RN  Infection Prevention: environmental surveillance performed  Taken 11/1/2022 1401 by Marlen Fletcher RN  Infection Prevention: environmental surveillance performed  Taken 11/1/2022 0859 by Marlen Fletcher RN  Infection Prevention: environmental surveillance performed  Goal: Optimal Comfort and Wellbeing  Outcome: Ongoing, Progressing  Intervention: Provide Person-Centered Care  Description: Use a family-focused approach to care.  Develop trust and rapport by proactively providing information, encouraging questions, addressing concerns and offering reassurance.  Acknowledge emotional response to hospitalization.  Recognize and utilize personal coping strategies.  Honor spiritual and cultural preferences.  Recent Flowsheet Documentation  Taken 11/1/2022 0859 by Marlen Fletcher RN  Trust Relationship/Rapport:   care explained   choices provided   thoughts/feelings  acknowledged  Goal: Readiness for Transition of Care  Outcome: Ongoing, Progressing     Problem: Fall Injury Risk  Goal: Absence of Fall and Fall-Related Injury  Outcome: Ongoing, Progressing  Intervention: Identify and Manage Contributors  Description: Develop a fall prevention plan with the patient and caregiver/family.  Provide reorientation, appropriate sensory stimulation and routines with changes in mental status to decrease risk of fall.  Promote use of personal vision and auditory aids.  Assess assistance level required for safe and effective self-care; provide support as needed, such as toileting, mobilization. For age 65 and older, implement timed toileting with assistance.  Encourage physical activity, such as performance of mobility and self-care at highest level of patient ability, multicomponent exercise program and provision of appropriate assistive devices.  If fall occurs, assess the severity of injury; implement fall injury protocol. Determine the cause and revise fall injury prevention plan.  Regularly review medication contribution to fall risk; adjust medication administration times to minimize risk of falling.  Consider risk related to polypharmacy and age.  Balance adequate pain management with potential for oversedation.  Recent Flowsheet Documentation  Taken 11/1/2022 1841 by Marlen Fletcher RN  Medication Review/Management: medications reviewed  Taken 11/1/2022 1614 by Marlen Fletcher, RN  Medication Review/Management: medications reviewed  Taken 11/1/2022 1401 by Marlen Fletcher, RN  Medication Review/Management: medications reviewed  Taken 11/1/2022 0859 by Marlen Fletcher, RN  Medication Review/Management: medications reviewed  Intervention: Promote Injury-Free Environment  Description: Provide a safe, barrier-free environment that encourages independent activity.  Keep care area uncluttered and well-lighted.  Determine need for increased observation or monitoring.  Avoid use of devices  that minimize mobility, such as restraints or indwelling urinary catheter.  Recent Flowsheet Documentation  Taken 11/1/2022 1841 by Marlen Fletcher RN  Safety Promotion/Fall Prevention:   activity supervised   assistive device/personal items within reach   clutter free environment maintained   fall prevention program maintained   nonskid shoes/slippers when out of bed   room organization consistent   safety round/check completed   toileting scheduled  Taken 11/1/2022 1614 by Marlen Fletcher, RN  Safety Promotion/Fall Prevention:   activity supervised   assistive device/personal items within reach   clutter free environment maintained   fall prevention program maintained   nonskid shoes/slippers when out of bed   room organization consistent   safety round/check completed  Taken 11/1/2022 1401 by Marlen Fletcher RN  Safety Promotion/Fall Prevention:   activity supervised   assistive device/personal items within reach   clutter free environment maintained   fall prevention program maintained   nonskid shoes/slippers when out of bed   room organization consistent   safety round/check completed   toileting scheduled  Taken 11/1/2022 0859 by Marlen Fletcher, RN  Safety Promotion/Fall Prevention:   activity supervised   clutter free environment maintained   assistive device/personal items within reach   fall prevention program maintained   nonskid shoes/slippers when out of bed   room organization consistent   safety round/check completed     Problem: Skin Injury Risk Increased  Goal: Skin Health and Integrity  Outcome: Ongoing, Progressing  Intervention: Optimize Skin Protection  Description: Perform a full pressure injury risk assessment, as indicated by screening, upon admission to care unit.  Reassess skin (injury risk, full inspection) frequently (e.g., scheduled interval, with change in condition) to provide optimal early detection and prevention.  Maintain adequate tissue perfusion (e.g., encourage fluid balance;  avoid crossing legs, constrictive clothing or devices) to promote tissue oxygenation.  Maintain head of bed at lowest degree of elevation tolerated, considering medical condition and other restrictions.  Avoid positioning onto an area that remains reddened.  Minimize incontinence and moisture (e.g., toileting schedule; moisture-wicking pad, diaper or incontinence collection device; skin moisture barrier).  Cleanse skin promptly and gently when soiled utilizing a pH-balanced cleanser.  Relieve and redistribute pressure (e.g., scheduled position changes, weight shifts, use of support surface, medical device repositioning, protective dressing application, use of positioning device, microclimate control, use of pressure-injury-monitor  Encourage increased activity, such as sitting in a chair at the bedside or early mobilization, when able to tolerate.  Recent Flowsheet Documentation  Taken 11/1/2022 1614 by Marlen Fletcher RN  Head of Bed (HOB) Positioning: HOB elevated  Taken 11/1/2022 1401 by Marlen Fletcher RN  Head of Bed (HOB) Positioning: HOB elevated  Taken 11/1/2022 1259 by Marlen Fletcher RN  Head of Bed (HOB) Positioning: HOB elevated  Taken 11/1/2022 0859 by Marlen Fletcher RN  Pressure Reduction Techniques: frequent weight shift encouraged  Head of Bed (HOB) Positioning: HOB elevated  Pressure Reduction Devices: pressure-redistributing mattress utilized  Skin Protection: tubing/devices free from skin contact     Problem: Adjustment to Illness (Sepsis/Septic Shock)  Goal: Optimal Coping  Outcome: Ongoing, Progressing  Intervention: Optimize Psychosocial Adjustment to Illness  Description: Acknowledge, normalize, validate intensity and complexity of patient and support system response to situation.  Provide opportunity for expression of thoughts, feelings and concerns; respond with compassion and reassurance.  Decrease stress and anxiety by providing information about patient’s status and  treatment.  Facilitate support system presence and participation in care; consider providing a diary in intensive care situation.  Support coping by recognizing current coping strategies; provide aid in developing new strategies.  Acknowledge and normalize difficulty in managing life-long lifestyle changes and expectations.  Assess and monitor for signs and symptoms of psychologic distress, anxiety and depression.  Consider palliative care consult for goals of care conversation, if the condition is worsening despite treatment.  Recent Flowsheet Documentation  Taken 11/1/2022 0859 by Marlen Fletcher RN  Family/Support System Care: support provided     Problem: Bleeding (Sepsis/Septic Shock)  Goal: Absence of Bleeding  Outcome: Ongoing, Progressing     Problem: Glycemic Control Impaired (Sepsis/Septic Shock)  Goal: Blood Glucose Level Within Desired Range  Outcome: Ongoing, Progressing     Problem: Infection Progression (Sepsis/Septic Shock)  Goal: Absence of Infection Signs and Symptoms  Outcome: Ongoing, Progressing  Intervention: Initiate Sepsis Management  Description: Provide fluid therapy, such as crystalloid or albumin, to increase intravascular volume, organ perfusion and oxygen delivery.  Provide respiratory support, such as oxygen therapy, noninvasive or invasive positive pressure ventilation, to achieve oxygenation and ventilation goal; avoid hyperoxemia.  Obtain cultures prior to initiating antimicrobial therapy when possible. Do not delay for laboratory results in the presence of high suspicion or clinical indicators.  Administer intravenous broad-spectrum antimicrobial therapy promptly.  Implement hemodynamic monitoring to guide intravascular support based on individual targeted parameters.  Determine and address underlying source of infection aggressively; implement transmission-based precautions and isolation, as indicated.  Recent Flowsheet Documentation  Taken 11/1/2022 1841 by Marlen Fletcher,  RN  Infection Prevention: environmental surveillance performed  Isolation Precautions: precautions maintained  Taken 11/1/2022 1614 by Marlen Fletcher RN  Isolation Precautions: precautions maintained  Taken 11/1/2022 1401 by Marlen Fletcher RN  Infection Prevention: environmental surveillance performed  Isolation Precautions: precautions maintained  Taken 11/1/2022 0859 by Marlen Fletcher RN  Infection Prevention: environmental surveillance performed  Intervention: Promote Recovery  Description: Encourage pulmonary hygiene, such as cough-enhancement and airway-clearance techniques, that may include use of incentive spirometry, deep breathing and cough.  Encourage early rehabilitation and physical activity to optimize functional ability and activity tolerance, as well as minimize delirium.  Promote energy conservation; minimize oxygen demand and consumption by adjusting environment, decreasing stimulation, maintaining normothermia and treating pain.  Optimize fluid balance, nutrition intake, sleep and glycemic control to maintain tissue perfusion and enhance immune response.  Recent Flowsheet Documentation  Taken 11/1/2022 0859 by Marlen Fletcher RN  Activity Management: activity adjusted per tolerance     Problem: Nutrition Impaired (Sepsis/Septic Shock)  Goal: Optimal Nutrition Intake  Outcome: Ongoing, Progressing     Problem: UTI (Urinary Tract Infection)  Goal: Improved Infection Symptoms  Outcome: Ongoing, Progressing  Intervention: Facilitate Optimal Urinary Elimination  Description: Monitor bowel and voiding pattern; promote good elimination habits (e.g., quick response to urge, frequent and complete emptying of bladder with voiding).  Provide incontinence products, such as moisture wicking incontinence pads and incontinence underwear.  Assess bladder volume with ultrasound prior to intermittent catheterization or to determine residual volume after catheterization or void; avoid overdistension of  bladder.  Seek alternative to indwelling catheter, such as intermittent catheterization.  If indwelling catheter required, insert the smallest diameter catheter possible using aseptic technique; utilize a securement device.  Maintain closed collection system and unobstructed urine flow; position bag below bladder level and avoid tubing kinks.  Empty collection bag regularly, such as when 2/3 full and before transport.  Review necessity for indwelling catheter daily; advocate for prompt removal.  Recent Flowsheet Documentation  Taken 11/1/2022 0859 by Marlen Fletcher, RN  Urinary Elimination Promotion:   catheter patency maintained   absorbent pad/diaper use encouraged   Goal Outcome Evaluation:              Outcome Evaluation: pt alert and oriented x 4. covid swab sent to lab for d/c tomorrow. gerber removed at 1710.

## 2022-11-01 NOTE — THERAPY TREATMENT NOTE
Patient Name: Sol Lovelace  : 1940    MRN: [id number]                              Today's Date: 2022       Admit Date: 10/18/2022    Visit Dx:     ICD-10-CM ICD-9-CM   1. Hyponatremia  E87.1 276.1   2. Sepsis, due to unspecified organism, unspecified whether acute organ dysfunction present ([facility])  A41.9 038.9     995.91   3. Acute encephalopathy  G93.40 348.30   4. SIADH (syndrome of inappropriate ADH production) ([facility])  E22.2 253.6     Patient Active Problem List   Diagnosis   • E. coli UTI   • Hyponatremia   • Elevated liver enzymes   • Hypoalbuminemia   • Leukocytosis   • Proteinuria   • Sepsis ([facility])   • Paroxysmal atrial fibrillation ([facility])   • Essential hypertension   • Acute urinary retention   • Current chronic use of systemic steroids   • Adrenal insufficiency due to corticosteroid withdrawal ([facility])   • Morbid obesity with BMI of 40.0-44.9, adult ([facility])   • Moderate asthma with exacerbation     Past Medical History:   Diagnosis Date   • Asthma    • Chronic back pain    • GERD (gastroesophageal reflux disease)    • Hyperlipidemia    • Hypertension    • Osteoarthritis    • Rheumatoid arthritis ([facility])      Past Surgical History:   Procedure Laterality Date   • LAPAROSCOPIC VAGINAL HYSTERECTOMY      BSO, bladder suspension      General Information     Row Name 22 1029          OT Time and Intention    Document Type progress note/recertification  -     Mode of Treatment occupational therapy  -     Row Name 22 1029          General Information    Patient Profile Reviewed yes  -     Existing Precautions/Restrictions fall;oxygen therapy device and L/min;other (see comments)  gerber  -     Barriers to Rehab medically complex;previous functional deficit  -     Row Name 22 1029          Cognition    Orientation Status (Cognition) oriented x 3  -     Row Name 22 1029          Safety Issues, Functional Mobility    Safety Issues Affecting Function (Mobility) safety precautions  follow-through/compliance;safety precaution awareness;insight into deficits/self-awareness;ability to follow commands;awareness of need for assistance;judgment;problem-solving;sequencing abilities  -     Impairments Affecting Function (Mobility) balance;coordination;endurance/activity tolerance;motor planning;pain;postural/trunk control;shortness of breath;strength  -           User Key  (r) = Recorded By, (t) = Taken By, (c) = Cosigned By    Initials Name Provider Type     Janeth Oneil OT Occupational Therapist                 Mobility/ADL's     Row Name 11/01/22 1030          Activities of Daily Living    BADL Assessment/Intervention grooming  -     Row Name 11/01/22 1030          Grooming Assessment/Training    [place] Level (Grooming) hair care, combing/brushing;minimum assist (75% patient effort)  -     Position (Grooming) sitting up in bed  -     Comment, (Grooming) Pt had already completed oral care with setup from RN on OT entry. Required Miladys to reach back of hair and washed face with setup. Extra time/effort required.  -           User Key  (r) = Recorded By, (t) = Taken By, (c) = Cosigned By    Initials Name Provider Type     Janeth Oneil OT Occupational Therapist               Obj/Interventions     Row Name 11/01/22 1031          Shoulder (Therapeutic Exercise)    Shoulder (Therapeutic Exercise) AROM (active range of motion)  -     Shoulder AROM (Therapeutic Exercise) bilateral;flexion;extension;aBduction;aDduction;supine;10 repetitions  -     Row Name 11/01/22 1031          Elbow/Forearm (Therapeutic Exercise)    Elbow/Forearm (Therapeutic Exercise) AROM (active range of motion)  -     Elbow/Forearm AROM (Therapeutic Exercise) bilateral;flexion;extension;supination;pronation;supine;10 repetitions  -     Row Name 11/01/22 1031          Wrist (Therapeutic Exercise)    Wrist AROM (Therapeutic Exercise) bilateral;flexion;extension;10 repetitions  -     Row Name  11/01/22 1031          Hand (Therapeutic Exercise)    Hand (Therapeutic Exercise) AROM (active range of motion)  -     Hand AROM/AAROM (Therapeutic Exercise) bilateral;AROM (active range of motion);finger flexion;finger extension;10 repetitions  -     Row Name 11/01/22 1031          Motor Skills    Therapeutic Exercise shoulder;elbow/forearm;wrist;hand  -           User Key  (r) = Recorded By, (t) = Taken By, (c) = Cosigned By    Initials Name Provider Type     Janeth Oneil OT Occupational Therapist               Goals/Plan     Row Name 11/01/22 1034          Bed Mobility Goal 1 (OT)    Activity/Assistive Device (Bed Mobility Goal 1, OT) rolling to left;rolling to right;sit to supine;supine to sit  -     [place] Level/Cues Needed (Bed Mobility Goal 1, OT) moderate assist (50-74% patient effort);verbal cues required  -     Time Frame (Bed Mobility Goal 1, OT) long term goal (LTG);10 days  -HM     Progress/Outcomes (Bed Mobility Goal 1, OT) progress slower than expected;goal ongoing  -     Row Name 11/01/22 1034          Transfer Goal 1 (OT)    Activity/Assistive Device (Transfer Goal 1, OT) sit-to-stand/stand-to-sit;bed-to-chair/chair-to-bed;commode, bedside without drop arms;walker, rolling  -     [place] Level/Cues Needed (Transfer Goal 1, OT) moderate assist (50-74% patient effort);verbal cues required  -     Time Frame (Transfer Goal 1, OT) long term goal (LTG);10 days  -HM     Progress/Outcome (Transfer Goal 1, OT) progress slower than expected;goal ongoing  -     Row Name 11/01/22 1034          Dressing Goal 1 (OT)    Activity/Device (Dressing Goal 1, OT) lower body dressing  -     [place]/Cues Needed (Dressing Goal 1, OT) moderate assist (50-74% patient effort);verbal cues required  -     Time Frame (Dressing Goal 1, OT) long term goal (LTG);10 days  -HM     Progress/Outcome (Dressing Goal 1, OT) goal no longer appropriate  -     Row Name 11/01/22 1034           Grooming Goal 1 (OT)    Activity/Device (Grooming Goal 1, OT) hair care;oral care;wash face, hands  -     [place] (Grooming Goal 1, OT) supervision required  -     Time Frame (Grooming Goal 1, OT) long term goal (LTG);10 days  -     Progress/Outcome (Grooming Goal 1, OT) goal revised this date  -           User Key  (r) = Recorded By, (t) = Taken By, (c) = Cosigned By    Initials Name Provider Type     Janeth Oneil, OT Occupational Therapist               Clinical Impression     Row Name 11/01/22 1033          Pain Assessment    Pretreatment Pain Rating 4/10  -     Posttreatment Pain Rating 4/10  -     Pain Location generalized  -     Pain Location - abdomen  -     Pain Intervention(s) Ambulation/increased activity;Repositioned  -     Row Name 11/01/22 1033          Plan of Care Review    Plan of Care Reviewed With patient  -     Progress no change  -     Outcome Evaluation OT progress note completed this session. Pt completed all oral care with setup. Required Miladys to reach back of hair for completion of hair care. Washed face with setup. Completed AROM x10 reps shoulder, elbow, wrist, hand. Pt would benefit from lift room. Recommend d/c to SNF.  -     Row Name 11/01/22 1033          Therapy Assessment/Plan (OT)    Patient/Family Therapy Goal Statement (OT) Pt would like to improve and return home.  -     Rehab Potential (OT) good, to achieve stated therapy goals  -     Criteria for Skilled Therapeutic Interventions Met (OT) yes;skilled treatment is necessary  -     Therapy Frequency (OT) daily  -     Row Name 11/01/22 1033          Therapy Plan Review/Discharge Plan (OT)    Anticipated Discharge Disposition (OT) skilled nursing facility  -     Row Name 11/01/22 1033          Vital Signs    Pre Systolic BP Rehab --  RN cleared for tx; VSS  -     O2 Delivery Pre Treatment room air  -HM     O2 Delivery Intra Treatment room air  -HM     O2 Delivery Post Treatment room  air  -HM     Pre Patient Position Supine  -HM     Intra Patient Position Supine  -HM     Post Patient Position Supine  -HM     Row Name 11/01/22 1033          Positioning and Restraints    Pre-Treatment Position in bed  -HM     Post Treatment Position bed  -HM     In Bed notified nsg;supine;call light within reach;encouraged to call for assist;exit alarm on  -HM           User Key  (r) = Recorded By, (t) = Taken By, (c) = Cosigned By    Initials Name Provider Type     Janeth Oneil, OT Occupational Therapist               Outcome Measures     Row Name 11/01/22 1035          How much help from another is currently needed...    Putting on and taking off regular lower body clothing? 1  -HM     Bathing (including washing, rinsing, and drying) 2  -HM     Toileting (which includes using toilet bed pan or urinal) 1  -HM     Putting on and taking off regular upper body clothing 2  -HM     Taking care of personal grooming (such as brushing teeth) 3  -HM     Eating meals 3  -     AM-PAC 6 Clicks Score (OT) 12  -     Row Name 11/01/22 1026          How much help from another person do you currently need...    Turning from your back to your side while in flat bed without using bedrails? 2  -AS     Moving from lying on back to sitting on the side of a flat bed without bedrails? 2  -AS     Moving to and from a bed to a chair (including a wheelchair)? 2  -AS     Standing up from a chair using your arms (e.g., wheelchair, bedside chair)? 2  -AS     Climbing 3-5 steps with a railing? 1  -AS     To walk in hospital room? 1  -AS     AM-PAC 6 Clicks Score (PT) 10  -AS     Highest level of mobility 4 --> Transferred to chair/commode  -AS     Row Name 11/01/22 1035 11/01/22 1026       Functional Assessment    Outcome Measure Options AM-PAC 6 Clicks Daily Activity (OT)  - AM-PAC 6 Clicks Basic Mobility (PT)  -AS          User Key  (r) = Recorded By, (t) = Taken By, (c) = Cosigned By    Initials Name Provider Type    AS Magno  Marlen Love, SUMAYA Physical Therapist Assistant     Janeth Oneil OT Occupational Therapist                Occupational Therapy Education     Title: PT OT SLP Therapies (In Progress)     Topic: Occupational Therapy (Done)     Point: ADL training (Done)     Description:   Instruct learner(s) on proper safety adaptation and remediation techniques during self care or transfers.   Instruct in proper use of assistive devices.              Learning Progress Summary           Patient Acceptance, E,TB,D, VU,NR by  at 11/1/2022 1036    Acceptance, E, VU by  at 10/27/2022 1517    Acceptance, E,D, NR by  at 10/25/2022 0922    Acceptance, E,TB, VU,NR by [facility] at 10/20/2022 1209    Acceptance, E, NR by  at 10/20/2022 0922                   Point: Home exercise program (Done)     Description:   Instruct learner(s) on appropriate technique for monitoring, assisting and/or progressing therapeutic exercises/activities.              Learning Progress Summary           Patient Acceptance, E,TB,D, VU,NR by  at 11/1/2022 1036    Acceptance, E, VU by  at 10/27/2022 1517    Acceptance, E,D, NR by  at 10/25/2022 0922                   Point: Precautions (Done)     Description:   Instruct learner(s) on prescribed precautions during self-care and functional transfers.              Learning Progress Summary           Patient Acceptance, E,TB,D, VU,NR by  at 11/1/2022 1036    Acceptance, E,D, NR by  at 10/25/2022 0922    Acceptance, E,TB, VU,NR by [facility] at 10/20/2022 1209    Acceptance, E, NR by  at 10/20/2022 0922                   Point: Body mechanics (Done)     Description:   Instruct learner(s) on proper positioning and spine alignment during self-care, functional mobility activities and/or exercises.              Learning Progress Summary           Patient Acceptance, E,TB,D, VU,NR by  at 11/1/2022 1036    Acceptance, E,D, NR by  at 10/25/2022 0922    Acceptance, E,TB, VU,NR by [facility] at 10/20/2022 1209     Acceptance, E, NR by  at 10/20/2022 0922                               User Key     Initials Effective Dates Name Provider Type Discipline     10/25/22 -  Janeth Oneil OT Occupational Therapist OT     06/16/21 -  Lily De La Cruz, OT Occupational Therapist OT     06/16/21 -  Nataly Brock OT Occupational Therapist OT     10/14/22 -  Natalie Mack, OT Occupational Therapist OT    [facility] 09/22/22 -  Veronica Harrison, ELVIS Physical Therapist PT              OT Recommendation and Plan  Therapy Frequency (OT): daily  Plan of Care Review  Plan of Care Reviewed With: patient  Progress: no change  Outcome Evaluation: OT progress note completed this session. Pt completed all oral care with setup. Required Miladys to reach back of hair for completion of hair care. Washed face with setup. Completed AROM x10 reps shoulder, elbow, wrist, hand. Pt would benefit from lift room. Recommend d/c to SNF.     Time Calculation:    Time Calculation- OT     Row Name 11/01/22 1014             Time Calculation- OT    OT Received On 11/01/22  -      OT Goal Re-Cert Due Date 11/11/22  -         Timed Charges    41311 - OT Self Care/Mgmt Minutes 13  -HM         Total Minutes    Timed Charges Total Minutes 13  -HM       Total Minutes 13  -HM            User Key  (r) = Recorded By, (t) = Taken By, (c) = Cosigned By    Initials Name Provider Type     Janeth Oneil OT Occupational Therapist              Therapy Charges for Today     Code Description Service Date Service Provider Modifiers Qty    [id number] HC OT SELF CARE/MGMT/TRAIN EA 15 MIN 11/1/2022 Janeth Oneil OT GO 1               Janeth Oneil OT  11/1/2022

## 2022-11-01 NOTE — PROGRESS NOTES
"   LOS: 14 days    Patient Care Team:  Young Huynh MD as PCP - General (Family Medicine)    Subjective   Seen and examined at bedside. Feeling better, no complains.   Denies chest pain or shortness of breath.       Objective     acetaminophen, 650 mg, Oral, 4x Daily  apixaban, 5 mg, Oral, Q12H  atorvastatin, 20 mg, Oral, Daily  bisacodyl, 10 mg, Oral, Daily  bisoprolol, 10 mg, Oral, Q24H  budesonide-formoterol, 2 puff, Inhalation, BID - RT  NIFEdipine XL, 30 mg, Oral, Q24H  pantoprazole, 40 mg, Oral, Q AM  predniSONE, 20 mg, Oral, Daily  sodium chloride, 10 mL, Intravenous, Q12H  tamsulosin, 0.4 mg, Oral, Daily         Vital Signs:  Blood pressure 121/64, pulse 66, temperature 97.9 °F (36.6 °C), temperature source Oral, resp. rate 17, height 162.6 cm (64.02\"), weight 109 kg (240 lb), SpO2 95 %.    Flowsheet Rows    Flowsheet Row First Filed Value   Admission Height 162.6 cm (64\") Documented at 10/18/2022 1547   Admission Weight 69.9 kg (154 lb) Documented at 10/18/2022 1547          10/31 0701 - 11/01 0700  In: 430 [P.O.:430]  Out: 2400 [Urine:2400]    Physical Exam:  General Appearance: NAD, alert and cooperative, Ox3  Eyes: PER, conjunctivae and sclerae normal, no icterus  Lungs: respirations regular and unlabored, no crepitus, clear to auscultation  Heart/CV: regular rhythm & normal rate, no murmur, no gallop, no rub and trace edema  Abdomen: not distended, soft, non-tender, no masses,  bowel sounds present  Skin: No rash, Warm and dry    Labs:  Results from last 7 days   Lab Units 10/31/22  0656 10/29/22  1417 10/26/22  0916   WBC 10*3/mm3 9.10 14.32* 15.14*   HEMOGLOBIN g/dL 10.9* 11.3* 13.0   HEMATOCRIT % 33.7* 33.6* 38.7   PLATELETS 10*3/mm3 162 170 221     Results from last 7 days   Lab Units 11/01/22  0759 10/31/22  0656 10/30/22  0709 10/29/22  2039 10/29/22  1417 10/28/22  1620 10/28/22  0533   SODIUM mmol/L 133* 126* 128* 129* 125*   < > 121*   POTASSIUM mmol/L 3.8 3.4*  --   --  4.0  --  4.5 "   CHLORIDE mmol/L 99 93*  --   --  90*  --  88*   CO2 mmol/L 25.0 23.0  --   --  22.0  --  19.0*   BUN mg/dL 8 10  --   --  9  --  8   CREATININE mg/dL 0.35* 0.32*  --   --  0.47*  --  0.36*   CALCIUM mg/dL 8.6 8.2*  --   --  8.3*  --  8.0*   ALBUMIN g/dL  --  2.60*  --   --   --   --   --     < > = values in this interval not displayed.     Results from last 7 days   Lab Units 11/01/22  0759   GLUCOSE mg/dL 72       Results from last 7 days   Lab Units 10/31/22  0656   ALK PHOS U/L 71   BILIRUBIN mg/dL 0.5   ALT (SGPT) U/L 31   AST (SGOT) U/L 16                 Estimated Creatinine Clearance: 154.6 mL/min (A) (by C-G formula based on SCr of 0.35 mg/dL (L)).      Assessment       Sepsis (HCC)    E. coli UTI    Hyponatremia    Elevated liver enzymes    Hypoalbuminemia    Leukocytosis    Proteinuria    Paroxysmal atrial fibrillation (HCC)    Essential hypertension    Acute urinary retention    Current chronic use of systemic steroids    Adrenal insufficiency due to corticosteroid withdrawal (HCC)    Morbid obesity with BMI of 40.0-44.9, adult (HCC)    Moderate asthma with exacerbation      1.  Hyponatremia: Patient has been initially treated with IV normal saline and high-dose prednisone with some improvement and has been taken of the HCTZ since admission.  Patient was on prednisone previous dose of 10 mg daily.   Normal TSH level is noted. Initial chest x-ray was negative for any mass.    2.  UTI    3.  Hypertension.    Recommendations and plan:  - Hypotonic Hyponatremia:   -Discussion with Dr. Jefferson; the timing of worsening hyponatremia correlates with the time when steroids were tapered.  She discussed with endocrinology and they are okay with increasing prednisone to 20 mg daily.  Likely hyponatremia can be from related to relative adrenal insufficiency ?   - S/p 3% normal saline but was stopped even before the sodium get better.   - Improvement in hyponatremia after increasing prednisone. Sodium now 133. .   -  Last urine studies [U osmo 580, U sodium 120, Serum osm 256]  - Normal cortisol level, normal lipid pane.    - Cont fluid restriction less than 1200 mL/day.    - Blood pressure controlled. On nifedipine 30 mg daily.       Nephrology will sign off at this point. Please call back if any questions.       Olman Meehan MD  11/01/22  18:31 EDT

## 2022-11-01 NOTE — PLAN OF CARE
Goal Outcome Evaluation:  Plan of Care Reviewed With: patient        Progress: no change  Outcome Evaluation: Patient completed rolling side to side with max assist x2, increased time and effort to complete supine<>sit transfer. Attempted 3 sit<>stands from EOB, Patient having difficulty following commands for proper hand/feet placement and anterior weight shifting, unable to clear bottom off bed despite max education and cues. B LE AAROM/AROM completed in supine and seated positions. Nursing notified that patient would benefit form lift room for safe transfers.

## 2022-11-01 NOTE — PLAN OF CARE
Goal Outcome Evaluation:  Plan of Care Reviewed With: patient        Progress: no change  Outcome Evaluation: OT progress note completed this session. Pt completed all oral care with setup. Required Miladys to reach back of hair for completion of hair care. Washed face with setup. Completed AROM x10 reps shoulder, elbow, wrist, hand. Pt would benefit from lift room. Recommend d/c to SNF.

## 2022-11-02 VITALS
SYSTOLIC BLOOD PRESSURE: 130 MMHG | RESPIRATION RATE: 18 BRPM | WEIGHT: 240 LBS | OXYGEN SATURATION: 95 % | HEART RATE: 88 BPM | BODY MASS INDEX: 40.97 KG/M2 | DIASTOLIC BLOOD PRESSURE: 71 MMHG | HEIGHT: 64 IN | TEMPERATURE: 97.5 F

## 2022-11-02 PROBLEM — A41.9 SEPSIS: Status: RESOLVED | Noted: 2022-10-18 | Resolved: 2022-11-02

## 2022-11-02 LAB
ANION GAP SERPL CALCULATED.3IONS-SCNC: 13 MMOL/L (ref 5–15)
BUN SERPL-MCNC: 12 MG/DL (ref 8–23)
BUN/CREAT SERPL: 35.3 (ref 7–25)
CALCIUM SPEC-SCNC: 8.3 MG/DL (ref 8.6–10.5)
CHLORIDE SERPL-SCNC: 95 MMOL/L (ref 98–107)
CO2 SERPL-SCNC: 21 MMOL/L (ref 22–29)
CREAT SERPL-MCNC: 0.34 MG/DL (ref 0.57–1)
EGFRCR SERPLBLD CKD-EPI 2021: 104.2 ML/MIN/1.73
GLUCOSE SERPL-MCNC: 102 MG/DL (ref 65–99)
POTASSIUM SERPL-SCNC: 3.7 MMOL/L (ref 3.5–5.2)
RENIN PLAS-CCNC: <0.167 NG/ML/HR (ref 0.17–5.38)
SODIUM SERPL-SCNC: 129 MMOL/L (ref 136–145)

## 2022-11-02 PROCEDURE — 99238 HOSP IP/OBS DSCHRG MGMT 30/<: CPT | Performed by: INTERNAL MEDICINE

## 2022-11-02 PROCEDURE — 80048 BASIC METABOLIC PNL TOTAL CA: CPT | Performed by: INTERNAL MEDICINE

## 2022-11-02 PROCEDURE — 63710000001 PREDNISONE PER 1 MG: Performed by: INTERNAL MEDICINE

## 2022-11-02 RX ORDER — NIFEDIPINE 30 MG/1
30 TABLET, FILM COATED, EXTENDED RELEASE ORAL
Start: 2022-11-03 | End: 2023-03-09 | Stop reason: HOSPADM

## 2022-11-02 RX ORDER — PANTOPRAZOLE SODIUM 40 MG/1
40 TABLET, DELAYED RELEASE ORAL
Start: 2022-11-03 | End: 2023-03-09 | Stop reason: HOSPADM

## 2022-11-02 RX ORDER — ONDANSETRON 4 MG/1
4 TABLET, FILM COATED ORAL EVERY 6 HOURS PRN
Start: 2022-11-02

## 2022-11-02 RX ORDER — PREDNISONE 20 MG/1
20 TABLET ORAL DAILY
Start: 2022-11-03 | End: 2023-03-09 | Stop reason: HOSPADM

## 2022-11-02 RX ORDER — SIMETHICONE 80 MG
160 TABLET,CHEWABLE ORAL 3 TIMES DAILY PRN
Start: 2022-11-02

## 2022-11-02 RX ORDER — BISOPROLOL FUMARATE 10 MG/1
10 TABLET, FILM COATED ORAL
Start: 2022-11-03

## 2022-11-02 RX ORDER — BISACODYL 5 MG/1
10 TABLET, DELAYED RELEASE ORAL DAILY
Start: 2022-11-03

## 2022-11-02 RX ORDER — TAMSULOSIN HYDROCHLORIDE 0.4 MG/1
0.4 CAPSULE ORAL DAILY
Qty: 30 CAPSULE
Start: 2022-11-03 | End: 2023-03-01

## 2022-11-02 RX ADMIN — NIFEDIPINE 30 MG: 30 TABLET, FILM COATED, EXTENDED RELEASE ORAL at 08:40

## 2022-11-02 RX ADMIN — PREDNISONE 20 MG: 20 TABLET ORAL at 08:40

## 2022-11-02 RX ADMIN — APIXABAN 5 MG: 5 TABLET, FILM COATED ORAL at 08:40

## 2022-11-02 RX ADMIN — ATORVASTATIN CALCIUM 20 MG: 20 TABLET, FILM COATED ORAL at 08:40

## 2022-11-02 RX ADMIN — TAMSULOSIN HYDROCHLORIDE 0.4 MG: 0.4 CAPSULE ORAL at 08:40

## 2022-11-02 RX ADMIN — BISOPROLOL FUMARATE 10 MG: 5 TABLET ORAL at 08:39

## 2022-11-02 RX ADMIN — BISACODYL 10 MG: 5 TABLET, COATED ORAL at 08:40

## 2022-11-02 RX ADMIN — PANTOPRAZOLE SODIUM 40 MG: 40 TABLET, DELAYED RELEASE ORAL at 05:37

## 2022-11-02 RX ADMIN — ACETAMINOPHEN 650 MG: 325 TABLET, FILM COATED ORAL at 08:39

## 2022-11-02 RX ADMIN — Medication 10 ML: at 08:41

## 2022-11-02 NOTE — PLAN OF CARE
Goal Outcome Evaluation:   RA. A &O x4. Non telemetry. Patient c/o gas pain. See mar. Patient agreed to turn once this shift, refused all other turns. RN educated. Z-guard applied to coccyx. I/O catheterization 875 out. Bladder scan at 0530 = 371. No I/O catheterization needed at this time.

## 2022-11-02 NOTE — DISCHARGE SUMMARY
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 coli UTI [N39.0, B96.20]  Yes   • Hyponatremia [E87.1]  Yes   • Elevated liver enzymes [R74.8]  Yes   • Hypoalbuminemia [E88.09]  Yes   • Leukocytosis [D72.829]  Yes   • Proteinuria [R80.9]  Yes   • Paroxysmal atrial fibrillation (HCC) [I48.0]  Yes   • Essential hypertension [I10]  Yes      Resolved Hospital Problems    Diagnosis Date Resolved POA   • **Sepsis (HCC) [A41.9] 2022 Yes          Hospital Course:  Sol Lovelace is a 82 y.o. female with HTN, HLD, asthma, atrial fibrillation and RA (on chronic prednisone) who presented to the ED on 10/18/22 for evaluation of dyspnea and altered mental status. She was found to have hyponatremia, concern for HCTZ-induced. Admitted to ICU, treated with high dose steroids and IVF with improvement in sodium while treating asthma exacerbation/pneumonia. Transitioned to floor on 10/22 and prednisone 10 mg resumed with worsening of hyponatremia then again improved with increasing prednisone to 20mg daily given suspected component of relative adrenal insufficiency.  She should remain on 20mg daily until she follows up with her PCP and rheumatology  and then consider tapering back to 10mg daily if sodium level is stable.  She should continue 1.2L daily fluid restriction per nephrology recommendation and remain off HCTZ.  Recommend BMP within 1 week to monitor sodium level.    Hospital course was complicated by constipation, UTI, and urinary retention.  She was treated with IV ceftriaxone x 7 days.  At discharge recommend continued in and out catheterization Q6 hours as long as she is unable to void spontaneously and arrange outpatient follow up with urology in 2 weeks if urinary retention continues.  Avoid constipation.       Discharge Follow Up Recommendations for outpatient labs/diagnostics:  BMP within 1 week to monitor sodium level    F/U with PCP within 1 week of rehab discharge.  Recommend tapering prednisone if sodium level is stable.  Recommend repeat chest X-ray to ensure resolution of right perihilar opacity.    F/U urology 2 weeks if urinary retention continues    F/U patient's primary rheumatologist at [facility] within 1 month    F/U with nephrology 1 month    Day of Discharge     HPI:   Doesn't feel great today but unable to articulate further.  No abdominal discomfort or breathing issues.  Wants to go to rehab today.    Review of Systems  Gen- No fevers  Resp- No cough, dyspnea      Vital Signs:   Temp:  [97.2 °F (36.2 °C)-97.9 °F (36.6 °C)] 97.5 °F (36.4 °C)  Heart Rate:  [74-88] 88  Resp:  [16-18] 18  BP: (121-157)/(64-74) 130/71      Physical Exam:  Constitutional: No acute distress, awake, alert, sitting up in bed  HENT: NCAT, mucous membranes moist  Respiratory: Respiratory effort normal   Psychiatric: Appropriate affect, cooperative  Neurologic: Speech clear and fluent  Skin: No rashes on exposed surfaces    Pertinent  and/or Most Recent Results     LAB RESULTS:      Lab 10/31/22  0656 10/29/22  1417 10/26/22  0916   WBC 9.10 14.32* 15.14*   HEMOGLOBIN 10.9* 11.3* 13.0   HEMATOCRIT 33.7* 33.6* 38.7   PLATELETS 162 170 221   MCV 84.3  83.0 80.5         Lab 11/01/22  0759 10/31/22  0656 10/30/22  0709 10/29/22  2039 10/29/22  1417 10/28/22  1620 10/28/22  0533 10/27/22  1846 10/27/22  1308   SODIUM 133* 126* 128* 129* 125*   < > 121*   < > 120*   POTASSIUM 3.8 3.4*  --   --  4.0  --  4.5  --  4.3   CHLORIDE 99 93*  --   --  90*  --  88*  --  87*   CO2 25.0 23.0  --   --  22.0  --  19.0*  --  23.0   ANION GAP 9.0 10.0  --   --  13.0  --  14.0  --  10.0   BUN 8 10  --   --  9  --  8  --  10   CREATININE 0.35* 0.32*  --   --  0.47*  --  0.36*  --  0.37*   EGFR 103.5 105.7  --   --  96.4  --  102.8  --  102.1   GLUCOSE 72 72  --   --  171*  --  78  --  113*   CALCIUM 8.6 8.2*  --   --  8.3*  --  8.0*  --  8.2*    < > = values in this interval not displayed.         Lab 10/31/22  0656   TOTAL PROTEIN 5.0*   ALBUMIN 2.60*   GLOBULIN 2.4   ALT (SGPT) 31   AST (SGOT) 16   BILIRUBIN 0.5   ALK PHOS 71             Lab 10/26/22  0916   CHOLESTEROL 131   LDL CHOL 70   HDL CHOL 43   TRIGLYCERIDES 93             Brief Urine Lab Results  (Last result in the past 365 days)      Color   Clarity   Blood   Leuk Est   Nitrite   Protein   CREAT   Urine HCG        10/28/22 1142             34.0             Microbiology Results (last 10 days)     Procedure Component Value - Date/Time    COVID PRE-OP / PRE-PROCEDURE SCREENING ORDER (NO ISOLATION) - Swab, Nasopharynx [[id number]]  (Normal) Collected: 11/01/22 1710    Lab Status: Final result Specimen: Swab from Nasopharynx Updated: 11/01/22 8547    Narrative:      The following orders were created for panel order COVID PRE-OP / PRE-PROCEDURE SCREENING ORDER (NO ISOLATION) - Swab, Nasopharynx.  Procedure                               Abnormality         Status                     ---------                               -----------         ------                     COVID-19, ABBOTT IN-HOUS...[[id number]]  Normal              Final result                 Please view results for these tests on the individual orders.     COVID-19, ABBOTT IN-HOUSE,NASAL Swab (NO TRANSPORT MEDIA) 2 HR TAT - Swab, Nasopharynx [[id number]]  (Normal) Collected: 11/01/22 1710    Lab Status: Final result Specimen: Swab from Nasopharynx Updated: 11/01/22 1759     COVID19 Presumptive Negative    Narrative:      Fact sheet for providers: https://www.fda.gov/media/548456/download     Fact sheet for patients: https://www.fda.gov/media/595764/download    Test performed by PCR.  If inconsistent with clinical signs and symptoms patient should be tested with different authorized molecular test.          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  FINDINGS: The heart is magnified. The left lung seems clear. There is a vague density in the right perihilar area. Whether this could relate to an underlying pneumonia is uncertain. There is a dextroscoliosis of the thoracic spine. There are no large pleural effusions.      Vague density right perihilar region that may be secondary to pneumonia. Follow-up x-ray would be recommended to document resolution following medical therapy.  This report was finalized on 10/25/2022 9:33 AM by Jone Crowley MD.      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  FINDINGS: There are scattered pockets of large and  small bowel gas in a nonspecific but nonobstructive pattern. There is no mass effect. There is no abnormal calcification. There are degenerative changes of the spine as well as scoliosis convex leftward.      Nonspecific gas pattern.  This report was finalized on 10/30/2022 5:09 PM by Barrie Jacobo MD.      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  TECHNIQUE: Single radiographic view of the abdomen was obtained.  FINDINGS: Gas is seen throughout the colon which is normal in caliber to upper limits of normal, particularly at the level of the transverse colon. Air is seen in normal caliber small bowel. Gastric bubble appears distended. No pathologic intra-abdominal mass effect or calcification is seen. There is formed stool only in the rectum, which is mildly distended perhaps to 8 cm transverse diameter, but no stool shadow elsewhere to suggest significant fecal stasis. There is a moderate levoconvex lumbar scoliosis.      Mildly increased bowel gas, mostly in the colon, a nonspecific pattern. Stool shadow is visible only in the rectum, where a very mild low level impaction could be present.  This report was finalized on 10/26/2022 6:27 PM by Dr. Silas Dubose MD.                Results for orders placed during the hospital encounter of 10/18/22    Adult Transthoracic Echo Complete W/ Cont if Necessary Per Protocol    Interpretation Summary  •  Estimated left ventricular EF = 55% Left ventricular systolic function is normal.  •  Estimated right ventricular systolic pressure from tricuspid regurgitation is normal (<35 mmHg). Calculated right ventricular systolic pressure from tricuspid regurgitation is  28 mmHg.      Plan for Follow-up of Pending Labs/Results: Outpatient follow up with nephrology   Pending Labs     Order Current Status    Renin Direct Assay In process        Discharge Details        Discharge Medications      New Medications      Instructions Start Date   apixaban 5 MG tablet tablet  Commonly known as: ELIQUIS   5 mg, Oral, Every 12 Hours Scheduled      bisacodyl 5 MG EC tablet  Commonly known as: DULCOLAX   10 mg, Oral, Daily   Start Date: November 3, 2022     bisoprolol 10 MG tablet  Commonly known as: ZEBeta   10 mg, Oral, Every 24 Hours Scheduled   Start Date: November 3, 2022     NIFEdipine CC 30 MG 24 hr tablet  Commonly known as: ADALAT CC   30 mg, Oral, Every 24 Hours Scheduled   Start Date: November 3, 2022     ondansetron 4 MG tablet  Commonly known as: ZOFRAN   4 mg, Oral, Every 6 Hours PRN      pantoprazole 40 MG EC tablet  Commonly known as: PROTONIX   40 mg, Oral, Every Early Morning   Start Date: November 3, 2022     simethicone 80 MG chewable tablet  Commonly known as: MYLICON   160 mg, Oral, 3 Times Daily PRN      tamsulosin 0.4 MG capsule 24 hr capsule  Commonly known as: FLOMAX   0.4 mg, Oral, Daily   Start Date: November 3, 2022        Changes to Medications      Instructions Start Date   predniSONE 20 MG tablet  Commonly known as: DELTASONE  What changed:   · medication strength  · how much to take   20 mg, Oral, Daily   Start Date: November 3, 2022        Continue These Medications      Instructions Start Date   albuterol sulfate  (90 Base) MCG/ACT inhaler  Commonly known as: PROVENTIL HFA;VENTOLIN HFA;PROAIR HFA   2 puffs, Inhalation, Every 4 Hours PRN      atorvastatin 20 MG tablet  Commonly known as: LIPITOR   1 tablet, Oral, Daily      B-12 2500 MCG sublingual tablet   2 tablets, Sublingual, Daily      Fluticasone-Salmeterol 250-50 MCG/ACT DISKUS  Commonly known as: ADVAIR/WIXELA   1 puff, Inhalation, 2 Times Daily      vitamin b complex capsule capsule   1 capsule,  Oral, Daily         Stop These Medications    bisoprolol-hydrochlorothiazide 10-6.25 MG per tablet  Commonly known as: ZIAC     mupirocin 2 % ointment  Commonly known as: BACTROBAN     potassium chloride 10 MEQ CR tablet  Commonly known as: K-DUR,KLOR-CON            Allergies   Allergen Reactions   • Macrobid [Nitrofurantoin] Nausea And Vomiting   • Sulfa Antibiotics Unknown - Low Severity         Discharge Disposition:  Skilled Nursing Facility (DC - External)    Diet:  Hospital:  Diet Order   Procedures   • Diet Regular; Lactose Restricted   1200mL daily fluid restriction    Restrictions or Other Recommendations:  In and out catheterization Q6 hours if she is unable to void spontaneously.       CODE STATUS:    Code Status and Medical Interventions:   Ordered at: 10/18/22 2222     Medical Intervention Limits:    NO intubation (DNI)     Level Of Support Discussed With:    Next of Kin (If No Surrogate)     Code Status (Patient has no pulse and is not breathing):    No CPR (Do Not Attempt to Resuscitate)     Medical Interventions (Patient has pulse or is breathing):    Limited Support     Comments:    daughter reports patient has living will and does not wish to be resuscitated     Release to patient:    Routine Release       Future Appointments   Date Time Provider Department Center   11/2/2022  1:30 PM EMS 1 [facility]       Additional Instructions for the Follow-ups that You Need to Schedule     Discharge Follow-up with PCP   As directed       Currently Documented PCP:    Young Huynh MD    PCP Phone Number:    [phone]     Follow Up Details: Within 1 week of rehab discharge         Discharge Follow-up with Specialty: Nephrology associates; 1 Month   As directed      Specialty: Nephrology associates    Follow Up: 1 Month         Discharge Follow-up with Specified Provider: [facility]; 2 Weeks   As directed      To: [facility]    Follow Up: 2 Weeks         Discharge Follow-up with Specified  Provider: Rheumatology; 1 Month   As directed      To: Rheumatology    Follow Up: 1 Month                     Veronica Metcalf MD  11/02/22      Time Spent on Discharge:  I spent  19 minutes on this discharge activity which included: face-to-face encounter with the patient, reviewing the data in the system, coordination of the care with the nursing staff as well as consultants, documentation, and entering orders.

## 2022-11-02 NOTE — CASE MANAGEMENT/SOCIAL WORK
Case Management Discharge Note      Final Note: Patient has orders for discharge.  Spoke with patient daughter, Stephanie, via telephone to advise that d/c orders have been placed and that she would would be leaving today via ambulance at 1330.   She verbalizes understanding and agreement with plan and will be at the facility to help her get settled in.  Spoke with patient at bedside regarding discharge plan who verbalized understanding  and agreement with plan to discharge to [facility] today at 1330.  She reports that she will miss some people at the hospital but will not miss the hospital.  No new discharge needs verbalized.  Spoke with [facility] liaison who is expecting to receive patient today.  Patient plan is to discharge to [facility] today via  Ambulance scheduled for 1330.  PCS form on chart.  Nursing to call report to [phone].         Selected Continued Care - Admitted Since 10/18/2022     Destination Coordination complete.    Service Provider Selected Services Address Phone Fax Patient Preferred    [facility] [address], [facility] [address] [phone]           Durable Medical Equipment    No services have been selected for the patient.              Dialysis/Infusion    No services have been selected for the patient.              Home Medical Care    No services have been selected for the patient.              Therapy    No services have been selected for the patient.              Community Resources    No services have been selected for the patient.              Community & DME    No services have been selected for the patient.                       Final Discharge Disposition Code: 03 - skilled nursing facility (SNF)

## 2022-11-02 NOTE — PROGRESS NOTES
Enter Query Response Below      Query Response:     Bacterial pneumonia unspecified.  Electronically signed by Veronica Metcalf MD, 22, 5:16 PM EDT.          If applicable, please update the problem list.         Patient: Sol Lovelace        : 1940  Account: [id number]           Admit Date:         How to Respond to this query:       a. Click New Note     b. Answer query within the yellow box.                c. Update the Problem List, if applicable.      If you have any questions about this query contact me at: [phone].    :     Patient with asthma and rheumatoid arthritis on chronic prednisone treated for pneumonia this admission.  Treated with IV Rocephin (10/24), PO Omnicef (10/25 - 10/30) and PO Doxycycline (10/25 - 10/30).    After study, can the patient's pneumonia be further specified as;    - Gram negative pneumonia (excluding Haemophilus influenzae).  - Bacterial pneumonia unspecified.  - Other_________.  - Unable to determine.    By submitting this query, we are merely seeking further clarification of documentation to accurately reflect all conditions that you are monitoring, evaluating, treating or that extend the hospitalization or utilize additional resources of care. Please utilize your independent clinical judgment when addressing the question(s) above.     This query and your response, once completed, will be entered into the legal medical record.    Sincerely,  Tanja Celestin RN  Clinical Documentation Integrity Program

## 2022-11-04 NOTE — DISCHARGE PLACEMENT REQUEST
"THANIA Aburto, RN  Case Management  [phone]        Vianney Lovelace (82 y.o. Female)     Date of Birth   1940    Social Security Number       [place]   [address]  [facility] [address]    Home Phone   [phone]    MRN   [id number]       Catholic   None    Marital Status                               Admission Date   10/18/22    Admission Type   Emergency    Admitting Provider   Veronica Metcalf MD    Attending Provider       Department, Room/Bed   [address], S463/1       Discharge Date   11/2/2022    Discharge Disposition   Skilled Nursing Facility (DC - External)    Discharge Destination                               Attending Provider: (none)   Allergies: Macrobid [Nitrofurantoin], Sulfa Antibiotics    Isolation: None   Infection: None   Code Status: Prior    Ht: 162.6 cm (64.02\")   Wt: 109 kg (240 lb)    Admission Cmt: None   Principal Problem: Sepsis (HCC) [A41.9]                 Active Insurance as of 10/18/2022     Primary Coverage     Payor Plan Insurance Group Employer/Plan Group    MEDICARE MEDICARE A & B      Payor Plan Address Payor Plan Phone Number Payor Plan Fax Number Effective Dates    [address] [phone]  6/1/2007 - None Entered    [facility] [id number]       Subscriber Name Subscriber Birth Date Member ID       VIANNEY LOVELACE 1940 [id number]           Secondary Coverage     Payor Plan Insurance Group Employer/Plan Group    [facility] SUPP [id number]     Payor Plan Address Payor Plan Phone Number Payor Plan Fax Number Effective Dates    [address]   12/1/2016 - None Entered    [facility] [id number]       Subscriber Name Subscriber Birth Date Member ID       VIANNEY LOVELACE 1940 [id number]                 Emergency Contacts      (Rel.) Home Phone Work Phone Mobile Phone    ALETA MICHAEL (Daughter) [phone] -- [phone]    JELLY LOVELACE (Son) [phone] -- [phone]            Insurance " Information                MEDICARE/MEDICARE A & B Phone: [phone]    Subscriber: Sol Lovelace Subscriber#: [id number]    Group#: -- Precert#: --        VIPUL [employer]/VIPUL [facility] SUPP Phone: --    Subscriber: Sol Lovelace Subscriber#: [id number]    Group#: [id number] Precert#: --             History & Physical      Margaret Ruiz MD at 10/18/22 2043             ICU ADMISSION NOTE    Chief complaint AMS and SOB    Subjective      Patient is an 82 y.o. female with PMH HTN, HLD, RA on Prednisone who presents to [facility] ED on 10/18/2022 with SOB and AMS. According to patients daughter patient was diagnosed with a UTI on 10/7. AT that time her PCP prescribed Macrobid. Unfortunately after a few days of taking the medication she developed nausea and dry heaving so she called her PCP who switched her medication to Bactrim. With the side effects from the previous abx, patients daughter was concerned for patient to start another antibiotic so Bactrim was never started. Over the past several days patient has had worsening breathing problems for which daughter gave her albuterol treatments. Today when the symptoms continued and at times seemed to be worse, daughter was concerned so EMS was called and patient was brought to [facility] ED for evaluation.     VS on arrival: Temp 98.6, heart rate 90, respiratory rate 27, blood pressure 190/107, pulse ox 92%.  Significant labs include COVID and flu negative, urine osmolality 731, urine sodium 55, UA positive for ketones, blood, protein, leukoesterase, nitrates, bacteria, serum osmolality 243, white count 12.7, hemoglobin 14, platelets 209, glucose 106, BUN 10, creatinine 0.145, sodium 117, potassium 3.8, AST 48, ALT 82, alk phos 126, albumin 2.7, total bili 1.0, procalcitonin 0.30, lactate 1.6.    Chest x-ray with poor inspiration with crowding of structures due to depth of inspiration.  Scoliosis.    CT head with age-related changes of the brain without evidence of acute  intracranial abnormality.    EKG with A. Fib.    While in the ER she received a dose of 2 g of Rocephin, 1 amp of sodium bicarb.  Due to her HTN and A. Fib (with RVR at times), she was also given a dose of 5 mg IV metoprolol. With her severe hyponatremia and sepsis she has been admitted to the ICU for management.    Discussed code status with patient and daughter. Both indicate patient would not want to be resuscitated in the event of cardiac or respiratory events. DNR/DNI order placed in computer.     I spent 20 minutes of time on this.  Emy Watters, MSN, AGACNP-BC, APRN    Electronically signed by GLADIS Mcwilliams, 10/18/22, 8:43 PM EDT.    Daughter reports that she is a non-smoker.  Her last meal was yesterday.  She is minimally ambulatory with a walker at baseline but has not gotten out of bed for 5 days.  She has severe osteoarthritis in her knees and chronic back pain that caused her inability to walk.  She had an increased cough and was using her albuterol inhaler.  She does have a history of asthma but does not take a maintenance inhaler.  She also takes medication for GERD.    Review of Systems  Review of Systems   Unable to perform ROS: Mental status change        Home Medications  (Not in a hospital admission)  Albuterol HFA as needed  Lipitor 20 mg daily  B complex vitamin daily  Bisoprolol-hydrochlorothiazide 1 tablet daily  B12 2500 mcg sublingual 2 tablets daily  Advair 250-51 puff twice daily  Furosemide 40 mg daily  Potassium 10 mEq daily  Prednisone 10 mg daily  Astelin nasal spray twice daily as needed  Gabapentin 100 mg 2 capsules as needed at bedtime  Nexium 40 mg at bedtime  Zyrtec 10 mg daily    History  Past Medical History:   Diagnosis Date   • Asthma    • Chronic back pain    • GERD (gastroesophageal reflux disease)    • Hyperlipidemia    • Hypertension    • Osteoarthritis    • Rheumatoid arthritis (HCC)    Macular degeneration    Past Surgical History:   Procedure Laterality Date   •  "LAPAROSCOPIC VAGINAL HYSTERECTOMY       History reviewed. No pertinent family history.  Social History     Tobacco Use   • Smoking status: Never   Substance Use Topics   • Alcohol use: Not Currently   • Drug use: Never     (Not in a hospital admission)    Allergies:  Macrobid [nitrofurantoin] and Sulfa antibiotics  Allergy to Daypro that causes hives.  Macrobid causes nausea and she does not report a sulfa allergy to me    Objective      Vital Signs  Blood pressure (!) 192/103, pulse 120, temperature 98.6 °F (37 °C), temperature source Axillary, resp. rate (!) 29, height 162.6 cm (64\"), weight 69.9 kg (154 lb), SpO2 94 %.    Physical Exam:  General Appearance:   Morbidly obese elderly woman supine in bed, tachypneic, lethargic   Head:   Atraumatic   Eyes:          Pupils are 6 mm and equal, reactive, conjunctiva pink   Ears:     Throat:  Oral mucosa dry   Neck:  Trachea midline, no palpable thyroid   Back:      Lungs:    Respirations rate increased, shallow, upper airway wheezing, scattered expiratory rhonchi    Heart:   Irregular rhythm, S1, S2 auscultated   Abdomen:    Large abdomen, bowel sounds present, soft   Rectal:     Deferred   Extremities:    Chronic stasis changes   Pulses:  Palpable radial pulses, irregular   Skin:  Warm and dry, poor turgor   Lymph nodes:  No cervical adenopathy   Neurologic:  Very lethargic, she will arouse and tell me her name but then immediately drifts back to sleep.  She is not following commands but she does occasionally move extremities spontaneously       Results Review:   Lab Results (last 24 hours)     Procedure Component Value Units Date/Time    [place] Draw [[id number]] Collected: 10/18/22 1636    Specimen: Blood Updated: 10/18/22 2045    Narrative:      The following orders were created for panel order [place] Draw.  Procedure                               Abnormality         Status                     ---------                               -----------         ------        "              Green Top (Gel)[[id number]]                                  Final result               Lavender Top[[id number]]                                     Final result               Gold Top - SST[[id number]]                                   Final result               Gray Top[[id number]]                                         Final result               Light Blue Top[[id number]]                                   Final result                 Please view results for these tests on the individual orders.    Gray Top [[id number]] Collected: 10/18/22 1636    Specimen: Blood Updated: 10/18/22 2045     Extra Tube Hold for add-ons.     Comment: Auto resulted.       COVID PRE-OP / PRE-PROCEDURE SCREENING ORDER (NO ISOLATION) - Swab, Nasal Cavity [[id number]]  (Normal) Collected: 10/18/22 1926    Specimen: Swab from Nasal Cavity Updated: 10/18/22 2035    Narrative:      The following orders were created for panel order COVID PRE-OP / PRE-PROCEDURE SCREENING ORDER (NO ISOLATION) - Swab, Nasal Cavity.  Procedure                               Abnormality         Status                     ---------                               -----------         ------                     COVID-19 and FLU A/B PCR...[[id number]]  Normal              Final result                 Please view results for these tests on the individual orders.    COVID-19 and FLU A/B PCR - Swab, Nasopharynx [[id number]]  (Normal) Collected: 10/18/22 1926    Specimen: Swab from Nasopharynx Updated: 10/18/22 2035     COVID19 Not Detected     Influenza A PCR Not Detected     Influenza B PCR Not Detected    Narrative:      Fact sheet for providers: https://www.fda.gov/media/462117/download    Fact sheet for patients: https://www.fda.gov/media/141319/download    Test performed by PCR.    Osmolality, Urine - Urine, Clean Catch [[id number]]  (Normal) Collected: 10/18/22 1743    Specimen: Urine, Clean Catch Updated: 10/18/22 1829     Osmolality, Urine 731 mOsm/kg      Sodium, Urine, Random - Urine, Clean Catch [[id number]] Collected: 10/18/22 1743    Specimen: Urine, Clean Catch Updated: 10/18/22 1818     Sodium, Urine 55 mmol/L     Narrative:      Reference intervals for random urine have not been established.  Clinical usage is dependent upon physician's interpretation in combination with other laboratory tests.       Urinalysis, Microscopic Only - Urine, Catheter [[id number]]  (Abnormal) Collected: 10/18/22 1742    Specimen: Urine, Catheter Updated: 10/18/22 1818     RBC, UA Too Numerous to Count /HPF      WBC, UA Too Numerous to Count /HPF      Bacteria, UA 4+ /HPF      Squamous Epithelial Cells, UA       Unable to determine due to loaded field     /HPF     Renal Epithelial Cells, UA 0-2 /HPF      Hyaline Casts, UA       Unable to determine due to loaded field     /LPF     Methodology Manual Light Microscopy    Urine Culture - Urine, Urine, Catheter [[id number]] Collected: 10/18/22 1742    Specimen: Urine, Catheter Updated: 10/18/22 1818    Urinalysis With Culture If Indicated - Urine, Catheter [[id number]]  (Abnormal) Collected: 10/18/22 1742    Specimen: Urine, Catheter Updated: 10/18/22 1816     Color, UA Dark Yellow     Appearance, UA Turbid     pH, UA 7.5     Specific Gravity, UA 1.024     Glucose, UA Negative     Ketones, UA 15 mg/dL (1+)     Bilirubin, UA Negative     Blood, UA Moderate (2+)     Protein, UA >=300 mg/dL (3+)     Leuk Esterase, UA Moderate (2+)     Nitrite, UA Positive     Urobilinogen, UA 1.0 E.U./dL    Narrative:      In absence of clinical symptoms, the presence of pyuria, bacteria, and/or nitrites on the urinalysis result does not correlate with infection.    Osmolality, Serum [[id number]]  (Abnormal) Collected: 10/18/22 1636    Specimen: Blood Updated: 10/18/22 1811     Osmolality 243 mOsm/kg     Gold Top - SST [[id number]] Collected: 10/18/22 1636    Specimen: Blood Updated: 10/18/22 1745     Extra Tube Hold for add-ons.     Comment: Auto resulted.        Light Blue Top [[id number]] Collected: 10/18/22 1636    Specimen: Blood Updated: 10/18/22 1745     Extra Tube Hold for add-ons.     Comment: Auto resulted       Green Top (Gel) [[id number]] Collected: 10/18/22 1636    Specimen: Blood Updated: 10/18/22 1745     Extra Tube Hold for add-ons.     Comment: Auto resulted.       Lavender Top [[id number]] Collected: 10/18/22 1636    Specimen: Blood Updated: 10/18/22 1745     Extra Tube hold for add-on     Comment: Auto resulted       CBC & Differential [[id number]]  (Abnormal) Collected: 10/18/22 1636    Specimen: Blood Updated: 10/18/22 1730    Narrative:      The following orders were created for panel order CBC & Differential.  Procedure                               Abnormality         Status                     ---------                               -----------         ------                     CBC Auto Differential[[id number]]        Abnormal            Final result               Scan Slide[[id number]]                                       Final result                 Please view results for these tests on the individual orders.    Scan Slide [[id number]] Collected: 10/18/22 1636    Specimen: Blood Updated: 10/18/22 1730     [place] Flat Cells Slight/1+     WBC Morphology Normal     Platelet Estimate Adequate     Clumped Platelets Present     Large Platelets Slight/1+    CBC Auto Differential [[id number]]  (Abnormal) Collected: 10/18/22 1636    Specimen: Blood Updated: 10/18/22 1730     WBC 12.71 10*3/mm3      RBC 5.14 10*6/mm3      Hemoglobin 14.1 g/dL      Hematocrit 40.5 %      MCV 78.8 fL      MCH 27.4 pg      MCHC 34.8 g/dL      RDW 15.9 %      RDW-SD 45.6 fl      MPV 9.2 fL      Platelets 209 10*3/mm3      Neutrophil % 86.0 %      Lymphocyte % 7.5 %      Monocyte % 3.1 %      Eosinophil % 2.2 %      Basophil % 0.2 %      Immature Grans % 1.0 %      Neutrophils, Absolute 10.92 10*3/mm3      Lymphocytes, Absolute 0.95 10*3/mm3      Monocytes, Absolute 0.40 10*3/mm3       Eosinophils, Absolute 0.28 10*3/mm3      Basophils, Absolute 0.03 10*3/mm3      Immature Grans, Absolute 0.13 10*3/mm3      nRBC 0.0 /100 WBC     Blood Culture - Blood, Arm, Left [[id number]] Collected: 10/18/22 1655    Specimen: Blood from Arm, Left Updated: 10/18/22 1727    Comprehensive Metabolic Panel [[id number]]  (Abnormal) Collected: 10/18/22 1636    Specimen: Blood Updated: 10/18/22 1719     Glucose 106 mg/dL      BUN 10 mg/dL      Creatinine 0.45 mg/dL      Sodium 117 mmol/L      Potassium 3.8 mmol/L      Comment: Slight hemolysis detected by analyzer. Results may be affected.        Chloride 79 mmol/L      CO2 23.0 mmol/L      Calcium 8.3 mg/dL      Total Protein 6.0 g/dL      Albumin 2.70 g/dL      ALT (SGPT) 82 U/L      AST (SGOT) 48 U/L      Alkaline Phosphatase 126 U/L      Total Bilirubin 1.0 mg/dL      Globulin 3.3 gm/dL      Comment: Calculated Result        A/G Ratio 0.8 g/dL      BUN/Creatinine Ratio 22.2     Anion Gap 15.0 mmol/L      eGFR 97.4 mL/min/1.73      Comment: National Kidney Foundation and American Society of Nephrology (ASN) Task Force recommended calculation based on the Chronic Kidney Disease Epidemiology Collaboration (CKD-EPI) equation refit without adjustment for race.       Narrative:      GFR Normal >60  Chronic Kidney Disease <60  Kidney Failure <15      Procalcitonin [[id number]]  (Abnormal) Collected: 10/18/22 1636    Specimen: Blood Updated: 10/18/22 1715     Procalcitonin 0.30 ng/mL     Narrative:      As a Marker for Sepsis (Non-Neonates):    1. <0.5 ng/mL represents a low risk of severe sepsis and/or septic shock.  2. >2 ng/mL represents a high risk of severe sepsis and/or septic shock.    As a Marker for Lower Respiratory Tract Infections that require antibiotic therapy:    PCT on Admission    Antibiotic Therapy       6-12 Hrs later    >0.5                Strongly Recommended  >0.25 - <0.5        Recommended   0.1 - 0.25          Discouraged              Remeasure/reassess  "PCT  <0.1                Strongly Discouraged     Remeasure/reassess PCT    As 28 day mortality risk marker: \"Change in Procalcitonin Result\" (>80% or <=80%) if Day 0 (or Day 1) and Day 4 values are available. Refer to http://www.[facility]-pct-calculator.com    Change in PCT <=80%  A decrease of PCT levels below or equal to 80% defines a positive change in PCT test result representing a higher risk for 28-day all-cause mortality of patients diagnosed with severe sepsis for septic shock.    Change in PCT >80%  A decrease of PCT levels of more than 80% defines a negative change in PCT result representing a lower risk for 28-day all-cause mortality of patients diagnosed with severe sepsis or septic shock.       Lactic Acid, Plasma [[id number]]  (Normal) Collected: 10/18/22 1636    Specimen: Blood Updated: 10/18/22 1711     Lactate 1.6 mmol/L      Comment: Falsely depressed results may occur on samples drawn from patients receiving N-Acetylcysteine (NAC) or Metamizole.       Blood Culture - Blood, Arm, Left [[id number]] Collected: 10/18/22 1610    Specimen: Blood from Arm, Left Updated: 10/18/22 1635        Imaging Results (Last 24 Hours)     Procedure Component Value Units Date/Time    CT Head Without Contrast [[id number]] Collected: 10/18/22 1947     Updated: 10/18/22 1951    Narrative:      DATE OF EXAM: 10/18/2022 7:12 PM     PROCEDURE: CT HEAD WO CONTRAST-     INDICATIONS: acute lethargy, hyponatremia     COMPARISON: No comparisons available.     TECHNIQUE: Routine transaxial and coronal reconstruction images were  obtained through the head without the administration of contrast.  Automated exposure control and iterative reconstruction methods were  used.     The radiation dose reduction device was turned on for each scan per the  ALARA (As Low as Reasonably Achievable) protocol.     FINDINGS: Gray-white differentiation is maintained and there is no  evidence of intracranial hemorrhage, mass or mass effect. " Age-related  changes of the brain are present including volume loss and typical  periventricular sequela of chronic small vessel ischemia. There is  otherwise no evidence of intracranial hemorrhage, mass or mass effect.  The ventricles are normal in size and configuration accounting for  surrounding volume loss. The orbits are normal and the paranasal sinuses  are grossly clear.       Impression:      Age-related changes of the brain as above, otherwise without  evidence of acute intracranial abnormality.        This report was finalized on 10/18/2022 7:48 PM by Marbin Schroeder.       XR Chest 1 View [[id number]] Collected: 10/18/22 1659     Updated: 10/18/22 1703    Narrative:      DATE OF EXAM: 10/18/2022 4:39 PM     PROCEDURE: XR CHEST 1 VW-     INDICATIONS: sepsis, wheezing     COMPARISON: February 24, 2010     TECHNIQUE: Single radiographic AP view of the chest was obtained.     FINDINGS:  There is magnification of the mediastinum and heart. The depth of  inspiration is poor. There is a crowding of structures due to the depth  of inspiration. There is no phillip consolidation or definite pleural  effusions. There is a scoliosis of the thoracolumbar spine.       Impression:      1.  Poor inspiration with crowding of structures due to depth of  inspiration.  2.  Scoliosis     This report was finalized on 10/18/2022 5:00 PM by Jone Crowley MD.              PROBLEM LIST    Sepsis with urinary tract infection  Severe hyponatremia  New onset atrial fibrillation  Hypertension  Dyslipidemia  Rheumatoid arthritis on chronic prednisone  GERD      Assessment & Plan     82-year-old woman, non-smoker, debilitated with rheumatoid arthritis, osteoarthritis, hypertension, dyslipidemia, chronic back pain, presenting with a 1 week history of urinary tract infection, decreased mobility and oral intake and finally presenting to the emergency room with altered mental status.  She was found to have a sodium of 117 and a raging urinary  tract infection.  She was intolerant to Macrobid secondary to nausea.  Antibiotic was changed to Bactrim but it was not given.  She does not have fever in the emergency room.  She has not had any seizures from her hyponatremia.  Urine sodium was elevated but she takes diuretics.  She is on chronic prednisone for rheumatoid arthritis.  Patient cannot give an accurate history and daughter is a poor historian.  She is actually hypertensive in the emergency room.  She did develop atrial fibrillation which is a new rhythm.  She did receive some metoprolol for rate control.  On low-flow oxygen her saturation is 100% but she looks tachypneic.  She does report a history of asthma and does have some upper airway wheezing on examination.  Chest x-ray revealed no evidence of pneumonia or volume overload.     -For her urinary tract infection we will continue Rocephin, obtain a urine culture  -For her hyponatremia we will hold diuretics, gentle hydration, monitor for complications such as seizures  -For her atrial fibrillation, beta-blocker or Cardizem for rate control, check cardiac enzymes and an echocardiogram  -For her asthma, steroids, bronchodilators  -For her GERD continue proton pump inhibitor  -Once she is more alert can have PT and OT evaluate for mobility  -Her daughter reports that she has made a living well and does not wish CPR or mechanical ventilation      I personally interviewed her daughter, reviewed the history taken from the nurse practitioner, performed my own history, performed a physical examination, reviewed her lab and x-ray data, dictated the assessment and plan.  Time documented is my personal time not the APRN time.  She is critically ill with severe hyponatremia, sepsis, new onset atrial fibrillation, tachypnea and needs ongoing ICU monitoring and care    Margaret Ruiz MD  10/18/22  21:45 EDT    Time: Critical care 55 min    Please note that portions of this note were completed with voice  recognition program.    Electronically signed by GLADIS Mcwilliams, 10/18/22, 8:43 PM EDT.          Electronically signed by Margaret Ruiz MD at 10/18/22 3147         No current facility-administered medications for this encounter.     Current Outpatient Medications   Medication Sig Dispense Refill   • albuterol sulfate  (90 Base) MCG/ACT inhaler Inhale 2 puffs Every 4 (Four) Hours As Needed.     • apixaban (ELIQUIS) 5 MG tablet tablet Take 1 tablet by mouth Every 12 (Twelve) Hours. Indications: Atrial Fibrillation 60 tablet    • atorvastatin (LIPITOR) 20 MG tablet Take 1 tablet by mouth Daily.     • B Complex Vitamins (vitamin b complex) capsule capsule Take 1 capsule by mouth Daily.     • bisacodyl (DULCOLAX) 5 MG EC tablet Take 2 tablets by mouth Daily.     • bisoprolol (ZEBeta) 10 MG tablet Take 1 tablet by mouth Daily.     • Cyanocobalamin (B-12) 2500 MCG sublingual tablet Place 2 tablets under the tongue Daily.     • Fluticasone-Salmeterol (ADVAIR/WIXELA) 250-50 MCG/ACT DISKUS Inhale 1 puff 2 (Two) Times a Day.     • NIFEdipine XL (ADALAT CC) 30 MG 24 hr tablet Take 1 tablet by mouth Daily.     • ondansetron (ZOFRAN) 4 MG tablet Take 1 tablet by mouth Every 6 (Six) Hours As Needed for Nausea or Vomiting.     • pantoprazole (PROTONIX) 40 MG EC tablet Take 1 tablet by mouth Every Morning.     • predniSONE (DELTASONE) 20 MG tablet Take 1 tablet by mouth Daily.     • simethicone (MYLICON) 80 MG chewable tablet Chew 2 tablets 3 (Three) Times a Day As Needed for Flatulence.     • tamsulosin (FLOMAX) 0.4 MG capsule 24 hr capsule Take 1 capsule by mouth Daily. 30 capsule         Physician Progress Notes (most recent note)      Veronica Metcalf MD at 11/02/22 7465          Enter Query Response Below      Query Response:     Bacterial pneumonia unspecified.  Electronically signed by Veronica Metcalf MD, 11/02/22, 5:16 PM EDT.          If applicable, please update the problem list.         Patient: [place]  Sol HARDEN        : 1940  Account: [id number]           Admit Date:         How to Respond to this query:       a. Click New Note     b. Answer query within the yellow box.                c. Update the Problem List, if applicable.      If you have any questions about this query contact me at: [phone].    :     Patient with asthma and rheumatoid arthritis on chronic prednisone treated for pneumonia this admission.  Treated with IV Rocephin (10/24), PO Omnicef (10/25 - 10/30) and PO Doxycycline (10/25 - 10/30).    After study, can the patient's pneumonia be further specified as;    - Gram negative pneumonia (excluding Haemophilus influenzae).  - Bacterial pneumonia unspecified.  - Other_________.  - Unable to determine.    By submitting this query, we are merely seeking further clarification of documentation to accurately reflect all conditions that you are monitoring, evaluating, treating or that extend the hospitalization or utilize additional resources of care. Please utilize your independent clinical judgment when addressing the question(s) above.     This query and your response, once completed, will be entered into the legal medical record.    Sincerely,  Tanja Celestin RN  Clinical Documentation Integrity Program       Electronically signed by Veronica Metcalf MD at 22 1716          Physical Therapy Notes (most recent note)      Marlen Kiran PTA at 22 0953  Version 1 of 1         Patient Name: Sol Lovelace  : 1940    MRN: [id number]                              Today's Date: 2022       Admit Date: 10/18/2022    Visit Dx:     ICD-10-CM ICD-9-CM   1. Hyponatremia  E87.1 276.1   2. Sepsis, due to unspecified organism, unspecified whether acute organ dysfunction present ([facility])  A41.9 038.9     995.91   3. Acute encephalopathy  G93.40 348.30   4. SIADH (syndrome of inappropriate ADH production) ([facility])  E22.2 253.6     Patient Active Problem List   Diagnosis   • E.  coli UTI   • Hyponatremia   • Elevated liver enzymes   • Hypoalbuminemia   • Leukocytosis   • Proteinuria   • Sepsis (HCC)   • Paroxysmal atrial fibrillation (HCC)   • Essential hypertension   • Acute urinary retention   • Current chronic use of systemic steroids   • Adrenal insufficiency due to corticosteroid withdrawal (HCC)   • Morbid obesity with BMI of 40.0-44.9, adult ([facility])   • Moderate asthma with exacerbation     Past Medical History:   Diagnosis Date   • Asthma    • Chronic back pain    • GERD (gastroesophageal reflux disease)    • Hyperlipidemia    • Hypertension    • Osteoarthritis    • Rheumatoid arthritis ([facility])      Past Surgical History:   Procedure Laterality Date   • LAPAROSCOPIC VAGINAL HYSTERECTOMY      BSO, bladder suspension      General Information     Row Name 11/01/22 1018          Physical Therapy Time and Intention    Document Type therapy note (daily note)  -AS     Mode of Treatment physical therapy  -AS     Row Name 11/01/22 1018          General Information    Patient Profile Reviewed yes  -AS     Existing Precautions/Restrictions fall;oxygen therapy device and L/min;other (see comments)  gerber  -AS     Barriers to Rehab medically complex;previous functional deficit;physical barrier  -AS     Row Name 11/01/22 1018          Cognition    Orientation Status (Cognition) oriented x 3  -AS     Row Name 11/01/22 1018          Safety Issues, Functional Mobility    Safety Issues Affecting Function (Mobility) awareness of need for assistance;ability to follow commands;insight into deficits/self-awareness;judgment;positioning of assistive device;safety precaution awareness;safety precautions follow-through/compliance;sequencing abilities  -AS     Impairments Affecting Function (Mobility) balance;coordination;endurance/activity tolerance;motor planning;pain;postural/trunk control;shortness of breath;strength  -AS     Comment, Safety Issues/Impairments (Mobility) alert, diffiuclty following commands  for sit<>stand transfer limiting progress with mobility, fear of falling  -AS           User Key  (r) = Recorded By, (t) = Taken By, (c) = Cosigned By    Initials Name Provider Type    AS Marlen Kiran PTA Physical Therapist Assistant               Mobility     Row Name 11/01/22 1019          Bed Mobility    Rolling Left [place] (Bed Mobility) verbal cues;maximum assist (25% patient effort);2 person assist  -AS     Rolling Right [place] (Bed Mobility) verbal cues;maximum assist (25% patient effort);1 person to manage equipment  -AS     Supine-Sit [place] (Bed Mobility) verbal cues;maximum assist (25% patient effort);2 person assist  -AS     Sit-Supine [place] (Bed Mobility) dependent (less than 25% patient effort);2 person assist  -AS     Assistive Device (Bed Mobility) bed rails;draw sheet;head of bed elevated  -AS     Comment, (Bed Mobility) patient needs increased verbal cues and time to complete tasks, rolled several times side to side to replace linen/drawsheet. Cues for sequencing to complete supine<>sit transfer, limited participation from patient with all tasks.  -AS     Row Name 11/01/22 1019          Transfers    Comment, (Transfers) attempted 3 sit<>stands from EOB, Patient having difficulty following commands for proper hand/feet placement and anterior weight shifting, unable to clear bottom off bed despite max education and cues.  -AS     Row Name 11/01/22 1019          Bed-Chair Transfer    Bed-Chair [place] (Transfers) not tested;unable to assess  patient not in lift room, nursing notified and aware  -AS     Row Name 11/01/22 1019          Sit-Stand Transfer    Sit-Stand [place] (Transfers) verbal cues;maximum assist (25% patient effort);2 person assist  -AS     Assistive Device (Sit-Stand Transfers) walker, front-wheeled  -AS     Comment, (Sit-Stand Transfer) 1 attempt with B UE support  -AS     Row Name 11/01/22 1019          Gait/Stairs (Locomotion)     [place] Level (Gait) unable to assess  -AS           User Key  (r) = Recorded By, (t) = Taken By, (c) = Cosigned By    Initials Name Provider Type    AS Marlen Kiran PTA Physical Therapist Assistant               Obj/Interventions     Row Name 11/01/22 1023          Motor Skills    Therapeutic Exercise knee;ankle;hip  -AS     Row Name 11/01/22 1023          Hip (Therapeutic Exercise)    Hip (Therapeutic Exercise) AAROM (active assistive range of motion)  -AS     Hip Strengthening (Therapeutic Exercise) bilateral;aBduction;aDduction;external rotation;internal rotation;supine;10 repetitions  -AS     Row Name 11/01/22 1023          Knee (Therapeutic Exercise)    Knee (Therapeutic Exercise) AROM (active range of motion)  -AS     Knee Strengthening (Therapeutic Exercise) bilateral;heel slides;supine;marching while seated;LAQ (long arc quad);sitting;10 repetitions  -AS     Row Name 11/01/22 1023          Ankle (Therapeutic Exercise)    Ankle (Therapeutic Exercise) AROM (active range of motion)  -AS     Ankle AROM (Therapeutic Exercise) bilateral;dorsiflexion;plantarflexion;sitting;10 repetitions  -AS     Row Name 11/01/22 1023          Balance    Static Standing Balance verbal cues;maximum assist;2-person assist  -AS     Position/Device Used, Standing Balance supported;walker, front-wheeled  -AS           User Key  (r) = Recorded By, (t) = Taken By, (c) = Cosigned By    Initials Name Provider Type    AS Marlen Kiran PTA Physical Therapist Assistant               Goals/Plan    No documentation.                Clinical Impression     Row Name 11/01/22 1024          Pain    Pretreatment Pain Rating 4/10  -AS     Posttreatment Pain Rating 4/10  -AS     Pain Location generalized  -AS     Pain Intervention(s) Repositioned;Ambulation/increased activity  -AS     Row Name 11/01/22 1024          Plan of Care Review    Plan of Care Reviewed With patient  -AS     Progress no change  -AS     Outcome Evaluation  Patient completed rolling side to side with max assist x2, increased time and effort to complete supine<>sit transfer. Attempted 3 sit<>stands from EOB, Patient having difficulty following commands for proper hand/feet placement and anterior weight shifting, unable to clear bottom off bed despite max education and cues. B LE AAROM/AROM completed in supine and seated positions. Nursing notified that patient would benefit form lift room for safe transfers.  -AS     Row Name 11/01/22 1024          Positioning and Restraints    Pre-Treatment Position in bed  -AS     Post Treatment Position bed  -AS     In Bed supine;call light within reach;encouraged to call for assist;exit alarm on;side rails up x3;waffle boots/both  -AS           User Key  (r) = Recorded By, (t) = Taken By, (c) = Cosigned By    Initials Name Provider Type    AS Marlen Kiran PTA Physical Therapist Assistant               Outcome Measures     Row Name 11/01/22 1026          How much help from another person do you currently need...    Turning from your back to your side while in flat bed without using bedrails? 2  -AS     Moving from lying on back to sitting on the side of a flat bed without bedrails? 2  -AS     Moving to and from a bed to a chair (including a wheelchair)? 2  -AS     Standing up from a chair using your arms (e.g., wheelchair, bedside chair)? 2  -AS     Climbing 3-5 steps with a railing? 1  -AS     To walk in hospital room? 1  -AS     AM-PAC 6 Clicks Score (PT) 10  -AS     Highest level of mobility 4 --> Transferred to chair/commode  -AS     Row Name 11/01/22 1026          Functional Assessment    Outcome Measure Options AM-PAC 6 Clicks Basic Mobility (PT)  -AS           User Key  (r) = Recorded By, (t) = Taken By, (c) = Cosigned By    Initials Name Provider Type    AS Marlen Kiran PTA Physical Therapist Assistant                             Physical Therapy Education     Title: PT OT SLP Therapies (In Progress)      Topic: Physical Therapy (In Progress)     Point: Mobility training (In Progress)     Learning Progress Summary           Patient Acceptance, E, NR by AS at 11/1/2022 1026    Acceptance, E, NR by FW at 10/31/2022 0927    Acceptance, E, NR by  at 10/28/2022 1016    Acceptance, E, NR by  at 10/25/2022 0921    Acceptance, E,TB, VU,NR by  at 10/20/2022 1254                   Point: Home exercise program (In Progress)     Learning Progress Summary           Patient Acceptance, E, NR by AS at 11/1/2022 1026    Acceptance, E, NR by FW at 10/31/2022 0927    Acceptance, E, NR by BA at 10/28/2022 1016    Acceptance, E, NR by  at 10/25/2022 0921                   Point: Body mechanics (In Progress)     Learning Progress Summary           Patient Acceptance, E, NR by AS at 11/1/2022 1026    Acceptance, E, NR by FW at 10/31/2022 0927    Acceptance, E, NR by  at 10/28/2022 1016    Acceptance, E, NR by  at 10/25/2022 0921    Acceptance, E,TB, VU,NR by  at 10/20/2022 1254                   Point: Precautions (In Progress)     Learning Progress Summary           Patient Acceptance, E, NR by AS at 11/1/2022 1026    Acceptance, E, NR by FW at 10/31/2022 0927    Acceptance, E, NR by  at 10/28/2022 1016    Acceptance, E, NR by  at 10/25/2022 0921    Acceptance, E,TB, VU,NR by  at 10/20/2022 1254                               User Key     Initials Effective Dates Name Provider Type Discipline    AS 06/16/21 -  Marlen Kiran, PTA Physical Therapist Assistant PT    FW 05/05/22 -  Jose Raul Perry, PT Physical Therapist PT     09/21/21 -  Bridgette Villanueva, PT Physical Therapist PT     09/22/22 -  Veronica Harrison, PT Physical Therapist PT              PT Recommendation and Plan     Plan of Care Reviewed With: patient  Progress: no change  Outcome Evaluation: Patient completed rolling side to side with max assist x2, increased time and effort to complete supine<>sit transfer. Attempted 3 sit<>stands from EOB,  Patient having difficulty following commands for proper hand/feet placement and anterior weight shifting, unable to clear bottom off bed despite max education and cues. B LE AAROM/AROM completed in supine and seated positions. Nursing notified that patient would benefit form lift room for safe transfers.     Time Calculation:    PT Charges     Row Name 22 1026             Time Calculation    Start Time 0953  -AS      PT Received On 22  -AS      PT Goal Re-Cert Due Date 11/10/22  -AS         Timed Charges    30956 - PT Therapeutic Exercise Minutes 15  -AS      07714 - PT Therapeutic Activity Minutes 9  -AS         Total Minutes    Timed Charges Total Minutes 24  -AS       Total Minutes 24  -AS            User Key  (r) = Recorded By, (t) = Taken By, (c) = Cosigned By    Initials Name Provider Type    AS Marlen Kiran PTA Physical Therapist Assistant              Therapy Charges for Today     Code Description Service Date Service Provider Modifiers Qty    22878178245 HC PT THER PROC EA 15 MIN 2022 Marlen Kiran PTA GP 1    16547174050 HC PT THERAPEUTIC ACT EA 15 MIN 2022 Marlen Kiran PTA GP 1    91592561149 HC PT THER SUPP EA 15 MIN 2022 Marlen Kiran PTA GP 2          PT G-Codes  Outcome Measure Options: AM-PAC 6 Clicks Basic Mobility (PT)  AM-PAC 6 Clicks Score (PT): 10  AM-PAC 6 Clicks Score (OT): 12    Marlen Kiran PTA  2022      Electronically signed by Marlen Kiran PTA at 22 1027          Occupational Therapy Notes (most recent note)      Janeth Oneil, OT at 22 1014          Patient Name: Sol Lovelace  : 1940    MRN: [id number]                              Today's Date: 2022       Admit Date: 10/18/2022    Visit Dx:     ICD-10-CM ICD-9-CM   1. Hyponatremia  E87.1 276.1   2. Sepsis, due to unspecified organism, unspecified whether acute organ dysfunction present (HCC)  A41.9 038.9     995.91   3. Acute  encephalopathy  G93.40 348.30   4. SIADH (syndrome of inappropriate ADH production) ([facility])  E22.2 253.6     Patient Active Problem List   Diagnosis   • E. coli UTI   • Hyponatremia   • Elevated liver enzymes   • Hypoalbuminemia   • Leukocytosis   • Proteinuria   • Sepsis ([facility])   • Paroxysmal atrial fibrillation ([facility])   • Essential hypertension   • Acute urinary retention   • Current chronic use of systemic steroids   • Adrenal insufficiency due to corticosteroid withdrawal ([facility])   • Morbid obesity with BMI of 40.0-44.9, adult ([facility])   • Moderate asthma with exacerbation     Past Medical History:   Diagnosis Date   • Asthma    • Chronic back pain    • GERD (gastroesophageal reflux disease)    • Hyperlipidemia    • Hypertension    • Osteoarthritis    • Rheumatoid arthritis ([facility])      Past Surgical History:   Procedure Laterality Date   • LAPAROSCOPIC VAGINAL HYSTERECTOMY      BSO, bladder suspension      General Information     Row Name 11/01/22 1029          OT Time and Intention    Document Type progress note/recertification  -     Mode of Treatment occupational therapy  -     Row Name 11/01/22 1029          General Information    Patient Profile Reviewed yes  -     Existing Precautions/Restrictions fall;oxygen therapy device and L/min;other (see comments)  gerber  -     Barriers to Rehab medically complex;previous functional deficit  -     Row Name 11/01/22 1029          Cognition    Orientation Status (Cognition) oriented x 3  -     Row Name 11/01/22 1029          Safety Issues, Functional Mobility    Safety Issues Affecting Function (Mobility) safety precautions follow-through/compliance;safety precaution awareness;insight into deficits/self-awareness;ability to follow commands;awareness of need for assistance;judgment;problem-solving;sequencing abilities  -     Impairments Affecting Function (Mobility) balance;coordination;endurance/activity tolerance;motor planning;pain;postural/trunk  control;shortness of breath;strength  -           User Key  (r) = Recorded By, (t) = Taken By, (c) = Cosigned By    Initials Name Provider Type     Janeth Oneil OT Occupational Therapist                 Mobility/ADL's     Row Name 11/01/22 1030          Activities of Daily Living    BADL Assessment/Intervention grooming  -     Row Name 11/01/22 1030          Grooming Assessment/Training    [place] Level (Grooming) hair care, combing/brushing;minimum assist (75% patient effort)  -     Position (Grooming) sitting up in bed  -     Comment, (Grooming) Pt had already completed oral care with setup from RN on OT entry. Required Miladys to reach back of hair and washed face with setup. Extra time/effort required.  -           User Key  (r) = Recorded By, (t) = Taken By, (c) = Cosigned By    Initials Name Provider Type     Janeth Oneil OT Occupational Therapist               Obj/Interventions     Row Name 11/01/22 1031          Shoulder (Therapeutic Exercise)    Shoulder (Therapeutic Exercise) AROM (active range of motion)  -     Shoulder AROM (Therapeutic Exercise) bilateral;flexion;extension;aBduction;aDduction;supine;10 repetitions  -     Row Name 11/01/22 1031          Elbow/Forearm (Therapeutic Exercise)    Elbow/Forearm (Therapeutic Exercise) AROM (active range of motion)  -     Elbow/Forearm AROM (Therapeutic Exercise) bilateral;flexion;extension;supination;pronation;supine;10 repetitions  -     Row Name 11/01/22 1031          Wrist (Therapeutic Exercise)    Wrist AROM (Therapeutic Exercise) bilateral;flexion;extension;10 repetitions  -     Row Name 11/01/22 1031          Hand (Therapeutic Exercise)    Hand (Therapeutic Exercise) AROM (active range of motion)  -     Hand AROM/AAROM (Therapeutic Exercise) bilateral;AROM (active range of motion);finger flexion;finger extension;10 repetitions  -     Row Name 11/01/22 1031          Motor Skills    Therapeutic Exercise  shoulder;elbow/forearm;wrist;hand  -HM           User Key  (r) = Recorded By, (t) = Taken By, (c) = Cosigned By    Initials Name Provider Type     Janeth Oneil OT Occupational Therapist               Goals/Plan     Row Name 11/01/22 1034          Bed Mobility Goal 1 (OT)    Activity/Assistive Device (Bed Mobility Goal 1, OT) rolling to left;rolling to right;sit to supine;supine to sit  -     [place] Level/Cues Needed (Bed Mobility Goal 1, OT) moderate assist (50-74% patient effort);verbal cues required  -HM     Time Frame (Bed Mobility Goal 1, OT) long term goal (LTG);10 days  -HM     Progress/Outcomes (Bed Mobility Goal 1, OT) progress slower than expected;goal ongoing  -     Row Name 11/01/22 1034          Transfer Goal 1 (OT)    Activity/Assistive Device (Transfer Goal 1, OT) sit-to-stand/stand-to-sit;bed-to-chair/chair-to-bed;commode, bedside without drop arms;walker, rolling  -HM     [place] Level/Cues Needed (Transfer Goal 1, OT) moderate assist (50-74% patient effort);verbal cues required  -HM     Time Frame (Transfer Goal 1, OT) long term goal (LTG);10 days  -HM     Progress/Outcome (Transfer Goal 1, OT) progress slower than expected;goal ongoing  -     Row Name 11/01/22 1034          Dressing Goal 1 (OT)    Activity/Device (Dressing Goal 1, OT) lower body dressing  -HM     [place]/Cues Needed (Dressing Goal 1, OT) moderate assist (50-74% patient effort);verbal cues required  -HM     Time Frame (Dressing Goal 1, OT) long term goal (LTG);10 days  -HM     Progress/Outcome (Dressing Goal 1, OT) goal no longer appropriate  -     Row Name 11/01/22 1034          Grooming Goal 1 (OT)    Activity/Device (Grooming Goal 1, OT) hair care;oral care;wash face, hands  -HM     [place] (Grooming Goal 1, OT) supervision required  -HM     Time Frame (Grooming Goal 1, OT) long term goal (LTG);10 days  -HM     Progress/Outcome (Grooming Goal 1, OT) goal revised this date  -HM           User  Key  (r) = Recorded By, (t) = Taken By, (c) = Cosigned By    Initials Name Provider Type     Janeth Oneil, OT Occupational Therapist               Clinical Impression     Row Name 11/01/22 1033          Pain Assessment    Pretreatment Pain Rating 4/10  -     Posttreatment Pain Rating 4/10  -     Pain Location generalized  -     Pain Location - abdomen  -     Pain Intervention(s) Ambulation/increased activity;Repositioned  -     Row Name 11/01/22 1033          Plan of Care Review    Plan of Care Reviewed With patient  -     Progress no change  -     Outcome Evaluation OT progress note completed this session. Pt completed all oral care with setup. Required [place] to reach back of hair for completion of hair care. Washed face with setup. Completed AROM x10 reps shoulder, elbow, wrist, hand. Pt would benefit from lift room. Recommend d/c to SNF.  -     Row Name 11/01/22 1033          Therapy Assessment/Plan (OT)    Patient/Family Therapy Goal Statement (OT) Pt would like to improve and return home.  -     Rehab Potential (OT) good, to achieve stated therapy goals  -     Criteria for Skilled Therapeutic Interventions Met (OT) yes;skilled treatment is necessary  -     Therapy Frequency (OT) daily  -     Row Name 11/01/22 1033          Therapy Plan Review/Discharge Plan (OT)    Anticipated Discharge Disposition (OT) skilled nursing facility  -     Row Name 11/01/22 1033          Vital Signs    Pre Systolic BP Rehab --  RN cleared for tx; VSS  -     O2 Delivery Pre Treatment room air  -     O2 Delivery Intra Treatment room air  -     O2 Delivery Post Treatment room air  -     Pre Patient Position Supine  -     Intra Patient Position Supine  -     Post Patient Position Supine  -     Row Name 11/01/22 1033          Positioning and Restraints    Pre-Treatment Position in bed  -     Post Treatment Position bed  -HM     In Bed notified nsg;supine;call light within reach;encouraged  to call for assist;exit alarm on  -           User Key  (r) = Recorded By, (t) = Taken By, (c) = Cosigned By    Initials Name Provider Type     Janeth Oneil OT Occupational Therapist               Outcome Measures     Row Name 11/01/22 1035          How much help from another is currently needed...    Putting on and taking off regular lower body clothing? 1  -HM     Bathing (including washing, rinsing, and drying) 2  -HM     Toileting (which includes using toilet bed pan or urinal) 1  -HM     Putting on and taking off regular upper body clothing 2  -HM     Taking care of personal grooming (such as brushing teeth) 3  -HM     Eating meals 3  -     AM-PAC 6 Clicks Score (OT) 12  -     Row Name 11/01/22 1026          How much help from another person do you currently need...    Turning from your back to your side while in flat bed without using bedrails? 2  -AS     Moving from lying on back to sitting on the side of a flat bed without bedrails? 2  -AS     Moving to and from a bed to a chair (including a wheelchair)? 2  -AS     Standing up from a chair using your arms (e.g., wheelchair, bedside chair)? 2  -AS     Climbing 3-5 steps with a railing? 1  -AS     To walk in hospital room? 1  -AS     AM-PAC 6 Clicks Score (PT) 10  -AS     Highest level of mobility 4 --> Transferred to chair/commode  -AS     Row Name 11/01/22 1035 11/01/22 1026       Functional Assessment    Outcome Measure Options AM-PAC 6 Clicks Daily Activity (OT)  - AM-PAC 6 Clicks Basic Mobility (PT)  -AS          User Key  (r) = Recorded By, (t) = Taken By, (c) = Cosigned By    Initials Name Provider Type    AS Marlen Kiran PTA Physical Therapist Assistant     Janeth Oneil OT Occupational Therapist                Occupational Therapy Education     Title: PT OT SLP Therapies (In Progress)     Topic: Occupational Therapy (Done)     Point: ADL training (Done)     Description:   Instruct learner(s) on proper safety adaptation  and remediation techniques during self care or transfers.   Instruct in proper use of assistive devices.              Learning Progress Summary           Patient Acceptance, E,TB,D, VU,NR by  at 11/1/2022 1036    Acceptance, E, VU by  at 10/27/2022 1517    Acceptance, E,D, NR by  at 10/25/2022 0922    Acceptance, E,TB, VU,NR by [facility] at 10/20/2022 1209    Acceptance, E, NR by  at 10/20/2022 0922                   Point: Home exercise program (Done)     Description:   Instruct learner(s) on appropriate technique for monitoring, assisting and/or progressing therapeutic exercises/activities.              Learning Progress Summary           Patient Acceptance, E,TB,D, VU,NR by  at 11/1/2022 1036    Acceptance, E, VU by  at 10/27/2022 1517    Acceptance, E,D, NR by  at 10/25/2022 0922                   Point: Precautions (Done)     Description:   Instruct learner(s) on prescribed precautions during self-care and functional transfers.              Learning Progress Summary           Patient Acceptance, E,TB,D, VU,NR by  at 11/1/2022 1036    Acceptance, E,D, NR by  at 10/25/2022 0922    Acceptance, E,TB, VU,NR by [facility] at 10/20/2022 1209    Acceptance, E, NR by  at 10/20/2022 0922                   Point: Body mechanics (Done)     Description:   Instruct learner(s) on proper positioning and spine alignment during self-care, functional mobility activities and/or exercises.              Learning Progress Summary           Patient Acceptance, E,TB,D, VU,NR by  at 11/1/2022 1036    Acceptance, E,D, NR by  at 10/25/2022 0922    Acceptance, E,TB, VU,NR by [facility] at 10/20/2022 1209    Acceptance, E, NR by  at 10/20/2022 0922                               User Key     Initials Effective Dates Name Provider Type Discipline     10/25/22 -  Janeth Oneil OT Occupational Therapist OT    LC 06/16/21 -  Lily De La Cruz OT Occupational Therapist OT     06/16/21 -  Nataly Brock, OT Occupational Therapist OT      10/14/22 -  Natalie Mack OT Occupational Therapist OT    1 09/22/22 -  Veronica Harrison, ELVIS Physical Therapist PT              OT Recommendation and Plan  Therapy Frequency (OT): daily  Plan of Care Review  Plan of Care Reviewed With: patient  Progress: no change  Outcome Evaluation: OT progress note completed this session. Pt completed all oral care with setup. Required Miladys to reach back of hair for completion of hair care. Washed face with setup. Completed AROM x10 reps shoulder, elbow, wrist, hand. Pt would benefit from lift room. Recommend d/c to SNF.     Time Calculation:    Time Calculation- OT     Row Name 11/01/22 1014             Time Calculation- OT    OT Received On 11/01/22  -      OT Goal Re-Cert Due Date 11/11/22  -         Timed Charges    93150 - OT Self Care/Mgmt Minutes 13  -HM         Total Minutes    Timed Charges Total Minutes 13  -HM       Total Minutes 13  -HM            User Key  (r) = Recorded By, (t) = Taken By, (c) = Cosigned By    Initials Name Provider Type     Janeth Oneil OT Occupational Therapist              Therapy Charges for Today     Code Description Service Date Service Provider Modifiers Qty    05308232215 HC OT SELF CARE/MGMT/TRAIN EA 15 MIN 11/1/2022 Janeth Oneil OT GO 1               Janeth Oneil OT  11/1/2022    Electronically signed by Janeth Oneil OT at 11/01/22 1038

## 2022-11-08 ENCOUNTER — OFFICE VISIT (OUTPATIENT)
Dept: CARDIOLOGY | Facility: HOSPITAL | Age: 82
End: 2022-11-08

## 2022-11-08 VITALS
BODY MASS INDEX: 37.97 KG/M2 | HEIGHT: 64 IN | OXYGEN SATURATION: 93 % | SYSTOLIC BLOOD PRESSURE: 109 MMHG | WEIGHT: 222.4 LBS | DIASTOLIC BLOOD PRESSURE: 73 MMHG | HEART RATE: 74 BPM | TEMPERATURE: 97.3 F

## 2022-11-08 DIAGNOSIS — R60.0 BILATERAL LOWER EXTREMITY EDEMA: ICD-10-CM

## 2022-11-08 DIAGNOSIS — I48.0 PAROXYSMAL ATRIAL FIBRILLATION: Primary | ICD-10-CM

## 2022-11-08 DIAGNOSIS — I10 ESSENTIAL HYPERTENSION: ICD-10-CM

## 2022-11-08 PROCEDURE — 99443 PR PHYS/QHP TELEPHONE EVALUATION 21-30 MIN: CPT | Performed by: NURSE PRACTITIONER

## 2022-11-08 NOTE — PROGRESS NOTES
"[facility]  Heart and Valve Center    Chief Complaint  Establish Care and Atrial Fibrillation    Subjective    History of Present Illness {CC  Problem List  Visit  Diagnosis   Encounters  Notes  Medications  Labs  Result Review Imaging  Media :23}     Sol Lovelace is a 82 y.o. female with hypertension, hyperlipidemia, asthma, atrial fibrillation and rheumatoid arthritis who presents today as a hospital telemedicine referral for atrial fibrillation.    Patient admitted 10/18 for evaluation of dyspnea and altered mental status.  She was found to have hyponatremia, concern for hydrochlorothiazide induced.  She was admitted to the ICU and treated with high-dose steroids and IV fluids with improvement in sodium.  She was also treated for asthma exacerbation/pneumonia. Echo showed normal LVEF with no significant structural abnormalities. There is mention of chronic afib, but according tot pt this is a new diagnosis.   She was rate controlled and started on Eliquis.     Spoke to daughter on the phone and she reports the afib was a new diagnosis. Denies previous cardiac history. It is unclear if afib is persistent or paroxysmal. She is asymptomatic. Daughter reports chronic BLE edema and recently was on lasix as well.  On chronic prednisone for RA      Objective     Vital Signs:   Vitals:    11/08/22 1507   BP: 109/73   Pulse: 74   Temp: 97.3 °F (36.3 °C)   SpO2: 93%   Weight: 101 kg (222 lb 6.4 oz)   Height: 162.6 cm (64\")     Body mass index is 38.17 kg/m².  Physical Exam  Vitals reviewed.   Neurological:      Mental Status: She is alert and oriented to person, place, and time.   Psychiatric:         Mood and Affect: Mood normal.              Result Review  Data Reviewed:{ Labs  Result Review  Imaging  Med Tab  Media :23}   Adult Transthoracic Echo Complete W/ Cont if Necessary Per Protocol (10/19/2022 08:48)  Basic Metabolic Panel (11/02/2022 10:17)  Hospital notes reviewed  Labs " 11/3/2022 WBC 10.5, hemoglobin 11.9, hematocrit 36, platelets 236.  Glucose 84, BUN 12, creatinine 0.6, sodium 130, potassium 3.5, magnesium 2           Assessment and Plan {CC Problem List  Visit Diagnosis  ROS  Review (Popup)  Health Maintenance  Quality  BestPractice  Medications  SmartSets  SnapShot Encounters  Media :23}   1. Paroxysmal atrial fibrillation (HCC)  Rate controlled.  It is unclear if A. fib is persistent or paroxysmal.  She is asymptomatic.  CHADS-VASc Risk Assessment            4 Total Score    1 Hypertension    2 Age >/= 75    1 Sex: Female        Criteria that do not apply:    CHF    DM    PRIOR STROKE/TIA/THROMBO    Vascular Disease    Age 65-74        We will continue Eliquis for now  Atrial fibrillation education provided today including: causes of afib, s/s, risks for stroke and use of anticoagulation for stroke prevention and treatment of atrial fibrillation. Rhythm vs rate control discussed as well as medication management.  Patient's daughter plans to follow-up with her PCP at [facility] closely after discharge.  She will call us if she needs a cardiology referral    2. Essential hypertension  Appears well controlled.  Continue to monitor    3. Bilateral lower extremity edema  Stable symptoms.  Reports history of edema and weeping.  Likely secondary to chronic prednisone, dependent edema, hypoalbuminemia.  Echo showed normal heart structure    4. Hyponatremia  Labs reviewed.  Last sodium 130.  Patient is on fluid restriction    Total discussion time with nurse, daughter and pt was 25 min    Follow Up {Instructions Charge Capture  Follow-up Communications :23}   No follow-ups on file.    Patient was given instructions and counseling regarding her condition or for health maintenance advice. Please see specific information pulled into the AVS if appropriate.  Advised to call the Heart and Valve Center with any questions, concerns, or worsening symptoms.

## 2022-12-07 ENCOUNTER — OFFICE VISIT (OUTPATIENT)
Dept: UROLOGY | Facility: CLINIC | Age: 82
End: 2022-12-07

## 2022-12-07 VITALS — HEIGHT: 64 IN | WEIGHT: 222 LBS | BODY MASS INDEX: 37.9 KG/M2

## 2022-12-07 DIAGNOSIS — R33.9 URINARY RETENTION: Primary | ICD-10-CM

## 2022-12-07 DIAGNOSIS — B37.9 YEAST INFECTION: ICD-10-CM

## 2022-12-07 PROCEDURE — 99213 OFFICE O/P EST LOW 20 MIN: CPT | Performed by: NURSE PRACTITIONER

## 2022-12-07 RX ORDER — FLUCONAZOLE 150 MG/1
150 TABLET ORAL ONCE
Qty: 1 TABLET | Refills: 0 | Status: CANCELLED | OUTPATIENT
Start: 2022-12-07 | End: 2022-12-07

## 2022-12-07 NOTE — PROGRESS NOTES
Office Visit New Urology      Patient Name: Sol Lovelace  : 1940   MRN: [id number]     Chief Complaint:    Chief Complaint   Patient presents with   • Urinary Retention       Referring Provider: Young Huynh MD    History of Present Illness: Sol Lovelace is a 82 y.o. female who presents to Urology today for urinary retention. She was recently discharged from [facility] on 22 after three week hospital course for hyponatremia, urinary retention and urinary tract infection. She failed two inpatient voiding trials and was discharged with indwelling gerber catheter to the Rehab Facility ([facility]) . She reports prior to hospital admission she was living at home independently. She reports difficulty fully emptying her bladder prior to hospital admission. She presents today in a stretcher as she is unable to ambulate independently. She has been working with physical therapy and is able to sit and stand on the side of the bed. Her daughter is with her today.     She reports hx of bladder lift in  with Dr. Shepard.     She is not diabetic. She has been taking Flomax 0.4mg once daily. Her gerber catheter has not been exchanged since hospital discharge, as the rehab facility is not able to exchange gerber catheters.   Subjective      Review of System: Review of Systems   Constitutional: Negative for chills, fatigue, fever and unexpected weight change.   HENT: Negative for sore throat.    Eyes: Negative for visual disturbance.   Respiratory: Negative for cough, chest tightness and shortness of breath.    Cardiovascular: Negative for chest pain and leg swelling.   Gastrointestinal: Negative for blood in stool, constipation, diarrhea, nausea, rectal pain and vomiting.   Genitourinary: Negative for decreased urine volume, dysuria, enuresis, flank pain, frequency, genital sores, hematuria and urgency.        Urinary retention, gerber catheter   Musculoskeletal: Negative for back  pain and joint swelling.        Unable to ambulate   Skin: Negative for rash and wound.   Neurological: Positive for weakness. Negative for seizures, speech difficulty and headaches.   Psychiatric/Behavioral: Negative for confusion, sleep disturbance and suicidal ideas. The patient is not nervous/anxious.       I have reviewed the ROS documented by my clinical staff, updated appropriately and I agree. GLADIS Phillips    Past Medical History:   Past Medical History:   Diagnosis Date   • Asthma    • Chronic back pain    • GERD (gastroesophageal reflux disease)    • Hyperlipidemia    • Hypertension    • Osteoarthritis    • Rheumatoid arthritis (HCC)        Past Surgical History:   Past Surgical History:   Procedure Laterality Date   • LAPAROSCOPIC VAGINAL HYSTERECTOMY      BSO, bladder suspension       Family History: History reviewed. No pertinent family history.    Social History:   Social History     Socioeconomic History   • Marital status:    Tobacco Use   • Smoking status: Never   Substance and Sexual Activity   • Alcohol use: Not Currently   • Drug use: Never       Medications:     Current Outpatient Medications:   •  albuterol sulfate  (90 Base) MCG/ACT inhaler, Inhale 2 puffs Every 4 (Four) Hours As Needed., Disp: , Rfl:   •  apixaban (ELIQUIS) 5 MG tablet tablet, Take 1 tablet by mouth Every 12 (Twelve) Hours. Indications: Atrial Fibrillation, Disp: 60 tablet, Rfl:   •  atorvastatin (LIPITOR) 20 MG tablet, Take 1 tablet by mouth Daily., Disp: , Rfl:   •  B Complex Vitamins (vitamin b complex) capsule capsule, Take 1 capsule by mouth Daily., Disp: , Rfl:   •  bisacodyl (DULCOLAX) 5 MG EC tablet, Take 2 tablets by mouth Daily., Disp: , Rfl:   •  bisoprolol (ZEBeta) 10 MG tablet, Take 1 tablet by mouth Daily., Disp: , Rfl:   •  Cyanocobalamin (B-12) 2500 MCG sublingual tablet, Place 2 tablets under the tongue Daily., Disp: , Rfl:   •  Fluticasone-Salmeterol (ADVAIR/WIXELA) 250-50 MCG/ACT DISKUS,  "Inhale 1 puff 2 (Two) Times a Day., Disp: , Rfl:   •  NIFEdipine XL (ADALAT CC) 30 MG 24 hr tablet, Take 1 tablet by mouth Daily., Disp: , Rfl:   •  ondansetron (ZOFRAN) 4 MG tablet, Take 1 tablet by mouth Every 6 (Six) Hours As Needed for Nausea or Vomiting., Disp: , Rfl:   •  pantoprazole (PROTONIX) 40 MG EC tablet, Take 1 tablet by mouth Every Morning., Disp: , Rfl:   •  predniSONE (DELTASONE) 20 MG tablet, Take 1 tablet by mouth Daily., Disp: , Rfl:   •  simethicone (MYLICON) 80 MG chewable tablet, Chew 2 tablets 3 (Three) Times a Day As Needed for Flatulence., Disp: , Rfl:   •  tamsulosin (FLOMAX) 0.4 MG capsule 24 hr capsule, Take 1 capsule by mouth Daily., Disp: 30 capsule, Rfl:     Allergies:   Allergies   Allergen Reactions   • Macrobid [Nitrofurantoin] Nausea And Vomiting   • Sulfa Antibiotics Unknown - Low Severity           Objective     Physical Exam:   Vital Signs:   Vitals:    12/07/22 1152   Weight: 101 kg (222 lb)   Height: 162.6 cm (64.02\")   PainSc: 0-No pain     Body mass index is 38.09 kg/m².     Physical Exam  Vitals and nursing note reviewed.   Constitutional:       Appearance: She is obese.   HENT:      Head: Normocephalic and atraumatic.      Nose: Nose normal.      Mouth/Throat:      Mouth: Mucous membranes are moist.   Eyes:      Pupils: Pupils are equal, round, and reactive to light.   Pulmonary:      Effort: Pulmonary effort is normal.   Abdominal:      General: Abdomen is flat.      Palpations: Abdomen is soft.   Genitourinary:     Comments: Erythema of vulva. White discharge noted from vagina during gerber catheter exchange.   Musculoskeletal:      Cervical back: Normal range of motion.      Comments: On stretcher. Unable to ambulate.   Skin:     General: Skin is warm and dry.      Capillary Refill: Capillary refill takes less than 2 seconds.   Neurological:      General: No focal deficit present.      Mental Status: She is alert.   Psychiatric:         Mood and Affect: Mood normal. "         Labs:   Brief Urine Lab Results  (Last result in the past 365 days)      Color   Clarity   Blood   Leuk Est   Nitrite   Protein   CREAT   Urine HCG        10/28/22 1142             34.0               Urine Culture    Urine Culture 10/18/22 10/18/22    1742 1742   Urine Culture >100,000 CFU/mL Escherichia coli (A) >100,000 CFU/mL Proteus mirabilis (A)   (A) Abnormal value               Lab Results   Component Value Date    GLUCOSE 102 (H) 11/02/2022    CALCIUM 8.3 (L) 11/02/2022     (L) 11/02/2022    K 3.7 11/02/2022    CO2 21.0 (L) 11/02/2022    CL 95 (L) 11/02/2022    BUN 12 11/02/2022    CREATININE 0.34 (L) 11/02/2022    BCR 35.3 (H) 11/02/2022    ANIONGAP 13.0 11/02/2022       Lab Results   Component Value Date    WBC 9.10 10/31/2022    HGB 10.9 (L) 10/31/2022    HCT 33.7 (L) 10/31/2022    MCV 84.3 10/31/2022     10/31/2022         Measures:   Tobacco:   Sol Lovelace  reports that she has never smoked. She does not have any smokeless tobacco history on file..    Urine Incontinence: ( NOUI)    Assessment / Plan      Assessment/Plan:   82 y.o. female is here today for urinary retention.   I had a detailed discussion with patient and patients daughter regarding her urinary retention.  I discussed that her urinary retention could be due to multiple factors including extended hospital stay, constipation, urinary tract infection, and overall decreased physical mobility.  I am hopeful that as her physical mobility continues to improve with physical therapy that we will be able to attempt a voiding trial at her next office visit. Patient and family are understanding and agreeable with plan.     At the bedside, using sterile technique the gerber catheter was exchange without incident. There was immediate return of clear/yellow urine output. The gerber catheter was secured with 10 cc sterile water and attached to gravity bag drainage.     Diagnoses and all orders for this visit:    1. Urinary retention  (Primary)  -Continue gerber catheter, return to the clinic in 1 month to assess improvement in physical mobility    2. Yeast infection  -Begin Fluconazole 150mg once daily x2 days, no refills. Order faxed to [facility]. Of note, I spoke with the RN at the [facility] who stated patient just started course of Ciprofloxacin.       Follow Up:   Return in about 4 weeks (around 1/4/2023) for 4-6 weeks with Lily Rodriguez.    I spent approximately 30 minutes providing clinical care for this patient; including review of patient's chart and provider documentation, face to face time spent with patient in examination room (obtaining history, performing physical exam, discussing diagnosis and management options), placing orders, and completing patient documentation.     Lily Rodriguez [facility]

## 2023-01-04 ENCOUNTER — OFFICE VISIT (OUTPATIENT)
Dept: UROLOGY | Facility: CLINIC | Age: 83
End: 2023-01-04
Payer: MEDICARE

## 2023-01-04 VITALS — HEIGHT: 64 IN | WEIGHT: 222 LBS | BODY MASS INDEX: 37.9 KG/M2

## 2023-01-04 DIAGNOSIS — Z87.440 HISTORY OF RECURRENT UTIS: ICD-10-CM

## 2023-01-04 DIAGNOSIS — B37.9 YEAST INFECTION: ICD-10-CM

## 2023-01-04 DIAGNOSIS — R33.9 URINARY RETENTION: Primary | ICD-10-CM

## 2023-01-04 DIAGNOSIS — R53.1 WEAKNESS: ICD-10-CM

## 2023-01-04 PROCEDURE — 99215 OFFICE O/P EST HI 40 MIN: CPT | Performed by: NURSE PRACTITIONER

## 2023-01-04 RX ORDER — FLUCONAZOLE 100 MG/1
100 TABLET ORAL DAILY
Qty: 3 TABLET | Refills: 0 | Status: SHIPPED | OUTPATIENT
Start: 2023-01-04 | End: 2023-01-07

## 2023-01-04 RX ORDER — ESTRADIOL 0.1 MG/G
CREAM VAGINAL
Qty: 42.5 G | Refills: 6 | Status: SHIPPED | OUTPATIENT
Start: 2023-01-04 | End: 2023-03-09 | Stop reason: HOSPADM

## 2023-01-04 NOTE — PROGRESS NOTES
Follow Up Office Visit      Patient Name: Sol Lovelace  : 1940   MRN: [id number]     Chief Complaint:    Chief Complaint   Patient presents with   • Urinary Retention       Referring Provider: No ref. provider found    History of Present Illness: Sol Lovelace is a 82 y.o. female who presents today for follow up of urinary retention. She was recently discharged from Rehab Facility. She has an indwelling gerber catheter in place draining yellow/clear urine output. Her daughter reports cloudy urine. Her urine was sent for culture by PCP and was + E.Coli. She is completing her last dose of Cefuroxime today. She is currently in wheelchair today. Per daughter patient is unable to stand by herself, needs max assistance. She is incontinent of bowel.     Subjective      Review of System: Review of Systems   Constitutional: Negative.    HENT: Negative.    Eyes: Negative.    Respiratory: Negative.    Cardiovascular: Positive for leg swelling.   Gastrointestinal: Negative.    Endocrine: Negative.    Genitourinary:        Urinary retention   Musculoskeletal: Negative.    Skin: Negative.    Allergic/Immunologic: Negative.    Neurological: Negative.    Hematological: Negative.    Psychiatric/Behavioral: Negative.       I have reviewed the ROS documented by my clinical staff, I have updated appropriately and I agree. GLADIS Phillips    I have reviewed and the following portions of the patient's history were updated as appropriate: past family history, past medical history, past social history, past surgical history and problem list.    Medications:     Current Outpatient Medications:   •  albuterol sulfate  (90 Base) MCG/ACT inhaler, Inhale 2 puffs Every 4 (Four) Hours As Needed., Disp: , Rfl:   •  apixaban (ELIQUIS) 5 MG tablet tablet, Take 1 tablet by mouth Every 12 (Twelve) Hours. Indications: Atrial Fibrillation, Disp: 60 tablet, Rfl:   •  atorvastatin (LIPITOR) 20 MG tablet, Take 1 tablet by mouth  Daily., Disp: , Rfl:   •  B Complex Vitamins (vitamin b complex) capsule capsule, Take 1 capsule by mouth Daily., Disp: , Rfl:   •  bisacodyl (DULCOLAX) 5 MG EC tablet, Take 2 tablets by mouth Daily., Disp: , Rfl:   •  bisoprolol (ZEBeta) 10 MG tablet, Take 1 tablet by mouth Daily., Disp: , Rfl:   •  Cyanocobalamin (B-12) 2500 MCG sublingual tablet, Place 2 tablets under the tongue Daily., Disp: , Rfl:   •  Fluticasone-Salmeterol (ADVAIR/WIXELA) 250-50 MCG/ACT DISKUS, Inhale 1 puff 2 (Two) Times a Day., Disp: , Rfl:   •  NIFEdipine XL (ADALAT CC) 30 MG 24 hr tablet, Take 1 tablet by mouth Daily., Disp: , Rfl:   •  ondansetron (ZOFRAN) 4 MG tablet, Take 1 tablet by mouth Every 6 (Six) Hours As Needed for Nausea or Vomiting., Disp: , Rfl:   •  pantoprazole (PROTONIX) 40 MG EC tablet, Take 1 tablet by mouth Every Morning., Disp: , Rfl:   •  predniSONE (DELTASONE) 20 MG tablet, Take 1 tablet by mouth Daily., Disp: , Rfl:   •  simethicone (MYLICON) 80 MG chewable tablet, Chew 2 tablets 3 (Three) Times a Day As Needed for Flatulence., Disp: , Rfl:   •  tamsulosin (FLOMAX) 0.4 MG capsule 24 hr capsule, Take 1 capsule by mouth Daily., Disp: 30 capsule, Rfl:   •  estradiol (ESTRACE) 0.1 MG/GM vaginal cream, Inert 2-4 g into the vagina three times weekly, Disp: 42.5 g, Rfl: 6  •  fluconazole (Diflucan) 100 MG tablet, Take 1 tablet by mouth Daily for 3 days., Disp: 3 tablet, Rfl: 0    Allergies:   Allergies   Allergen Reactions   • Macrobid [Nitrofurantoin] Nausea And Vomiting   • Naproxen Unknown (See Comments)   • Oxaprozin Hives   • Sulfa Antibiotics Unknown - Low Severity       Objective     Physical Exam:   Vital Signs:   Vitals:    01/04/23 1514   Weight: 101 kg (222 lb)   Height: 162.6 cm (64.02\")   PainSc: 0-No pain     Body mass index is 38.09 kg/m².     Physical Exam  Vitals and nursing note reviewed.   Constitutional:       Appearance: Normal appearance. She is obese.   HENT:      Head: Normocephalic and atraumatic.       Nose: Nose normal.      Mouth/Throat:      Mouth: Mucous membranes are moist.   Eyes:      Pupils: Pupils are equal, round, and reactive to light.   Pulmonary:      Effort: Pulmonary effort is normal.   Abdominal:      Palpations: Abdomen is soft.   Genitourinary:     Comments: Vulvar erythema/edema. White discharge from vagina/urethra during gerber catheter exchange.   Musculoskeletal:      Cervical back: Normal range of motion.      Comments: Unable to ambulate without wheelchair, weakness   Skin:     General: Skin is warm and dry.      Capillary Refill: Capillary refill takes less than 2 seconds.   Neurological:      Mental Status: She is alert.      Motor: Weakness present.         Labs:   Brief Urine Lab Results  (Last result in the past 365 days)      Color   Clarity   Blood   Leuk Est   Nitrite   Protein   CREAT   Urine HCG        10/28/22 1142             34.0               Urine Culture    Urine Culture 10/18/22 10/18/22    1742 1742   Urine Culture >100,000 CFU/mL Escherichia coli (A) >100,000 CFU/mL Proteus mirabilis (A)   (A) Abnormal value               Lab Results   Component Value Date    GLUCOSE 102 (H) 11/02/2022    CALCIUM 8.3 (L) 11/02/2022     (L) 11/02/2022    K 3.7 11/02/2022    CO2 21.0 (L) 11/02/2022    CL 95 (L) 11/02/2022    BUN 12 11/02/2022    CREATININE 0.34 (L) 11/02/2022    BCR 35.3 (H) 11/02/2022    ANIONGAP 13.0 11/02/2022       Lab Results   Component Value Date    WBC 9.10 10/31/2022    HGB 10.9 (L) 10/31/2022    HCT 33.7 (L) 10/31/2022    MCV 84.3 10/31/2022     10/31/2022       Images:   CT Head Without Contrast    Result Date: 10/18/2022  Age-related changes of the brain as above, otherwise without evidence of acute intracranial abnormality.   This report was finalized on 10/18/2022 7:48 PM by Marbin Schroeder.      XR Chest 1 View    Result Date: 10/25/2022  Vague density right perihilar region that may be secondary to pneumonia. Follow-up x-ray would be  recommended to document resolution following medical therapy.  This report was finalized on 10/25/2022 9:33 AM by Jone Crowley MD.      XR Chest 1 View    Result Date: 10/18/2022  1.  Poor inspiration with crowding of structures due to depth of inspiration. 2.  Scoliosis  This report was finalized on 10/18/2022 5:00 PM by Jone Crowley MD.      XR Abdomen KUB    Result Date: 10/30/2022  Nonspecific gas pattern.  This report was finalized on 10/30/2022 5:09 PM by Barrie Jacobo MD.      XR Abdomen KUB    Result Date: 10/26/2022  Mildly increased bowel gas, mostly in the colon, a nonspecific pattern. Stool shadow is visible only in the rectum, where a very mild low level impaction could be present.  This report was finalized on 10/26/2022 6:27 PM by Dr. Silas Dubose MD.        Measures:   Tobacco:   Sol Lovelace  reports that she has never smoked. She does not have any smokeless tobacco history on file..        Urine Incontinence: ( NOUI)    Assessment / Plan      Assessment/Plan:   82 y.o. female who presented today for follow up of urinary retention. I discussed other options to include attempting voiding trial today, learning self-intermittent catheterization and/or need for suprapubic tube in the future if she unable to void without gerber catheter. Patient daughter declines voiding trial today as patient is non-mobile at home/max-assistance to stand and does not currently have home health. She does not feel that she would be able to perform SIC for her mother at this time.     At the bedside, using sterile technique I was able to pass a 16 Burmese gerber catheter with mild difficulty. Difficulty visualizing meatus due to anatomy, vulvar edema, and vaginal discharge. Once the gerber catheter was passed there was return of clear/yellow urine. There was not enough urine for a urine sample. I manually irrigated the gerber catheter with 50 cc sterile water. The gerber catheter flushed easily with clear/yellow urine return. The  gerber catheter was secured with 10 cc sterile water and attached to gravity bag drainage. She tolerated fairly. LUIS Wadsworth at bedside for gerber catheter placement.     Given edema/erythema of vaginal tissue and history of recurrent UTIs we will plan to begin Estrace cream three times weekly. Given vaginal discharge/signs of yeast infection we will prescribe Fluconazole x3 days. Her daughter does not believe her mother ever received previous prescription of Fluconazole.     I discussed proceeding with gerber catheter exchanges at home once she obtains home health. Given difficulty with placement, they would prefer to return to our office once a month for gerber catheter exchanges. We will continue to assess her improvement in mobility and hopefully attempt voiding trial in next several months. Patient and daughter are agreeable with this plan.     Diagnoses and all orders for this visit:    1. Urinary retention (Primary)    2. Weakness    3. Yeast infection  -     fluconazole (Diflucan) 100 MG tablet; Take 1 tablet by mouth Daily for 3 days.  Dispense: 3 tablet; Refill: 0    4. History of recurrent UTIs  -     estradiol (ESTRACE) 0.1 MG/GM vaginal cream; Inert 2-4 g into the vagina three times weekly  Dispense: 42.5 g; Refill: 6           Follow Up:   Return for 1 month for gerber catheter exchange.    I spent approximately 60 minutes providing clinical care for this patient; including review of patient's chart and provider documentation, face to face time spent with patient in examination room (obtaining history, performing physical exam, discussing diagnosis and management options), placing orders, and completing patient documentation.     GLADIS Phillips  [facility]

## 2023-01-23 ENCOUNTER — CLINICAL SUPPORT (OUTPATIENT)
Dept: UROLOGY | Facility: CLINIC | Age: 83
End: 2023-01-23
Payer: MEDICARE

## 2023-01-23 ENCOUNTER — TELEPHONE (OUTPATIENT)
Dept: UROLOGY | Facility: CLINIC | Age: 83
End: 2023-01-23
Payer: MEDICARE

## 2023-01-23 VITALS — WEIGHT: 222 LBS | HEIGHT: 64 IN | BODY MASS INDEX: 37.9 KG/M2

## 2023-01-23 DIAGNOSIS — T83.511A URINARY TRACT INFECTION ASSOCIATED WITH INDWELLING URETHRAL CATHETER, INITIAL ENCOUNTER: Primary | ICD-10-CM

## 2023-01-23 DIAGNOSIS — R33.8 ACUTE URINARY RETENTION: ICD-10-CM

## 2023-01-23 DIAGNOSIS — N39.0 URINARY TRACT INFECTION ASSOCIATED WITH INDWELLING URETHRAL CATHETER, INITIAL ENCOUNTER: Primary | ICD-10-CM

## 2023-01-23 LAB
BILIRUB BLD-MCNC: NEGATIVE MG/DL
CLARITY, POC: ABNORMAL
COLOR UR: YELLOW
EXPIRATION DATE: ABNORMAL
GLUCOSE UR STRIP-MCNC: NEGATIVE MG/DL
KETONES UR QL: NEGATIVE
LEUKOCYTE EST, POC: ABNORMAL
Lab: ABNORMAL
NITRITE UR-MCNC: NEGATIVE MG/ML
PH UR: 6 [PH] (ref 5–8)
PROT UR STRIP-MCNC: NEGATIVE MG/DL
RBC # UR STRIP: ABNORMAL /UL
SP GR UR: 1.01 (ref 1–1.03)
UROBILINOGEN UR QL: NORMAL

## 2023-01-23 PROCEDURE — 87086 URINE CULTURE/COLONY COUNT: CPT | Performed by: UROLOGY

## 2023-01-23 PROCEDURE — 81003 URINALYSIS AUTO W/O SCOPE: CPT | Performed by: NURSE PRACTITIONER

## 2023-01-23 PROCEDURE — 87186 SC STD MICRODIL/AGAR DIL: CPT | Performed by: UROLOGY

## 2023-01-23 PROCEDURE — 87088 URINE BACTERIA CULTURE: CPT | Performed by: UROLOGY

## 2023-01-23 PROCEDURE — 51702 INSERT TEMP BLADDER CATH: CPT | Performed by: NURSE PRACTITIONER

## 2023-01-23 RX ORDER — NITROFURANTOIN 25; 75 MG/1; MG/1
100 CAPSULE ORAL 2 TIMES DAILY
Qty: 14 CAPSULE | Refills: 0 | Status: SHIPPED | OUTPATIENT
Start: 2023-01-23 | End: 2023-03-09 | Stop reason: HOSPADM

## 2023-01-23 NOTE — TELEPHONE ENCOUNTER
Stephanie, patient's daughter was changing patient's depend, and the catheter came off.      Stephanie asking is they can come in and have some one put in another catheter.      Informed Stephanie, they can come in for a nurse visit to have another catheter put in.

## 2023-01-23 NOTE — PROGRESS NOTES
Tete Valderrama MA was able to exchange gerber catheter in office today with clear/yellow urine return. Patients daughter at bedside reports history of burning and hematuria several days ago to gerber catheter. Will send urine for culture and begin Macrobid 100mg BID.     Will call with culture results.     Her gerber catheter exchanges have been quite traumatic due to thin/raw vaginal tissue and significant pain with gerber catheter insertions. We will have patient follow up in 2 weeks with Dr. Knight to discuss need for possible suprapubic catheter placement. Patient and patients daughter are agreeable with plan. Patient ambulating with wheelchair. She is max assistance out of wheelchair.

## 2023-01-24 ENCOUNTER — TELEPHONE (OUTPATIENT)
Dept: UROLOGY | Facility: CLINIC | Age: 83
End: 2023-01-24
Payer: MEDICARE

## 2023-01-24 DIAGNOSIS — T83.511A URINARY TRACT INFECTION ASSOCIATED WITH INDWELLING URETHRAL CATHETER, INITIAL ENCOUNTER: ICD-10-CM

## 2023-01-24 DIAGNOSIS — N39.0 URINARY TRACT INFECTION ASSOCIATED WITH INDWELLING URETHRAL CATHETER, INITIAL ENCOUNTER: ICD-10-CM

## 2023-01-24 RX ORDER — CIPROFLOXACIN 500 MG/1
500 TABLET, FILM COATED ORAL 2 TIMES DAILY
Qty: 10 TABLET | Refills: 0 | Status: SHIPPED | OUTPATIENT
Start: 2023-01-24 | End: 2023-01-25

## 2023-01-24 NOTE — TELEPHONE ENCOUNTER
I called the patients daughter to let her know that Lily Rodriguez sent in the new medication Ciprofloxacin and that she has taken it before so there shouldn't be any issues, and to call us if she has any problems.

## 2023-01-24 NOTE — TELEPHONE ENCOUNTER
Patients daughter called saying she was allergic to the medication she was given yesterday after her appointment and was needing a different medication

## 2023-01-25 DIAGNOSIS — N39.0 URINARY TRACT INFECTION ASSOCIATED WITH INDWELLING URETHRAL CATHETER, INITIAL ENCOUNTER: Primary | ICD-10-CM

## 2023-01-25 DIAGNOSIS — T83.511A URINARY TRACT INFECTION ASSOCIATED WITH INDWELLING URETHRAL CATHETER, INITIAL ENCOUNTER: Primary | ICD-10-CM

## 2023-01-25 LAB — BACTERIA SPEC AEROBE CULT: ABNORMAL

## 2023-01-25 RX ORDER — LEVOFLOXACIN 500 MG/1
500 TABLET, FILM COATED ORAL DAILY
Qty: 10 TABLET | Refills: 0 | Status: SHIPPED | OUTPATIENT
Start: 2023-01-25 | End: 2023-02-04

## 2023-01-26 ENCOUNTER — TELEPHONE (OUTPATIENT)
Dept: UROLOGY | Facility: CLINIC | Age: 83
End: 2023-01-26
Payer: MEDICARE

## 2023-01-26 NOTE — TELEPHONE ENCOUNTER
----- Message from GLADIS Phillips sent at 1/25/2023  4:33 PM EST -----  Regarding: urine culture  Hi,     Can you please let patient know Urine culture with E.Coli. Stop Cipro. I sent Levaquin to patients pharmacy to start. Thank you    Lily Rodriguez

## 2023-01-26 NOTE — TELEPHONE ENCOUNTER
I let the patients daughter know her mothers Urine culture was positive with E.Coli and to stop Cipro because kristal hernandez sent Levaquin to patients pharmacy to start.

## 2023-02-07 ENCOUNTER — TELEPHONE (OUTPATIENT)
Dept: UROLOGY | Facility: CLINIC | Age: 83
End: 2023-02-07
Payer: MEDICARE

## 2023-02-07 NOTE — TELEPHONE ENCOUNTER
Left VM for PT's daughter as they missed PT's appointment, and also need to schedule gerber catheter exchange for this month on 2/23/23 as a nurse visit. Asked Stephanie to return call to office.

## 2023-02-20 ENCOUNTER — TELEPHONE (OUTPATIENT)
Dept: UROLOGY | Facility: CLINIC | Age: 83
End: 2023-02-20

## 2023-02-20 NOTE — TELEPHONE ENCOUNTER
Caller: ALETA MICHAEL    Relationship to patient: DAUGHTER    Best call back number: [phone]    Patient is needing: STATES SHE IS UNABLE TO GET PT DRESSED AND OUT THE DOOR TO APPT FOR CATHETER CHANGE TODAY. RESCHEDULED TO 3/1/23.

## 2023-03-01 ENCOUNTER — OFFICE VISIT (OUTPATIENT)
Dept: UROLOGY | Facility: CLINIC | Age: 83
End: 2023-03-01
Payer: MEDICARE

## 2023-03-01 ENCOUNTER — HOSPITAL ENCOUNTER (INPATIENT)
Facility: HOSPITAL | Age: 83
LOS: 8 days | Discharge: HOSPICE | DRG: 641 | End: 2023-03-09
Attending: INTERNAL MEDICINE | Admitting: INTERNAL MEDICINE
Payer: MEDICARE

## 2023-03-01 DIAGNOSIS — Z97.8 CHRONIC INDWELLING FOLEY CATHETER: Primary | ICD-10-CM

## 2023-03-01 DIAGNOSIS — R33.8 ACUTE URINARY RETENTION: ICD-10-CM

## 2023-03-01 PROBLEM — R19.7 DIARRHEA: Status: ACTIVE | Noted: 2023-03-01

## 2023-03-01 PROBLEM — S31.809A BUTTOCK WOUND: Status: ACTIVE | Noted: 2023-03-01

## 2023-03-01 LAB
ALBUMIN SERPL-MCNC: 2.5 G/DL (ref 3.5–5.2)
ALBUMIN/GLOB SERPL: 0.9 G/DL
ALP SERPL-CCNC: 69 U/L (ref 39–117)
ALT SERPL W P-5'-P-CCNC: 15 U/L (ref 1–33)
ANION GAP SERPL CALCULATED.3IONS-SCNC: 15 MMOL/L (ref 5–15)
AST SERPL-CCNC: 25 U/L (ref 1–32)
BACTERIA UR QL AUTO: ABNORMAL /HPF
BASOPHILS # BLD AUTO: 0.05 10*3/MM3 (ref 0–0.2)
BASOPHILS NFR BLD AUTO: 0.4 % (ref 0–1.5)
BILIRUB SERPL-MCNC: 0.9 MG/DL (ref 0–1.2)
BILIRUB UR QL STRIP: ABNORMAL
BUN SERPL-MCNC: 18 MG/DL (ref 8–23)
BUN/CREAT SERPL: 34 (ref 7–25)
CALCIUM SPEC-SCNC: 8.2 MG/DL (ref 8.6–10.5)
CHLORIDE SERPL-SCNC: 96 MMOL/L (ref 98–107)
CK SERPL-CCNC: 101 U/L (ref 20–180)
CLARITY UR: ABNORMAL
CO2 SERPL-SCNC: 22 MMOL/L (ref 22–29)
COLOR UR: ABNORMAL
CORTIS SERPL-MCNC: 19.33 MCG/DL
CREAT SERPL-MCNC: 0.53 MG/DL (ref 0.57–1)
D-LACTATE SERPL-SCNC: 3.2 MMOL/L (ref 0.5–2)
DEPRECATED RDW RBC AUTO: 55.1 FL (ref 37–54)
EGFRCR SERPLBLD CKD-EPI 2021: 93.6 ML/MIN/1.73
EOSINOPHIL # BLD AUTO: 0.03 10*3/MM3 (ref 0–0.4)
EOSINOPHIL NFR BLD AUTO: 0.2 % (ref 0.3–6.2)
ERYTHROCYTE [DISTWIDTH] IN BLOOD BY AUTOMATED COUNT: 16.8 % (ref 12.3–15.4)
GLOBULIN UR ELPH-MCNC: 2.8 GM/DL
GLUCOSE SERPL-MCNC: 91 MG/DL (ref 65–99)
GLUCOSE UR STRIP-MCNC: NEGATIVE MG/DL
HBA1C MFR BLD: 5.7 % (ref 4.8–5.6)
HCT VFR BLD AUTO: 41.5 % (ref 34–46.6)
HGB BLD-MCNC: 12.9 G/DL (ref 12–15.9)
HGB UR QL STRIP.AUTO: ABNORMAL
IMM GRANULOCYTES # BLD AUTO: 0.08 10*3/MM3 (ref 0–0.05)
IMM GRANULOCYTES NFR BLD AUTO: 0.6 % (ref 0–0.5)
KETONES UR QL STRIP: ABNORMAL
LEUKOCYTE ESTERASE UR QL STRIP.AUTO: ABNORMAL
LYMPHOCYTES # BLD AUTO: 1.44 10*3/MM3 (ref 0.7–3.1)
LYMPHOCYTES NFR BLD AUTO: 11.5 % (ref 19.6–45.3)
MAGNESIUM SERPL-MCNC: 1.9 MG/DL (ref 1.6–2.4)
MCH RBC QN AUTO: 28.4 PG (ref 26.6–33)
MCHC RBC AUTO-ENTMCNC: 31.1 G/DL (ref 31.5–35.7)
MCV RBC AUTO: 91.4 FL (ref 79–97)
MONOCYTES # BLD AUTO: 0.5 10*3/MM3 (ref 0.1–0.9)
MONOCYTES NFR BLD AUTO: 4 % (ref 5–12)
NEUTROPHILS NFR BLD AUTO: 10.47 10*3/MM3 (ref 1.7–7)
NEUTROPHILS NFR BLD AUTO: 83.3 % (ref 42.7–76)
NITRITE UR QL STRIP: NEGATIVE
NRBC BLD AUTO-RTO: 0 /100 WBC (ref 0–0.2)
PH UR STRIP.AUTO: <=5 [PH] (ref 5–8)
PLATELET # BLD AUTO: 164 10*3/MM3 (ref 140–450)
PMV BLD AUTO: 9.9 FL (ref 6–12)
POTASSIUM SERPL-SCNC: 2.9 MMOL/L (ref 3.5–5.2)
PROCALCITONIN SERPL-MCNC: 0.33 NG/ML (ref 0–0.25)
PROT SERPL-MCNC: 5.3 G/DL (ref 6–8.5)
PROT UR QL STRIP: ABNORMAL
RBC # BLD AUTO: 4.54 10*6/MM3 (ref 3.77–5.28)
RBC # UR STRIP: ABNORMAL /HPF
REF LAB TEST METHOD: ABNORMAL
SODIUM SERPL-SCNC: 133 MMOL/L (ref 136–145)
SP GR UR STRIP: 1.03 (ref 1–1.03)
SQUAMOUS #/AREA URNS HPF: ABNORMAL /HPF
TROPONIN T SERPL HS-MCNC: 67 NG/L
TSH SERPL DL<=0.05 MIU/L-ACNC: 5.39 UIU/ML (ref 0.27–4.2)
UROBILINOGEN UR QL STRIP: ABNORMAL
WBC # UR STRIP: ABNORMAL /HPF
WBC NRBC COR # BLD: 12.57 10*3/MM3 (ref 3.4–10.8)

## 2023-03-01 PROCEDURE — 83036 HEMOGLOBIN GLYCOSYLATED A1C: CPT | Performed by: INTERNAL MEDICINE

## 2023-03-01 PROCEDURE — 84484 ASSAY OF TROPONIN QUANT: CPT | Performed by: INTERNAL MEDICINE

## 2023-03-01 PROCEDURE — 82550 ASSAY OF CK (CPK): CPT | Performed by: INTERNAL MEDICINE

## 2023-03-01 PROCEDURE — 93010 ELECTROCARDIOGRAM REPORT: CPT | Performed by: STUDENT IN AN ORGANIZED HEALTH CARE EDUCATION/TRAINING PROGRAM

## 2023-03-01 PROCEDURE — 87186 SC STD MICRODIL/AGAR DIL: CPT | Performed by: INTERNAL MEDICINE

## 2023-03-01 PROCEDURE — 93005 ELECTROCARDIOGRAM TRACING: CPT | Performed by: INTERNAL MEDICINE

## 2023-03-01 PROCEDURE — 87040 BLOOD CULTURE FOR BACTERIA: CPT | Performed by: INTERNAL MEDICINE

## 2023-03-01 PROCEDURE — 81001 URINALYSIS AUTO W/SCOPE: CPT | Performed by: INTERNAL MEDICINE

## 2023-03-01 PROCEDURE — 99215 OFFICE O/P EST HI 40 MIN: CPT | Performed by: NURSE PRACTITIONER

## 2023-03-01 PROCEDURE — 84145 PROCALCITONIN (PCT): CPT | Performed by: INTERNAL MEDICINE

## 2023-03-01 PROCEDURE — 87077 CULTURE AEROBIC IDENTIFY: CPT | Performed by: INTERNAL MEDICINE

## 2023-03-01 PROCEDURE — 83605 ASSAY OF LACTIC ACID: CPT | Performed by: INTERNAL MEDICINE

## 2023-03-01 PROCEDURE — 87086 URINE CULTURE/COLONY COUNT: CPT | Performed by: INTERNAL MEDICINE

## 2023-03-01 PROCEDURE — 82533 TOTAL CORTISOL: CPT | Performed by: INTERNAL MEDICINE

## 2023-03-01 PROCEDURE — 85025 COMPLETE CBC W/AUTO DIFF WBC: CPT | Performed by: INTERNAL MEDICINE

## 2023-03-01 PROCEDURE — 83735 ASSAY OF MAGNESIUM: CPT | Performed by: INTERNAL MEDICINE

## 2023-03-01 PROCEDURE — 99223 1ST HOSP IP/OBS HIGH 75: CPT | Performed by: INTERNAL MEDICINE

## 2023-03-01 PROCEDURE — 80053 COMPREHEN METABOLIC PANEL: CPT | Performed by: INTERNAL MEDICINE

## 2023-03-01 PROCEDURE — 84443 ASSAY THYROID STIM HORMONE: CPT | Performed by: INTERNAL MEDICINE

## 2023-03-01 PROCEDURE — 25010000002 CEFTRIAXONE PER 250 MG: Performed by: INTERNAL MEDICINE

## 2023-03-01 RX ORDER — ATORVASTATIN CALCIUM 20 MG/1
20 TABLET, FILM COATED ORAL NIGHTLY
Status: DISCONTINUED | OUTPATIENT
Start: 2023-03-01 | End: 2023-03-09 | Stop reason: HOSPADM

## 2023-03-01 RX ORDER — POTASSIUM CHLORIDE 750 MG/1
40 CAPSULE, EXTENDED RELEASE ORAL EVERY 4 HOURS
Status: COMPLETED | OUTPATIENT
Start: 2023-03-01 | End: 2023-03-02

## 2023-03-01 RX ORDER — SODIUM CHLORIDE 9 MG/ML
40 INJECTION, SOLUTION INTRAVENOUS AS NEEDED
Status: DISCONTINUED | OUTPATIENT
Start: 2023-03-01 | End: 2023-03-09 | Stop reason: HOSPADM

## 2023-03-01 RX ORDER — VANCOMYCIN 2 GRAM/500 ML IN 0.9 % SODIUM CHLORIDE INTRAVENOUS
20 ONCE
Status: COMPLETED | OUTPATIENT
Start: 2023-03-01 | End: 2023-03-02

## 2023-03-01 RX ORDER — SODIUM CHLORIDE 0.9 % (FLUSH) 0.9 %
10 SYRINGE (ML) INJECTION EVERY 12 HOURS SCHEDULED
Status: DISCONTINUED | OUTPATIENT
Start: 2023-03-01 | End: 2023-03-09 | Stop reason: HOSPADM

## 2023-03-01 RX ORDER — SODIUM CHLORIDE 0.9 % (FLUSH) 0.9 %
10 SYRINGE (ML) INJECTION AS NEEDED
Status: DISCONTINUED | OUTPATIENT
Start: 2023-03-01 | End: 2023-03-09 | Stop reason: HOSPADM

## 2023-03-01 RX ORDER — HEPARIN SODIUM 5000 [USP'U]/ML
5000 INJECTION, SOLUTION INTRAVENOUS; SUBCUTANEOUS EVERY 8 HOURS SCHEDULED
Status: DISCONTINUED | OUTPATIENT
Start: 2023-03-01 | End: 2023-03-01

## 2023-03-01 RX ORDER — VANCOMYCIN HYDROCHLORIDE 1 G/200ML
10.3 INJECTION, SOLUTION INTRAVENOUS EVERY 12 HOURS SCHEDULED
Status: DISCONTINUED | OUTPATIENT
Start: 2023-03-02 | End: 2023-03-02

## 2023-03-01 RX ORDER — ACETAMINOPHEN 325 MG/1
650 TABLET ORAL EVERY 4 HOURS PRN
Status: DISCONTINUED | OUTPATIENT
Start: 2023-03-01 | End: 2023-03-09 | Stop reason: HOSPADM

## 2023-03-01 RX ORDER — HYDROCODONE BITARTRATE AND ACETAMINOPHEN 5; 325 MG/1; MG/1
1 TABLET ORAL EVERY 4 HOURS PRN
Status: DISPENSED | OUTPATIENT
Start: 2023-03-01 | End: 2023-03-08

## 2023-03-01 RX ORDER — BUDESONIDE AND FORMOTEROL FUMARATE DIHYDRATE 160; 4.5 UG/1; UG/1
2 AEROSOL RESPIRATORY (INHALATION)
Status: DISCONTINUED | OUTPATIENT
Start: 2023-03-01 | End: 2023-03-09 | Stop reason: HOSPADM

## 2023-03-01 RX ORDER — ALBUTEROL SULFATE 2.5 MG/3ML
2.5 SOLUTION RESPIRATORY (INHALATION) EVERY 6 HOURS PRN
Status: DISCONTINUED | OUTPATIENT
Start: 2023-03-01 | End: 2023-03-09 | Stop reason: HOSPADM

## 2023-03-01 RX ORDER — BISOPROLOL FUMARATE 5 MG/1
10 TABLET, FILM COATED ORAL
Status: DISCONTINUED | OUTPATIENT
Start: 2023-03-01 | End: 2023-03-09 | Stop reason: HOSPADM

## 2023-03-01 RX ADMIN — Medication 10 ML: at 21:17

## 2023-03-01 RX ADMIN — APIXABAN 5 MG: 5 TABLET, FILM COATED ORAL at 20:11

## 2023-03-01 RX ADMIN — POTASSIUM CHLORIDE 40 MEQ: 750 CAPSULE, EXTENDED RELEASE ORAL at 23:02

## 2023-03-01 RX ADMIN — ATORVASTATIN CALCIUM 20 MG: 20 TABLET, FILM COATED ORAL at 20:11

## 2023-03-01 RX ADMIN — SODIUM CHLORIDE 1000 ML: 9 INJECTION, SOLUTION INTRAVENOUS at 21:15

## 2023-03-01 RX ADMIN — ACETAMINOPHEN 325MG 650 MG: 325 TABLET ORAL at 20:11

## 2023-03-01 RX ADMIN — CEFTRIAXONE 2 G: 2 INJECTION, POWDER, FOR SOLUTION INTRAMUSCULAR; INTRAVENOUS at 23:02

## 2023-03-01 NOTE — H&P
[facility] Medicine Services  HISTORY AND PHYSICAL    Patient Name: Sol Lovelace  : 1940  MRN: [id number]  Primary Care Physician: Young Huynh MD    Subjective   Subjective     Chief Complaint:diarrhea    HPI:  Sol Lovelace is a 82 y.o. female who  has a past medical history of Asthma, Chronic back pain, GERD (gastroesophageal reflux disease), Hyperlipidemia, Hypertension, Osteoarthritis, and Rheumatoid arthritis (HCC). she presented to Urology clinic for a catheter exchange today (after missing her prior appointment for scheduled exchange). Patient was found to have diarrhea of up to 12 times a day with skin breakdown and possible fistulas near her bottom.  History from urology clinic  Family unable to answer the phone tonight.      Review of Systems   POOR historian  Otherwise complete ROS is negative except as mentioned in the HPI.    Personal History     Past Medical History:   Diagnosis Date   • Asthma    • Chronic back pain    • GERD (gastroesophageal reflux disease)    • Hyperlipidemia    • Hypertension    • Osteoarthritis    • Rheumatoid arthritis (HCC)        Past Surgical History:   Procedure Laterality Date   • LAPAROSCOPIC VAGINAL HYSTERECTOMY      BSO, bladder suspension       Family History: family history includes COPD in her father; Cancer in her mother.     Social History:  reports that she has never smoked. She does not have any smokeless tobacco history on file. She reports that she does not currently use alcohol. She reports that she does not use drugs.  , daughter lives with them  Medications:  B-12, Fluticasone-Salmeterol, NIFEdipine CC, albuterol sulfate HFA, apixaban, atorvastatin, bisacodyl, bisoprolol, estradiol, nitrofurantoin (macrocrystal-monohydrate), ondansetron, pantoprazole, predniSONE, simethicone, and vitamin b complex    Allergies   Allergen Reactions   • Macrobid [Nitrofurantoin] Nausea And Vomiting   • Naproxen Unknown (See  Comments)   • Oxaprozin Hives   • Sulfa Antibiotics Unknown - Low Severity       Objective   Objective     Vital Signs:   Temp:  [96.5 °F (35.8 °C)-97.7 °F (36.5 °C)] 96.5 °F (35.8 °C)  Heart Rate:  [] 79  Resp:  [16-20] 16  BP: ()/(57-69) 100/66    Constitutional:  interactive and pleasant, not oriented, sleepy but tried to answer questions, generally weak and immobile  Eyes: clear sclerae, no conjunctival injection  HENT: NCAT, mucous membranes moist  Neck: no masses or lymphadenopathy, trachea midline  Respiratory: good breath sounds bilaterally, respirations unlabored  Cardiovascular: RRR, no murmurs appreciated, palpable peripheral pulses  Abdomen:  soft, no HSM or masses palpable, not tender or distended, obese  Musculoskeletal: No peripheral edema, clubbing or cyanosis- chronic OA changes  Neurologic: Oriented x 3,                       Strength symmetric in all extremities                     Cranial Nerves grossly intact, speech clear  Skin: No rashes or jaundice--- Bottom not seen (description reviewed with staff)  Psychiatric: aggreable      Result Review:  I have personally reviewed the results from the time of this admission   to 3/2/2023 00:02 EST and agree with these findings:  [x]  Laboratory  [x]  Microbiology  [x]  Radiology  [x]  EKG/Telemetry   []  Cardiology/Vascular   []  Pathology  [x]  Old records  []  Other:  Most notable findings include: wbc 12, LA 3, K 2.9, albumin 2.5      LAB RESULTS:      Lab 03/01/23 2106 03/01/23 2105   WBC  --  12.57*   HEMOGLOBIN  --  12.9   HEMATOCRIT  --  41.5   PLATELETS  --  164   NEUTROS ABS  --  10.47*   IMMATURE GRANS (ABS)  --  0.08*   LYMPHS ABS  --  1.44   MONOS ABS  --  0.50   EOS ABS  --  0.03   MCV  --  91.4   PROCALCITONIN  --  0.33*   LACTATE 3.2*  --          Lab 03/01/23 2106 03/01/23 2105   SODIUM  --  133*   POTASSIUM  --  2.9*   CHLORIDE  --  96*   CO2  --  22.0   ANION GAP  --  15.0   BUN  --  18   CREATININE  --  0.53*   EGFR   --  93.6   GLUCOSE  --  91   CALCIUM  --  8.2*   MAGNESIUM  --  1.9   HEMOGLOBIN A1C 5.70*  --    TSH  --  5.390*         Lab 03/01/23  2105   TOTAL PROTEIN 5.3*   ALBUMIN 2.5*   GLOBULIN 2.8   ALT (SGPT) 15   AST (SGOT) 25   BILIRUBIN 0.9   ALK PHOS 69         Lab 03/01/23  2105   HSTROP T 67*                 Brief Urine Lab Results  (Last result in the past 365 days)      Color   Clarity   Blood   Leuk Est   Nitrite   Protein   CREAT   Urine HCG        03/01/23 2327 Dark Yellow   Turbid   Trace   Moderate (2+)   Negative   100 mg/dL (2+)               COVID19   Date Value Ref Range Status   11/01/2022 Presumptive Negative Presumptive Negative - Ref. Range Final       No radiology results from the last 24 hrs    Results for orders placed during the hospital encounter of 10/18/22    Adult Transthoracic Echo Complete W/ Cont if Necessary Per Protocol    Interpretation Summary  •  Estimated left ventricular EF = 55% Left ventricular systolic function is normal.  •  Estimated right ventricular systolic pressure from tricuspid regurgitation is normal (<35 mmHg). Calculated right ventricular systolic pressure from tricuspid regurgitation is 28 mmHg.      The patient has a COVID HM Topic on their chart, and they are fully vaccinated.    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 female who  has a past medical history of Asthma, Chronic back pain, GERD (gastroesophageal reflux disease), Hyperlipidemia, Hypertension, Osteoarthritis, and Rheumatoid arthritis (HCC). she presented to Urology clinic for a catheter exchange today (after missing her prior appointment for scheduled  exchange). Patient was found to have diarrhea of up to 12 times a day with skin breakdown and possible fistulas near her bottom.    Diarrhea  - GI PCR, contact precautions    Skin break down  -WOC consult  -CT to eval for depth/fistula  --added vanc and rocephin    Chronic Ayala  -nurse to exchange today and culture    Afib  -rate controled, cont eliquis and bisoprolol for rate control    Hypertension  -now hypotensive- hold nifedipine    Asthma  -cont advair    HLP  -cont lipitor    RA  Chronic steroids with HO adrenal insufficiency  Now hypotensive-- possible volume related  -- consider iv steroids if BP does not respond to IVFs    Chronic debility    DVT prophylaxis:  Medical DVT prophylaxis orders are present.    CODE STATUS:WAS NO CPR last admission< Family not available, needs further discussion     Expected Discharge  Expected Discharge Date and Time     Expected Discharge Date Expected Discharge Time    Mar 6, 2023           Electronically signed by Judith Tracey MD 03/02/23 00:02 EST

## 2023-03-01 NOTE — PROGRESS NOTES
Follow Up Office Visit      Patient Name: Sol Lovelace  : 1940   MRN: [id number]     Chief Complaint:  No chief complaint on file.      Referring Provider: No ref. provider found    History of Present Illness: Sol Lovelace is a 82 y.o. female who presents today for routine gerber catheter exchange. Unfortunately, she did not follow up for scheduled appointment on  with Dr. Knight to discuss suprapubic tube placement. She did not follow up for her last office visit on  for gerber catheter exchange. She presents today with her daughter and another family member. There is purulent urine with sediment draining to gerber catheter. She is lethargic. Her daughter reports she has been having diarrhea anywhere from 10-12 times daily for several weeks. Upon exam for gerber catheter exchange, she was found to have multiple sacral/vaginal pressure wounds.     Her daughter reports she has had decreased PO intake.     Subjective      Review of System: Review of Systems   Constitutional:        Lethargic     Genitourinary:        Purulent/sediment yellow urine output draining to gerber catheter      I have reviewed the ROS documented by my clinical staff, I have updated appropriately and I agree. GLADIS Phillips    I have reviewed and the following portions of the patient's history were updated as appropriate: past family history, past medical history, past social history, past surgical history and problem list.    Medications:   No current facility-administered medications for this visit.  No current outpatient medications on file.    Facility-Administered Medications Ordered in Other Visits:   •  acetaminophen (TYLENOL) tablet 650 mg, 650 mg, Oral, Q4H PRN, Judith Tracey MD, 650 mg at 23  •  albuterol (PROVENTIL) nebulizer solution 0.083% 2.5 mg/3mL, 2.5 mg, Nebulization, Q6H PRN, Judith Tracey MD  •  apixaban (ELIQUIS) tablet 5 mg, 5 mg, Oral, Q12H, Judith Tracey MD, 5 mg at 23  0858  •  atorvastatin (LIPITOR) tablet 20 mg, 20 mg, Oral, Nightly, Judith Tracey MD, 20 mg at 03/01/23 2011  •  bisoprolol (ZEBeta) tablet 10 mg, 10 mg, Oral, Q24H, Judith Tracey MD  •  budesonide-formoterol (SYMBICORT) 160-4.5 MCG/ACT inhaler 2 puff, 2 puff, Inhalation, BID - RT, Judith Tracey MD, 2 puff at 03/02/23 0913  •  Calcium Replacement - Initiate Nurse / BPA Driven Protocol, , Does not apply, PRN, Zhen, Raina, APRN  •  cefTRIAXone (ROCEPHIN) 2 g/100 mL 0.9% NS IVPB (MBP), 2 g, Intravenous, Q24H, Judith Tracey MD, 2 g at 03/01/23 2302  •  HYDROcodone-acetaminophen (NORCO) 5-325 MG per tablet 1 tablet, 1 tablet, Oral, Q4H PRN, Judith Tracey MD  •  lactated ringers infusion, 75 mL/hr, Intravenous, Continuous, Zhen, Raina, APRN, Last Rate: 75 mL/hr at 03/02/23 0418, 75 mL/hr at 03/02/23 0418  •  Magnesium Standard Dose Replacement - Initiate Nurse / BPA Driven Protocol, , Does not apply, PRN, Zhen, Raina, APRN  •  Pharmacy to dose vancomycin, , Does not apply, Continuous PRN, Judith Tracey MD  •  Phosphorus Replacement - Initiate Nurse / BPA Driven Protocol, , Does not apply, PRN, Zhen, Raina, APRN  •  Potassium Replacement - Initiate Nurse / BPA Driven Protocol, , Does not apply, PRN, Zhen, Raina, APRN  •  [START ON 3/3/2023] predniSONE (DELTASONE) tablet 10 mg, 10 mg, Oral, Daily With Breakfast, Selene Jefferson MD  •  sodium chloride 0.9 % flush 10 mL, 10 mL, Intravenous, Q12H, Judith Tracey MD, 10 mL at 03/02/23 0903  •  sodium chloride 0.9 % flush 10 mL, 10 mL, Intravenous, PRN, Judith Tracey MD  •  sodium chloride 0.9 % infusion 40 mL, 40 mL, Intravenous, PRN, Judith Tracey MD  •  vancomycin (VANCOCIN) 1000 mg/200 mL dextrose 5% IVPB, 10.3 mg/kg, Intravenous, Q12H **AND** [START ON 3/3/2023] Vancomycin, Trough, , , Timed, Josafat Lujan, PharmD    Allergies:   Allergies   Allergen Reactions   • Macrobid [Nitrofurantoin] Nausea And Vomiting    • Naproxen Unknown (See Comments)   • Oxaprozin Hives   • Sulfa Antibiotics Unknown - Low Severity       Objective     Physical Exam:   Vital Signs: There were no vitals filed for this visit.  There is no height or weight on file to calculate BMI.     Physical Exam  Vitals and nursing note reviewed.   Constitutional:       Appearance: She is obese. She is ill-appearing.   HENT:      Head: Normocephalic and atraumatic.      Nose: Nose normal.      Mouth/Throat:      Mouth: Mucous membranes are moist.   Eyes:      Pupils: Pupils are equal, round, and reactive to light.   Pulmonary:      Effort: Pulmonary effort is normal.   Abdominal:      General: Abdomen is flat.      Palpations: Abdomen is soft.   Genitourinary:     Comments: Pressure wounds at sacram/vagina with slough. Ayala catheter draining purulent yellow urine with sediment.  Musculoskeletal:      Cervical back: Normal range of motion.      Comments: Wheelchair to ambulate   Skin:     General: Skin is warm and dry.      Capillary Refill: Capillary refill takes less than 2 seconds.   Neurological:      Comments: lethargic   Psychiatric:         Mood and Affect: Mood normal.         Labs:   Brief Urine Lab Results  (Last result in the past 365 days)      Color   Clarity   Blood   Leuk Est   Nitrite   Protein   CREAT   Urine HCG        03/01/23 2327 Dark Yellow   Turbid   Trace   Moderate (2+)   Negative   100 mg/dL (2+)                 Urine Culture    Urine Culture 10/18/22 10/18/22 1/23/23 3/1/23    1742 1742     Urine Culture >100,000 CFU/mL Escherichia coli (A) >100,000 CFU/mL Proteus mirabilis (A) >100,000 CFU/mL Escherichia coli (A) Culture in progress (P)   (A) Abnormal value               Lab Results   Component Value Date    GLUCOSE 93 03/02/2023    CALCIUM 7.5 (L) 03/02/2023     (L) 03/02/2023    K 3.1 (L) 03/02/2023    CO2 20.0 (L) 03/02/2023    CL 97 (L) 03/02/2023    BUN 18 03/02/2023    CREATININE 0.32 (L) 03/02/2023    BCR 56.3 (H)  03/02/2023    ANIONGAP 15.0 03/02/2023       Lab Results   Component Value Date    WBC 11.58 (H) 03/02/2023    HGB 12.5 03/02/2023    HCT 39.4 03/02/2023    MCV 92.5 03/02/2023     03/02/2023       Images:   No Images in the past 120 days found..    Measures:   Tobacco:   Sol Lovelace  reports that she has never smoked. She does not have any smokeless tobacco history on file.. I  Urine Incontinence: Patient reports that she is not currently experiencing any symptoms of urinary incontinence.    Assessment / Plan      Assessment/Plan:   82 y.o. female who presented today for routine gerber catheter exchange. Given significant sacral wounds, lethargy, diarrhea and decreased PO intake we will plan for Ms. Lovelace to present to [facility] as direct admit. I discussed with Hospitalist regarding patients current presentation at our office today.     Patient and family are agreeable with plan of care. Gerber catheter was not exchanged today, will plan to exchange while inpatient at [facility] and obtain urine culture at that time.     Diagnoses and all orders for this visit:    1. Chronic indwelling Gerber catheter (Primary)    2. Acute urinary retention         Follow Up:   Return for Once discharged from Hospital.    I spent approximately 50 minutes providing clinical care for this patient; including review of patient's chart and provider documentation, face to face time spent with patient in examination room (obtaining history, performing physical exam, discussing diagnosis and management options), placing orders, and completing patient documentation.     GLADIS Phillips  [facility] [place]

## 2023-03-02 ENCOUNTER — APPOINTMENT (OUTPATIENT)
Dept: CT IMAGING | Facility: HOSPITAL | Age: 83
DRG: 641 | End: 2023-03-02
Payer: MEDICARE

## 2023-03-02 ENCOUNTER — APPOINTMENT (OUTPATIENT)
Dept: GENERAL RADIOLOGY | Facility: HOSPITAL | Age: 83
DRG: 641 | End: 2023-03-02
Payer: MEDICARE

## 2023-03-02 LAB
ADV 40+41 DNA STL QL NAA+NON-PROBE: NOT DETECTED
ANION GAP SERPL CALCULATED.3IONS-SCNC: 15 MMOL/L (ref 5–15)
ASTRO TYP 1-8 RNA STL QL NAA+NON-PROBE: NOT DETECTED
BASOPHILS # BLD AUTO: 0.04 10*3/MM3 (ref 0–0.2)
BASOPHILS NFR BLD AUTO: 0.3 % (ref 0–1.5)
BUN SERPL-MCNC: 18 MG/DL (ref 8–23)
BUN/CREAT SERPL: 56.3 (ref 7–25)
C CAYETANENSIS DNA STL QL NAA+NON-PROBE: NOT DETECTED
C COLI+JEJ+UPSA DNA STL QL NAA+NON-PROBE: NOT DETECTED
C DIFF GDH + TOXINS A+B STL QL IA.RAPID: NEGATIVE
C DIFF TOX GENS STL QL NAA+PROBE: DETECTED
CALCIUM SPEC-SCNC: 7.5 MG/DL (ref 8.6–10.5)
CHLORIDE SERPL-SCNC: 97 MMOL/L (ref 98–107)
CO2 SERPL-SCNC: 20 MMOL/L (ref 22–29)
CREAT SERPL-MCNC: 0.32 MG/DL (ref 0.57–1)
CRYPTOSP DNA STL QL NAA+NON-PROBE: NOT DETECTED
D-LACTATE SERPL-SCNC: 1.5 MMOL/L (ref 0.5–2)
D-LACTATE SERPL-SCNC: 2.2 MMOL/L (ref 0.5–2)
D-LACTATE SERPL-SCNC: 2.9 MMOL/L (ref 0.5–2)
D-LACTATE SERPL-SCNC: 3.1 MMOL/L (ref 0.5–2)
D-LACTATE SERPL-SCNC: 3.3 MMOL/L (ref 0.5–2)
DEPRECATED RDW RBC AUTO: 57.1 FL (ref 37–54)
E HISTOLYT DNA STL QL NAA+NON-PROBE: NOT DETECTED
EAEC PAA PLAS AGGR+AATA ST NAA+NON-PRB: NOT DETECTED
EC STX1+STX2 GENES STL QL NAA+NON-PROBE: NOT DETECTED
EGFRCR SERPLBLD CKD-EPI 2021: 105.7 ML/MIN/1.73
EOSINOPHIL # BLD AUTO: 0.11 10*3/MM3 (ref 0–0.4)
EOSINOPHIL NFR BLD AUTO: 0.9 % (ref 0.3–6.2)
EPEC EAE GENE STL QL NAA+NON-PROBE: NOT DETECTED
ERYTHROCYTE [DISTWIDTH] IN BLOOD BY AUTOMATED COUNT: 17.2 % (ref 12.3–15.4)
ETEC LTA+ST1A+ST1B TOX ST NAA+NON-PROBE: NOT DETECTED
G LAMBLIA DNA STL QL NAA+NON-PROBE: NOT DETECTED
GEN 5 2HR TROPONIN T REFLEX: 48 NG/L
GLUCOSE SERPL-MCNC: 93 MG/DL (ref 65–99)
HCT VFR BLD AUTO: 39.4 % (ref 34–46.6)
HGB BLD-MCNC: 12.5 G/DL (ref 12–15.9)
IMM GRANULOCYTES # BLD AUTO: 0.08 10*3/MM3 (ref 0–0.05)
IMM GRANULOCYTES NFR BLD AUTO: 0.7 % (ref 0–0.5)
LYMPHOCYTES # BLD AUTO: 1.52 10*3/MM3 (ref 0.7–3.1)
LYMPHOCYTES NFR BLD AUTO: 13.1 % (ref 19.6–45.3)
MAGNESIUM SERPL-MCNC: 1.4 MG/DL (ref 1.6–2.4)
MAGNESIUM SERPL-MCNC: 1.6 MG/DL (ref 1.6–2.4)
MCH RBC QN AUTO: 29.3 PG (ref 26.6–33)
MCHC RBC AUTO-ENTMCNC: 31.7 G/DL (ref 31.5–35.7)
MCV RBC AUTO: 92.5 FL (ref 79–97)
MONOCYTES # BLD AUTO: 0.52 10*3/MM3 (ref 0.1–0.9)
MONOCYTES NFR BLD AUTO: 4.5 % (ref 5–12)
MRSA DNA SPEC QL NAA+PROBE: POSITIVE
NEUTROPHILS NFR BLD AUTO: 80.5 % (ref 42.7–76)
NEUTROPHILS NFR BLD AUTO: 9.31 10*3/MM3 (ref 1.7–7)
NOROVIRUS GI+II RNA STL QL NAA+NON-PROBE: NOT DETECTED
NRBC BLD AUTO-RTO: 0 /100 WBC (ref 0–0.2)
P SHIGELLOIDES DNA STL QL NAA+NON-PROBE: NOT DETECTED
PLATELET # BLD AUTO: 153 10*3/MM3 (ref 140–450)
PMV BLD AUTO: 10 FL (ref 6–12)
POTASSIUM SERPL-SCNC: 3.1 MMOL/L (ref 3.5–5.2)
POTASSIUM SERPL-SCNC: 4.1 MMOL/L (ref 3.5–5.2)
QT INTERVAL: 396 MS
QTC INTERVAL: 508 MS
RBC # BLD AUTO: 4.26 10*6/MM3 (ref 3.77–5.28)
RVA RNA STL QL NAA+NON-PROBE: NOT DETECTED
S ENT+BONG DNA STL QL NAA+NON-PROBE: NOT DETECTED
SAPO I+II+IV+V RNA STL QL NAA+NON-PROBE: NOT DETECTED
SHIGELLA SP+EIEC IPAH ST NAA+NON-PROBE: NOT DETECTED
SODIUM SERPL-SCNC: 132 MMOL/L (ref 136–145)
TROPONIN T DELTA: -19 NG/L
V CHOL+PARA+VUL DNA STL QL NAA+NON-PROBE: NOT DETECTED
V CHOLERAE DNA STL QL NAA+NON-PROBE: NOT DETECTED
WBC NRBC COR # BLD: 11.58 10*3/MM3 (ref 3.4–10.8)
Y ENTEROCOL DNA STL QL NAA+NON-PROBE: NOT DETECTED

## 2023-03-02 PROCEDURE — 87493 C DIFF AMPLIFIED PROBE: CPT | Performed by: INTERNAL MEDICINE

## 2023-03-02 PROCEDURE — 25010000002 VANCOMYCIN PER 500 MG

## 2023-03-02 PROCEDURE — 83735 ASSAY OF MAGNESIUM: CPT | Performed by: INTERNAL MEDICINE

## 2023-03-02 PROCEDURE — 25510000001 IOPAMIDOL 61 % SOLUTION: Performed by: INTERNAL MEDICINE

## 2023-03-02 PROCEDURE — 85025 COMPLETE CBC W/AUTO DIFF WBC: CPT | Performed by: INTERNAL MEDICINE

## 2023-03-02 PROCEDURE — 97110 THERAPEUTIC EXERCISES: CPT

## 2023-03-02 PROCEDURE — 25010000002 MAGNESIUM SULFATE 2 GM/50ML SOLUTION: Performed by: NURSE PRACTITIONER

## 2023-03-02 PROCEDURE — 83605 ASSAY OF LACTIC ACID: CPT | Performed by: INTERNAL MEDICINE

## 2023-03-02 PROCEDURE — 71045 X-RAY EXAM CHEST 1 VIEW: CPT

## 2023-03-02 PROCEDURE — 74178 CT ABD&PLV WO CNTR FLWD CNTR: CPT

## 2023-03-02 PROCEDURE — 63710000001 PREDNISONE PER 1 MG: Performed by: INTERNAL MEDICINE

## 2023-03-02 PROCEDURE — 94640 AIRWAY INHALATION TREATMENT: CPT

## 2023-03-02 PROCEDURE — 84132 ASSAY OF SERUM POTASSIUM: CPT | Performed by: INTERNAL MEDICINE

## 2023-03-02 PROCEDURE — 83735 ASSAY OF MAGNESIUM: CPT | Performed by: NURSE PRACTITIONER

## 2023-03-02 PROCEDURE — 94799 UNLISTED PULMONARY SVC/PX: CPT

## 2023-03-02 PROCEDURE — 99232 SBSQ HOSP IP/OBS MODERATE 35: CPT | Performed by: INTERNAL MEDICINE

## 2023-03-02 PROCEDURE — 25010000002 CEFTRIAXONE PER 250 MG: Performed by: INTERNAL MEDICINE

## 2023-03-02 PROCEDURE — 25010000002 VANCOMYCIN 10 G RECONSTITUTED SOLUTION

## 2023-03-02 PROCEDURE — 87449 NOS EACH ORGANISM AG IA: CPT | Performed by: INTERNAL MEDICINE

## 2023-03-02 PROCEDURE — 99497 ADVNCD CARE PLAN 30 MIN: CPT | Performed by: INTERNAL MEDICINE

## 2023-03-02 PROCEDURE — 97161 PT EVAL LOW COMPLEX 20 MIN: CPT

## 2023-03-02 PROCEDURE — 94664 DEMO&/EVAL PT USE INHALER: CPT

## 2023-03-02 PROCEDURE — 80048 BASIC METABOLIC PNL TOTAL CA: CPT | Performed by: INTERNAL MEDICINE

## 2023-03-02 PROCEDURE — 84484 ASSAY OF TROPONIN QUANT: CPT | Performed by: INTERNAL MEDICINE

## 2023-03-02 PROCEDURE — 97166 OT EVAL MOD COMPLEX 45 MIN: CPT

## 2023-03-02 PROCEDURE — 87507 IADNA-DNA/RNA PROBE TQ 12-25: CPT | Performed by: INTERNAL MEDICINE

## 2023-03-02 PROCEDURE — 87641 MR-STAPH DNA AMP PROBE: CPT | Performed by: INTERNAL MEDICINE

## 2023-03-02 PROCEDURE — 99222 1ST HOSP IP/OBS MODERATE 55: CPT | Performed by: NURSE PRACTITIONER

## 2023-03-02 RX ORDER — MAGNESIUM SULFATE HEPTAHYDRATE 40 MG/ML
2 INJECTION, SOLUTION INTRAVENOUS
Status: COMPLETED | OUTPATIENT
Start: 2023-03-02 | End: 2023-03-02

## 2023-03-02 RX ORDER — SODIUM CHLORIDE, SODIUM LACTATE, POTASSIUM CHLORIDE, CALCIUM CHLORIDE 600; 310; 30; 20 MG/100ML; MG/100ML; MG/100ML; MG/100ML
75 INJECTION, SOLUTION INTRAVENOUS CONTINUOUS
Status: ACTIVE | OUTPATIENT
Start: 2023-03-02 | End: 2023-03-02

## 2023-03-02 RX ORDER — PREDNISONE 10 MG/1
10 TABLET ORAL
Status: DISCONTINUED | OUTPATIENT
Start: 2023-03-03 | End: 2023-03-09 | Stop reason: HOSPADM

## 2023-03-02 RX ORDER — PREDNISONE 20 MG/1
20 TABLET ORAL
Status: DISCONTINUED | OUTPATIENT
Start: 2023-03-02 | End: 2023-03-02

## 2023-03-02 RX ORDER — SODIUM CHLORIDE, SODIUM LACTATE, POTASSIUM CHLORIDE, CALCIUM CHLORIDE 600; 310; 30; 20 MG/100ML; MG/100ML; MG/100ML; MG/100ML
75 INJECTION, SOLUTION INTRAVENOUS CONTINUOUS
Status: ACTIVE | OUTPATIENT
Start: 2023-03-02 | End: 2023-03-03

## 2023-03-02 RX ADMIN — ATORVASTATIN CALCIUM 20 MG: 20 TABLET, FILM COATED ORAL at 20:53

## 2023-03-02 RX ADMIN — SODIUM CHLORIDE, POTASSIUM CHLORIDE, SODIUM LACTATE AND CALCIUM CHLORIDE 75 ML/HR: 600; 310; 30; 20 INJECTION, SOLUTION INTRAVENOUS at 20:53

## 2023-03-02 RX ADMIN — IOPAMIDOL 95 ML: 612 INJECTION, SOLUTION INTRAVENOUS at 15:16

## 2023-03-02 RX ADMIN — Medication 1 APPLICATION: at 17:57

## 2023-03-02 RX ADMIN — POTASSIUM CHLORIDE 40 MEQ: 750 CAPSULE, EXTENDED RELEASE ORAL at 03:36

## 2023-03-02 RX ADMIN — Medication 10 ML: at 20:53

## 2023-03-02 RX ADMIN — HYDROCODONE BITARTRATE AND ACETAMINOPHEN 1 TABLET: 5; 325 TABLET ORAL at 20:52

## 2023-03-02 RX ADMIN — MAGNESIUM SULFATE HEPTAHYDRATE 2 G: 40 INJECTION, SOLUTION INTRAVENOUS at 08:58

## 2023-03-02 RX ADMIN — BUDESONIDE AND FORMOTEROL FUMARATE DIHYDRATE 2 PUFF: 160; 4.5 AEROSOL RESPIRATORY (INHALATION) at 19:58

## 2023-03-02 RX ADMIN — SODIUM CHLORIDE, POTASSIUM CHLORIDE, SODIUM LACTATE AND CALCIUM CHLORIDE 75 ML/HR: 600; 310; 30; 20 INJECTION, SOLUTION INTRAVENOUS at 04:18

## 2023-03-02 RX ADMIN — APIXABAN 5 MG: 5 TABLET, FILM COATED ORAL at 08:58

## 2023-03-02 RX ADMIN — PREDNISONE 20 MG: 20 TABLET ORAL at 08:58

## 2023-03-02 RX ADMIN — CEFTRIAXONE 2 G: 2 INJECTION, POWDER, FOR SOLUTION INTRAMUSCULAR; INTRAVENOUS at 20:53

## 2023-03-02 RX ADMIN — Medication 10 ML: at 09:03

## 2023-03-02 RX ADMIN — BUDESONIDE AND FORMOTEROL FUMARATE DIHYDRATE 2 PUFF: 160; 4.5 AEROSOL RESPIRATORY (INHALATION) at 09:13

## 2023-03-02 RX ADMIN — APIXABAN 5 MG: 5 TABLET, FILM COATED ORAL at 20:53

## 2023-03-02 RX ADMIN — VANCOMYCIN HYDROCHLORIDE 2000 MG: 10 INJECTION, POWDER, LYOPHILIZED, FOR SOLUTION INTRAVENOUS at 00:04

## 2023-03-02 RX ADMIN — MAGNESIUM SULFATE HEPTAHYDRATE 2 G: 40 INJECTION, SOLUTION INTRAVENOUS at 04:18

## 2023-03-02 RX ADMIN — MAGNESIUM SULFATE HEPTAHYDRATE 2 G: 40 INJECTION, SOLUTION INTRAVENOUS at 06:32

## 2023-03-02 RX ADMIN — VANCOMYCIN HYDROCHLORIDE 1000 MG: 1 INJECTION, SOLUTION INTRAVENOUS at 11:49

## 2023-03-02 RX ADMIN — POTASSIUM CHLORIDE 40 MEQ: 750 CAPSULE, EXTENDED RELEASE ORAL at 08:57

## 2023-03-02 NOTE — PLAN OF CARE
Pt vss, on RA, disoriented to time, afib on monitor. Large liquid green BM, stool sample collected and sent. Wound care to bottom performed. Pt has no complaints at this time.

## 2023-03-02 NOTE — PLAN OF CARE
Goal Outcome Evaluation:  Plan of Care Reviewed With: patient           Outcome Evaluation: Pt presents w/ increased pain, decreased skin integrity, generalized weakness, decreased functional endurance, and balance deficits limiting her ADL independence. Pt would benefit from continued skilled IPOT services to address current functional deficits. Rec SNF at d/c.

## 2023-03-02 NOTE — PLAN OF CARE
Goal Outcome Evaluation:  Plan of Care Reviewed With: patient  Progress: improving  Outcome Evaluation: Patient is A&Ox3, disoriented to time. Afib on tele with rate controlled. On room air. PRN tylenol given for pain. Ayala changed per protocol. Potassium replacement and mag replacement started. IV antibiotics given. LR going at 75mL/hr. Skin interventions in place. Continue plan of care.

## 2023-03-02 NOTE — THERAPY EVALUATION
Patient Name: Sol Lovelace  : 1940    MRN: [id number]                              Today's Date: 3/2/2023       Admit Date: 3/1/2023    Visit Dx: No diagnosis found.  Patient Active Problem List   Diagnosis   • E. coli UTI   • Hyponatremia   • Elevated liver enzymes   • Hypoalbuminemia   • Leukocytosis   • Proteinuria   • Paroxysmal atrial fibrillation (HCC)   • Essential hypertension   • Acute urinary retention   • Current chronic use of systemic steroids   • Adrenal insufficiency due to corticosteroid withdrawal ([facility])   • Severe obesity (BMI 35.0-35.9 with comorbidity) ([facility])   • Moderate asthma with exacerbation   • Diarrhea   • Buttock wound   • Chronic indwelling Ayala catheter     Past Medical History:   Diagnosis Date   • Asthma    • Chronic back pain    • GERD (gastroesophageal reflux disease)    • Hyperlipidemia    • Hypertension    • Osteoarthritis    • Rheumatoid arthritis ([facility])      Past Surgical History:   Procedure Laterality Date   • LAPAROSCOPIC VAGINAL HYSTERECTOMY      BSO, bladder suspension      General Information     Row Name 23          Physical Therapy Time and Intention    Document Type evaluation  -KR     Mode of Treatment physical therapy  -KR     Row Name 23          General Information    Patient Profile Reviewed yes  -KR     Prior Level of Function dependent:;w/c or scooter;max assist:;transfer;bed mobility;ADLs  pt sleeps in recliner chair. Daughter rolls her in the chair to clean up BM. Pt has caregiver who comes to assist transferring pt to  for doctor's appts.  -KR     Existing Precautions/Restrictions fall  -KR     Barriers to Rehab medically complex;previous functional deficit;physical barrier  -KR     Row Name 23          Living Environment    People in Home child(tre), adult;spouse  -KR     Row Name 23          Home Main Entrance    Number of Stairs, Main Entrance four;other (see comments)  ramp access  -KR     Stair  Railings, Main Entrance railings on both sides of stairs  -KR     Row Name 03/02/23 0822          Cognition    Orientation Status (Cognition) oriented x 3  -KR     Row Name 03/02/23 0822          Safety Issues, Functional Mobility    Safety Issues Affecting Function (Mobility) friction/shear risk;insight into deficits/self-awareness;judgment;safety precaution awareness;safety precautions follow-through/compliance;sequencing abilities  -KR     Impairments Affecting Function (Mobility) balance;endurance/activity tolerance;pain;postural/trunk control;range of motion (ROM);sensation/sensory awareness;strength  -KR     Comment, Safety Issues/Impairments (Mobility) fear of falling  -KR           User Key  (r) = Recorded By, (t) = Taken By, (c) = Cosigned By    Initials Name Provider Type    Kristel Macedo PT Physical Therapist               Mobility     Row Name 03/02/23 0822          Bed Mobility    Bed Mobility rolling right  -KR     Rolling Right [place] (Bed Mobility) maximum assist (25% patient effort);2 person assist  -KR     Assistive Device (Bed Mobility) bed rails;draw sheet  -KR     Comment, (Bed Mobility) VCs for hand placement and sequencing.  -KR     Row Name 03/02/23 0822          Bed-Chair Transfer    Bed-Chair [place] (Transfers) dependent (less than 25% patient effort);2 person assist  -KR     Assistive Device (Bed-Chair Transfers) lift device  -KR     Row Name 03/02/23 0822          Sit-Stand Transfer    Comment, (Sit-Stand Transfer) unsafe to attempt d/t weakness, poor sitting balance, and fear of falling.  -KR           User Key  (r) = Recorded By, (t) = Taken By, (c) = Cosigned By    Initials Name Provider Type    Kristel Macedo PT Physical Therapist               Obj/Interventions     Row Name 03/02/23 0822          Range of Motion Comprehensive    General Range of Motion lower extremity range of motion deficits identified  -KR     Comment, General Range of Motion decreased B knee  extension ROM.  -     Row Name 03/02/23 0822          Strength Comprehensive (MMT)    General Manual Muscle Testing (MMT) Assessment lower extremity strength deficits identified  -KR     Comment, General Manual Muscle Testing (MMT) Assessment R hip flexion 2-/5, L hip flexion 1/5, R hip abduction/adduction 2/5, L hip abduction/adduction 1/5, R knee extension 2/5, L knee extension 2-/5, B knee flexion 2-/5, B ankle DF/PF 2/5.  -KR     Row Name 03/02/23 0822          Motor Skills    Therapeutic Exercise knee  -KR     Row Name 03/02/23 0822          Knee (Therapeutic Exercise)    Knee (Therapeutic Exercise) AAROM (active assistive range of motion)  -KR     Knee AAROM (Therapeutic Exercise) 5 repetitions;bilateral;extension;flexion  -KR     Row Name 03/02/23 0822          Balance    Balance Assessment sitting static balance;sitting dynamic balance  -KR     Static Sitting Balance maximum assist;1-person assist  maxA progressing to periods of CGA following hand placement anterior on armrests.  -KR     Dynamic Sitting Balance maximum assist;1-person assist  -KR     Position, Sitting Balance unsupported;sitting in chair  -KR     Row Name 03/02/23 0822          Sensory Assessment (Somatosensory)    Sensory Assessment (Somatosensory) bilateral LE  -KR     Bilateral LE Sensory Assessment light touch awareness;impaired;light touch localization;proprioception;intact  -KR           User Key  (r) = Recorded By, (t) = Taken By, (c) = Cosigned By    Initials Name Provider Type    Kristel Macedo, PT Physical Therapist               Goals/Plan     Row Name 03/02/23 0822          Bed Mobility Goal 1 (PT)    Activity/Assistive Device (Bed Mobility Goal 1, PT) rolling to left;rolling to right;supine to sit  -KR     [place] Level/Cues Needed (Bed Mobility Goal 1, PT) maximum assist (25-49% patient effort)  -KR     Time Frame (Bed Mobility Goal 1, PT) long term goal (LTG);2 weeks  -     Row Name 03/02/23 0822          Transfer  Goal 1 (PT)    Activity/Assistive Device (Transfer Goal 1, PT) sit-to-stand/stand-to-sit;bed-to-chair/chair-to-bed;walker, rolling  -KR     [place] Level/Cues Needed (Transfer Goal 1, PT) maximum assist (25-49% patient effort)  -KR     Time Frame (Transfer Goal 1, PT) long term goal (LTG);2 weeks  -KR     Row Name 03/02/23 0822          Problem Specific Goal 1 (PT)    Problem Specific Goal 1 (PT) Pt will demonstrate improved core/trunk strength by sitting unsupported EOC for 5 minutes with supervision.  -KR     Time Frame (Problem Specific Goal 1, PT) long-term goal (LTG);2 weeks  -KR     Row Name 03/02/23 0822          Therapy Assessment/Plan (PT)    Planned Therapy Interventions (PT) balance training;bed mobility training;home exercise program;gait training;manual therapy techniques;postural re-education;patient/family education;neuromuscular re-education;ROM (range of motion);motor coordination training;strengthening;stretching;transfer training  -KR           User Key  (r) = Recorded By, (t) = Taken By, (c) = Cosigned By    Initials Name Provider Type    KR Kristel Albright, PT Physical Therapist               Clinical Impression     Row Name 03/02/23 0822          Pain    Pretreatment Pain Rating 9/10  -KR     Posttreatment Pain Rating 9/10  -KR     Pain Location - buttock  -KR     Pain Intervention(s) Repositioned  -KR     Row Name 03/02/23 0822          Plan of Care Review    Plan of Care Reviewed With patient  -KR     Outcome Evaluation Pt demonstrates decreased strength, balance, and endurance below baseline contributing to impairments in bed mobility, transfers, and overall fxnl mobility. Pt will benefit from PT to address aforementioned deficits and return to PLOF.  -KR     Row Name 03/02/23 0822          Therapy Assessment/Plan (PT)    Rehab Potential (PT) fair, will monitor progress closely  -KR     Criteria for Skilled Interventions Met (PT) yes;skilled treatment is necessary  -KR     Therapy  Frequency (PT) daily  -KR     Row Name 03/02/23 0822          Vital Signs    Post Systolic BP Rehab 92  -KR     Post Treatment Diastolic BP 64  -KR     Posttreatment Heart Rate (beats/min) 125  -KR     Post SpO2 (%) 93  -KR     O2 Delivery Post Treatment room air  -KR     Pre Patient Position Supine  -KR     Intra Patient Position Sitting  -KR     Post Patient Position Sitting  -KR     Row Name 03/02/23 0822          Positioning and Restraints    Pre-Treatment Position in bed  -KR     Post Treatment Position chair  -KR     In Chair with nsg;with OT;reclined;exit alarm on;waffle boot/both;waffle cushion;on mechanical lift sling  -KR           User Key  (r) = Recorded By, (t) = Taken By, (c) = Cosigned By    Initials Name Provider Type    Kristel Macedo PT Physical Therapist               Outcome Measures     Row Name 03/02/23 0914          How much help from another person do you currently need...    Turning from your back to your side while in flat bed without using bedrails? 1  -KR     Moving from lying on back to sitting on the side of a flat bed without bedrails? 1  -KR     Moving to and from a bed to a chair (including a wheelchair)? 1  -KR     Standing up from a chair using your arms (e.g., wheelchair, bedside chair)? 1  -KR     Climbing 3-5 steps with a railing? 1  -KR     To walk in hospital room? 1  -KR     AM-PAC 6 Clicks Score (PT) 6  -KR     Highest level of mobility 2 --> Bed activities/dependent transfer  -KR     Row Name 03/02/23 0914          Functional Assessment    Outcome Measure Options AM-PAC 6 Clicks Basic Mobility (PT)  -KR           User Key  (r) = Recorded By, (t) = Taken By, (c) = Cosigned By    Initials Name Provider Type    Kristel Macedo PT Physical Therapist                             Physical Therapy Education     Title: PT OT SLP Therapies (In Progress)     Topic: Physical Therapy (Done)     Point: Mobility training (Done)     Learning Progress Summary           Patient  Acceptance, E, VU by KR at 3/2/2023 0914                   Point: Home exercise program (Done)     Learning Progress Summary           Patient Acceptance, E, VU by KR at 3/2/2023 0914                   Point: Body mechanics (Done)     Learning Progress Summary           Patient Acceptance, E, VU by KR at 3/2/2023 0914                   Point: Precautions (Done)     Learning Progress Summary           Patient Acceptance, E, VU by KR at 3/2/2023 0914                               User Key     Initials Effective Dates Name Provider Type Discipline     12/30/22 -  Kristel Albright PT Physical Therapist PT              PT Recommendation and Plan  Planned Therapy Interventions (PT): balance training, bed mobility training, home exercise program, gait training, manual therapy techniques, postural re-education, patient/family education, neuromuscular re-education, ROM (range of motion), motor coordination training, strengthening, stretching, transfer training  Plan of Care Reviewed With: patient  Outcome Evaluation: Pt demonstrates decreased strength, balance, and endurance below baseline contributing to impairments in bed mobility, transfers, and overall fxnl mobility. Pt will benefit from PT to address aforementioned deficits and return to PLOF.     Time Calculation:    PT Charges     Row Name 03/02/23 0822             Time Calculation    Start Time 0822  -KR      PT Received On 03/02/23  -KR      PT Goal Re-Cert Due Date 03/12/23  -KR         Untimed Charges    PT Eval/Re-eval Minutes 50  -KR         Total Minutes    Untimed Charges Total Minutes 50  -KR       Total Minutes 50  -KR            User Key  (r) = Recorded By, (t) = Taken By, (c) = Cosigned By    Initials Name Provider Type    KR Kristel Albrihgt PT Physical Therapist              Therapy Charges for Today     Code Description Service Date Service Provider Modifiers Qty    82490253438 HC PT EVAL LOW COMPLEXITY 4 3/2/2023 Kristel Albright PT GP 1          PT  G-Codes  Outcome Measure Options: AM-PAC 6 Clicks Basic Mobility (PT)  AM-PAC 6 Clicks Score (PT): 6  PT Discharge Summary  Anticipated Discharge Disposition (PT): skilled nursing facility    Kristel Albright, PT  3/2/2023

## 2023-03-02 NOTE — PROGRESS NOTES
"Pharmacy Consult-Vancomycin Dosing  Sol Lovelace is a  82 y.o. female receiving vancomycin therapy.     Indication: complicated sacral wound infection  Consulting Provider: hospitalist  ID Consult: Yes    Goal AUC: 400 - 600 mg/L*hr    Current Antimicrobial Therapy  Anti-Infectives (From admission, onward)      Ordered     Dose/Rate Route Frequency Start Stop    03/01/23 2250  vancomycin (VANCOCIN) 1000 mg/200 mL dextrose 5% IVPB        Ordering Provider: Josafat Lujan, PharmD   See [facility] for full Linked Orders Report.    10.3 mg/kg × 97.1 kg  over 60 Minutes Intravenous Every 12 Hours Scheduled 03/02/23 1200 03/06/23 2359    03/01/23 2250  vancomycin IVPB 2000 mg in 0.9% Sodium Chloride (premix) 500 mL        Ordering Provider: Josafat Lujan PharmD    20 mg/kg × 97.1 kg  over 120 Minutes Intravenous Once 03/01/23 2345      03/01/23 2235  cefTRIAXone (ROCEPHIN) 2 g/100 mL 0.9% NS IVPB (MBP)        Ordering Provider: Judith Tracey MD    2 g  over 30 Minutes Intravenous Every 24 Hours Scheduled 03/01/23 2245 03/06/23 2159    03/01/23 2235  Pharmacy to dose vancomycin        Ordering Provider: Judith Tracey MD     Does not apply Continuous PRN 03/01/23 2234 03/06/23 2233            Allergies  Allergies as of 03/01/2023 - Reviewed 03/01/2023   Allergen Reaction Noted    Macrobid [nitrofurantoin] Nausea And Vomiting 10/18/2022    Naproxen Unknown (See Comments) 01/04/2023    Oxaprozin Hives 03/07/2008    Sulfa antibiotics Unknown - Low Severity 10/18/2022       Labs  Results from last 7 days   Lab Units 03/01/23  2105   BUN mg/dL 18   CREATININE mg/dL 0.53*     Results from last 7 days   Lab Units 03/01/23  2105   WBC 10*3/mm3 12.57*       Evaluation of Dosing  Last Dose Received in the ED/Outside Facility:               N/A  Is Patient on Dialysis or Renal Replacement:               No    Ht - 160 cm (63\")  Wt - 97.1 kg (214 lb)    Estimated Creatinine Clearance: 94 mL/min (A) (by C-G formula " based on SCr of 0.53 mg/dL (L)).  Intake & Output (last 3 days)       None            Microbiology and Radiology  Microbiology Results (last 10 days)       ** No results found for the last 240 hours. **            Reported Vancomycin Levels                   [employer] AUC Calculation:  Current AUC:   -- mg/L*hr    Predicted Steady State AUC on Current Dose: -- mg/L*hr  _________________________________  Predicted Steady State AUC on New Dose:   410 mg/L*hr    Assessment/Plan:  1. Will give vancomycin 2000 mg IV once                               followed by      Vancomycin 1000 mg IV q 12 hours    2. Vancomycin trough is scheduled 3/3 at 10:00 prior to the 4th dose. Predicted Steady State AUC at current dose: 410 mg/L*hr.      Josafat Lujan, PharmD, BCPS  3/1/2023  22:51 EST

## 2023-03-02 NOTE — DISCHARGE PLACEMENT REQUEST
"Vianney Lovelace (82 y.o. Female)   Referred by Dr. Selene Jefferson  PCP-Dr. Young Huynh  Dx-to be determined  Screened negative for covid 19 on 3/1/2023    Date of Birth   1940    Social Security Number       [place]   [address] [facility] [address]    Home Phone   [phone]    MRN   [id number]       Baptism   None    Marital Status                               Admission Date   3/1/23    Admission Type   Urgent    Admitting Provider   Selene Jefferson MD    Attending Provider   Selene Jefferson MD    Department, Room/Bed   [facility] 6B, N638/1       Discharge Date       Discharge Disposition       Discharge Destination                               Attending Provider: Selene Jefferson MD    Allergies: Macrobid [Nitrofurantoin], Naproxen, Oxaprozin, Sulfa Antibiotics    Isolation: Spore   Infection: Other (03/02/23)   Code Status: No CPR    Ht: 160 cm (63\")   Wt: 97.1 kg (214 lb)    Admission Cmt: None   Principal Problem: Diarrhea [R19.7]                 Active Insurance as of 3/1/2023     Primary Coverage     Payor Plan Insurance Group Employer/Plan Group    MEDICARE MEDICARE A & B      Payor Plan Address Payor Plan Phone Number Payor Plan Fax Number Effective Dates    [address] [phone]  6/1/2007 - None Entered    [facility] [id number]       Subscriber Name Subscriber Birth Date Member ID       VIANNEY LOVELACE 1940 [id number]           Secondary Coverage     Payor Plan Insurance Group Employer/Plan Group    [facility] [id number]     Payor Plan Address Payor Plan Phone Number Payor Plan Fax Number Effective Dates    [address]   12/1/2016 - None Entered    [facility] [id number]       Subscriber Name Subscriber Birth Date Member ID       VIANNEY LOVELACE 1940 [id number]                 Emergency Contacts      (Rel.) Home Phone Work Phone Mobile Phone    ALETA MICHAEL (Daughter) [phone] -- [phone]    JELLY LOVELACE (Son) " [phone] -- [phone]            Emergency Contact Information     Name Relation Home Work Mobile    ALETA MICHAEL Daughter [phone]    JELLY LOVELACE Son [phone]          Insurance Information                MEDICARE/MEDICARE A & B Phone: [phone]    Subscriber: Sol Lovelace Subscriber#: [id number]    Group#: -- Precert#: --        ANTHEM BLUE CROSS/ANTHEM BC  SUPP Phone: --    Subscriber: Sol Lovelace Subscriber#: [id number]    Group#: [id number] Precert#: --             History & Physical      Judith Tracey MD at [address] [place] [address] Medicine Services  HISTORY AND PHYSICAL    Patient Name: Sol Lovelace  : 1940  MRN: [id number]  Primary Care Physician: Young Huynh MD    Subjective   Subjective     Chief Complaint:diarrhea    HPI:  Sol Lovelace is a 82 y.o. female who  has a past medical history of Asthma, Chronic back pain, GERD (gastroesophageal reflux disease), Hyperlipidemia, Hypertension, Osteoarthritis, and Rheumatoid arthritis (HCC). she presented to Urology clinic for a catheter exchange today (after missing her prior appointment for scheduled exchange). Patient was found to have diarrhea of up to 12 times a day with skin breakdown and possible fistulas near her bottom.  History from urology clinic  Family unable to answer the phone tonight.      Review of Systems   POOR historian  Otherwise complete ROS is negative except as mentioned in the HPI.    Personal History     Past Medical History:   Diagnosis Date   • Asthma    • Chronic back pain    • GERD (gastroesophageal reflux disease)    • Hyperlipidemia    • Hypertension    • Osteoarthritis    • Rheumatoid arthritis (HCC)        Past Surgical History:   Procedure Laterality Date   • LAPAROSCOPIC VAGINAL HYSTERECTOMY      BSO, bladder suspension       Family History: family history includes COPD in her father; Cancer in her mother.     Social  History:  reports that she has never smoked. She does not have any smokeless tobacco history on file. She reports that she does not currently use alcohol. She reports that she does not use drugs.  , daughter lives with them  Medications:  B-12, Fluticasone-Salmeterol, NIFEdipine CC, albuterol sulfate HFA, apixaban, atorvastatin, bisacodyl, bisoprolol, estradiol, nitrofurantoin (macrocrystal-monohydrate), ondansetron, pantoprazole, predniSONE, simethicone, and vitamin b complex    Allergies   Allergen Reactions   • Macrobid [Nitrofurantoin] Nausea And Vomiting   • Naproxen Unknown (See Comments)   • Oxaprozin Hives   • Sulfa Antibiotics Unknown - Low Severity       Objective   Objective     Vital Signs:   Temp:  [96.5 °F (35.8 °C)-97.7 °F (36.5 °C)] 96.5 °F (35.8 °C)  Heart Rate:  [] 79  Resp:  [16-20] 16  BP: ()/(57-69) 100/66    Constitutional:  interactive and pleasant, not oriented, sleepy but tried to answer questions, generally weak and immobile  Eyes: clear sclerae, no conjunctival injection  HENT: NCAT, mucous membranes moist  Neck: no masses or lymphadenopathy, trachea midline  Respiratory: good breath sounds bilaterally, respirations unlabored  Cardiovascular: RRR, no murmurs appreciated, palpable peripheral pulses  Abdomen:  soft, no HSM or masses palpable, not tender or distended, obese  Musculoskeletal: No peripheral edema, clubbing or cyanosis- chronic OA changes  Neurologic: Oriented x 3,                       Strength symmetric in all extremities                     Cranial Nerves grossly intact, speech clear  Skin: No rashes or jaundice--- Bottom not seen (description reviewed with staff)  Psychiatric: aggreable      Result Review:  I have personally reviewed the results from the time of this admission   to 3/2/2023 00:02 EST and agree with these findings:  [x]  Laboratory  [x]  Microbiology  [x]  Radiology  [x]  EKG/Telemetry   []  Cardiology/Vascular   []  Pathology  [x]  Old  records  []  Other:  Most notable findings include: wbc 12, LA 3, K 2.9, albumin 2.5      LAB RESULTS:      Lab 03/01/23  2106 03/01/23 2105   WBC  --  12.57*   HEMOGLOBIN  --  12.9   HEMATOCRIT  --  41.5   PLATELETS  --  164   NEUTROS ABS  --  10.47*   IMMATURE GRANS (ABS)  --  0.08*   LYMPHS ABS  --  1.44   MONOS ABS  --  0.50   EOS ABS  --  0.03   MCV  --  91.4   PROCALCITONIN  --  0.33*   LACTATE 3.2*  --          Lab 03/01/23  2106 03/01/23 2105   SODIUM  --  133*   POTASSIUM  --  2.9*   CHLORIDE  --  96*   CO2  --  22.0   ANION GAP  --  15.0   BUN  --  18   CREATININE  --  0.53*   EGFR  --  93.6   GLUCOSE  --  91   CALCIUM  --  8.2*   MAGNESIUM  --  1.9   HEMOGLOBIN A1C 5.70*  --    TSH  --  5.390*         Lab 03/01/23 2105   TOTAL PROTEIN 5.3*   ALBUMIN 2.5*   GLOBULIN 2.8   ALT (SGPT) 15   AST (SGOT) 25   BILIRUBIN 0.9   ALK PHOS 69         Lab 03/01/23 2105   HSTROP T 67*                 Brief Urine Lab Results  (Last result in the past 365 days)      Color   Clarity   Blood   Leuk Est   Nitrite   Protein   CREAT   Urine HCG        03/01/23 2327 Dark Yellow   Turbid   Trace   Moderate (2+)   Negative   100 mg/dL (2+)               COVID19   Date Value Ref Range Status   11/01/2022 Presumptive Negative Presumptive Negative - Ref. Range Final       No radiology results from the last 24 hrs    Results for orders placed during the hospital encounter of 10/18/22    Adult Transthoracic Echo Complete W/ Cont if Necessary Per Protocol    Interpretation Summary  •  Estimated left ventricular EF = 55% Left ventricular systolic function is normal.  •  Estimated right ventricular systolic pressure from tricuspid regurgitation is normal (<35 mmHg). Calculated right ventricular systolic pressure from tricuspid regurgitation is 28 mmHg.      The patient has a COVID HM Topic on their chart, and they are fully vaccinated.    Assessment & Plan   Assessment / Plan       Diarrhea    Chronic indwelling Ayala catheter     Paroxysmal atrial fibrillation (HCC)    Essential hypertension    Current chronic use of systemic steroids    Adrenal insufficiency due to corticosteroid withdrawal (HCC)    Severe obesity (BMI 35.0-35.9 with comorbidity) (HCC)    Buttock wound      Assessment & Plan:  Sol Lovelace is a 82 y.o. female who  has a past medical history of Asthma, Chronic back pain, GERD (gastroesophageal reflux disease), Hyperlipidemia, Hypertension, Osteoarthritis, and Rheumatoid arthritis (HCC). she presented to Urology clinic for a catheter exchange today (after missing her prior appointment for scheduled exchange). Patient was found to have diarrhea of up to 12 times a day with skin breakdown and possible fistulas near her bottom.    Diarrhea  - GI PCR, contact precautions    Skin break down  -WOC consult  -CT to eval for depth/fistula  --added vanc and rocephin    Chronic Ayala  -nurse to exchange today and culture    Afib  -rate controled, cont eliquis and bisoprolol for rate control    Hypertension  -now hypotensive- hold nifedipine    Asthma  -cont advair    HLP  -cont lipitor    RA  Chronic steroids with HO adrenal insufficiency  Now hypotensive-- possible volume related  -- consider iv steroids if BP does not respond to IVFs    Chronic debility    DVT prophylaxis:  Medical DVT prophylaxis orders are present.    CODE STATUS:WAS NO CPR last admission< Family not available, needs further discussion     Expected Discharge  Expected Discharge Date and Time     Expected Discharge Date Expected Discharge Time    Mar 6, 2023           Electronically signed by Judith Tracey MD 03/02/23 00:02 EST            Electronically signed by Judith Tracey MD at 03/02/23 0003         Current Facility-Administered Medications   Medication Dose Route Frequency Provider Last Rate Last Admin   • acetaminophen (TYLENOL) tablet 650 mg  650 mg Oral Q4H PRN Judith Tracey MD   650 mg at 03/01/23 2011   • albuterol (PROVENTIL) nebulizer  solution 0.083% 2.5 mg/3mL  2.5 mg Nebulization Q6H PRN Judith Tracey MD       • apixaban (ELIQUIS) tablet 5 mg  5 mg Oral Q12H Judith Tracey MD   5 mg at 03/02/23 0858   • atorvastatin (LIPITOR) tablet 20 mg  20 mg Oral Nightly Judith Tracey MD   20 mg at 03/01/23 2011   • bisoprolol (ZEBeta) tablet 10 mg  10 mg Oral Q24H Judith Tracey MD       • budesonide-formoterol (SYMBICORT) 160-4.5 MCG/ACT inhaler 2 puff  2 puff Inhalation BID - RT Judith Tracey MD   2 puff at 03/02/23 0913   • Calcium Replacement - Initiate Nurse / BPA Driven Protocol   Does not apply PRN Zhen, Raina, APRN       • cefTRIAXone (ROCEPHIN) 2 g/100 mL 0.9% NS IVPB (MBP)  2 g Intravenous Q24H Judith Tracey MD   2 g at 03/01/23 2302   • HYDROcodone-acetaminophen (NORCO) 5-325 MG per tablet 1 tablet  1 tablet Oral Q4H PRN Judith Tracey MD       • lactated ringers infusion  75 mL/hr Intravenous Continuous Zhen, Raina, APRN 75 mL/hr at 03/02/23 0418 75 mL/hr at 03/02/23 0418   • Magnesium Standard Dose Replacement - Initiate Nurse / BPA Driven Protocol   Does not apply PRN Zhen, Raina, APRN       • magnesium sulfate 2g/50 mL (PREMIX) infusion  2 g Intravenous Q2H Zhen, Raina, APRN   2 g at 03/02/23 0858   • Pharmacy to dose vancomycin   Does not apply Continuous PRN Judith Tracey MD       • Phosphorus Replacement - Initiate Nurse / BPA Driven Protocol   Does not apply PRN Zhen, Raina, APRN       • Potassium Replacement - Initiate Nurse / BPA Driven Protocol   Does not apply PRN Zhen, Raina, APRN       • [START ON 3/3/2023] predniSONE (DELTASONE) tablet 10 mg  10 mg Oral Daily With Breakfast Selene Jefferson MD       • sodium chloride 0.9 % flush 10 mL  10 mL Intravenous Q12H Judith Tracey MD   10 mL at 03/02/23 0903   • sodium chloride 0.9 % flush 10 mL  10 mL Intravenous PRN Judith Tracey MD       • sodium chloride 0.9 % infusion 40 mL  40 mL Intravenous PRJudith Jimenez MD        • vancomycin (VANCOCIN) 1000 mg/200 mL dextrose 5% IVPB  10.3 mg/kg Intravenous Q12H Josafat Lujan, PharmD         Physician Progress Notes (last 72 hours)  Notes from 02/27/23 1003 through 03/02/23 1003   No notes of this type exist for this encounter.         Consult Notes (last 72 hours)  Notes from 02/27/23 1003 through 03/02/23 1003   No notes of this type exist for this encounter.

## 2023-03-02 NOTE — PROGRESS NOTES
"                    Clinical Nutrition       Patient Name: Sol Lovelace  YOB: 1940  MRN: [id number]  Date of Encounter: 03/02/23 15:19 EST  Admission date: 3/1/2023    Comment:    Unable to determine meeting needs for malnutrition as pt was STEPHENIE at time of visit.     Provide ONS 2x daily    Assistance with meals.     Reason for Visit   Identified at risk by screening criteria    EMR Reviewed     EMR Reviewed: yes     Admission Diagnosis:  Diarrhea [R19.7]    Problem List:    Diarrhea    Paroxysmal atrial fibrillation (HCC)    Essential hypertension    Current chronic use of systemic steroids    Adrenal insufficiency due to corticosteroid withdrawal (HCC)    Severe obesity (BMI 35.0-35.9 with comorbidity) (HCC)    Buttock wound    Chronic indwelling Ayala catheter          Anthropometric      Flowsheet Rows    Flowsheet Row First Filed Value   Admission Height 160 cm (63\") Documented at 03/01/2023 1706   Admission Weight 97.1 kg (214 lb) Documented at 03/01/2023 1706            Height: 160 cm (63\")  Last Filed Weight: Weight: 97.1 kg (214 lb) (03/01/23 1706)  Weight Method: Bed scale  BMI: BMI (Calculated): 37.9  BMI classification: Obese Class II: 35-39.9kg/m2   IBW:  115lbs    UBW:     Last 15 Recorded Weights  View Complete Flowsheet  Weight Weight (kg) Weight (lbs) Weight Method VISIT REPORT   3/1/2023 97.07 kg 214 lb Bed scale Report   1/23/2023 100.699 kg 222 lb - -   1/4/2023 100.699 kg 222 lb - Report   12/7/2022 100.699 kg 222 lb - Report   11/8/2022 100.88 kg 222 lb 6.4 oz - Report   11/1/2022 108.863 kg 240 lb - -   10/30/2022 108.863 kg 240 lb - -   10/29/2022 108.863 kg 240 lb Bed scale -   10/28/2022 111.131 kg 245 lb Bed scale -   10/27/2022 111.54 kg 245 lb 14.4 oz Bed scale -   10/25/2022 116.393 kg 256 lb 9.6 oz Bed scale -   10/24/2022 115.622 kg 254 lb 14.4 oz Bed scale -   10/23/2022 124.013 kg 273 lb 6.4 oz Bed scale -   10/22/2022 122.426 kg 269 lb 14.4 oz Bed scale - "   10/20/2022 116.892 kg 257 lb 11.2 oz Bed scale -     Weight change:   Had a non-significant weight loss of 8# (3.6%) in 3 months     Reported/Observed/Food/Nutrition Related - Comments     Pt STEPHENIE at time of visit for imaging. Pt with recent diarrhea (~12x daily) for several weeks. Pt with MASD per WOC note with recommendation for FMS. Per CM note pt likely home with hospice. Pt with poor intake - per NA pt may require assistance at meals.       Current Nutrition Prescription     Diet: Regular/House Diet; Texture: Regular Texture (IDDSI 7); Fluid Consistency: Thin (IDDSI 0)  No active supplement orders      Average Intake from Chartin Day: bites    Nutrition Diagnosis     Problem Predicted suboptimal energy intake   Etiology Self-feeding difficulty   Signs/Symptoms <25% since admission   Status:    Actions     Follow treatment progress, Care plan reviewed, Encourage intake, Supplement provided    Will follow for PO acceptance.     Monitor Per Protocol      Raquel Cates, MS,RD,LD,   Time Spent: 20min

## 2023-03-02 NOTE — CONSULTS
INFECTIOUS DISEASE CONSULT/INITIAL HOSPITAL VISIT    Sol Lovelace  1940  [id number]    Date of Consult: 3/2/2023    Admission Date: 3/1/2023      Requesting Provider: Judith Tracey MD  Evaluating Physician: Raquel Anderson MD    Reason for Consultation: uti, perianal wounds    History of present illness:    Patient is a 82 y.o. female with multiple medical problems including Rheumatoid arthritis, chronic back pain, immobility and urinary retention requiring gerber catheterization. She was referred for admission from the Urology clinic after missing multiple appointments for catheter exchange and discussion of suprapubic catheter placement. At the clinic she was encephalopathic, had purulent urine and had multiple wounds on her perineum and gluteal area. Evidently she has chronic diarrhea and incontinence. She was referred for admission. WBC 12.57, procalcitonin 0.33, UA with TNTC WBC. Urine and blood cultures are pending. She was started on ceftriaxone. She has a history of an Ecoli uti 6 weeks ago with a sensitive Ecoli treated with cipro.  No family members are at the bedside and she is unable to provide additional history Per RN she does not appear to have severe diarrhea    Past Medical History:   Diagnosis Date   • Asthma    • Chronic back pain    • GERD (gastroesophageal reflux disease)    • Hyperlipidemia    • Hypertension    • Osteoarthritis    • Rheumatoid arthritis (HCC)        Past Surgical History:   Procedure Laterality Date   • LAPAROSCOPIC VAGINAL HYSTERECTOMY      BSO, bladder suspension       Family History   Problem Relation Age of Onset   • Cancer Mother    • COPD Father        Social History     Socioeconomic History   • Marital status:    Tobacco Use   • Smoking status: Never   Vaping Use   • Vaping Use: Never used   Substance and Sexual Activity   • Alcohol use: Not Currently   • Drug use: Never   • Sexual activity: Not Currently       Allergies   Allergen Reactions   •  Macrobid [Nitrofurantoin] Nausea And Vomiting   • Naproxen Unknown (See Comments)   • Oxaprozin Hives   • Sulfa Antibiotics Unknown - Low Severity         Medication:    Current Facility-Administered Medications:   •  acetaminophen (TYLENOL) tablet 650 mg, 650 mg, Oral, Q4H PRN, Judith Tracey MD, 650 mg at 03/01/23 2011  •  albuterol (PROVENTIL) nebulizer solution 0.083% 2.5 mg/3mL, 2.5 mg, Nebulization, Q6H PRN, Judith Tracey MD  •  apixaban (ELIQUIS) tablet 5 mg, 5 mg, Oral, Q12H, Judith Tracey MD, 5 mg at 03/02/23 0858  •  atorvastatin (LIPITOR) tablet 20 mg, 20 mg, Oral, Nightly, Judith Tracey MD, 20 mg at 03/01/23 2011  •  bisoprolol (ZEBeta) tablet 10 mg, 10 mg, Oral, Q24H, Judith Tracey MD  •  budesonide-formoterol (SYMBICORT) 160-4.5 MCG/ACT inhaler 2 puff, 2 puff, Inhalation, BID - RT, Judith Tracey MD, 2 puff at 03/02/23 0913  •  Calcium Replacement - Initiate Nurse / BPA Driven Protocol, , Does not apply, PRN, Zhen, Raina, APRN  •  cefTRIAXone (ROCEPHIN) 2 g/100 mL 0.9% NS IVPB (MBP), 2 g, Intravenous, Q24H, Judith Tracey MD, 2 g at 03/01/23 2302  •  HYDROcodone-acetaminophen (NORCO) 5-325 MG per tablet 1 tablet, 1 tablet, Oral, Q4H PRN, Judith Tracey MD  •  lactated ringers infusion, 75 mL/hr, Intravenous, Continuous, Zhen, Raina, APRN, Last Rate: 75 mL/hr at 03/02/23 0418, 75 mL/hr at 03/02/23 0418  •  Magnesium Standard Dose Replacement - Initiate Nurse / BPA Driven Protocol, , Does not apply, PRN, Zhen, Raina, APRN  •  Pharmacy to dose vancomycin, , Does not apply, Continuous PRN, Judith Tracey MD  •  Phosphorus Replacement - Initiate Nurse / BPA Driven Protocol, , Does not apply, PRN, Raina Fontaine, APRN  •  Potassium Replacement - Initiate Nurse / BPA Driven Protocol, , Does not apply, PRN, Raina Fontaine, APRN  •  [START ON 3/3/2023] predniSONE (DELTASONE) tablet 10 mg, 10 mg, Oral, Daily With Breakfast, Selene Jefferson MD  •  sodium chloride  0.9 % flush 10 mL, 10 mL, Intravenous, Q12H, Judith Tracey MD, 10 mL at 23 0903  •  sodium chloride 0.9 % flush 10 mL, 10 mL, Intravenous, PRN, Judith Tracey MD  •  sodium chloride 0.9 % infusion 40 mL, 40 mL, Intravenous, PRN, Judith Tracey MD  •  vancomycin (VANCOCIN) 1000 mg/200 mL dextrose 5% IVPB, 10.3 mg/kg, Intravenous, Q12H, 1,000 mg at 23 1149 **AND** [START ON 3/3/2023] Vancomycin, Trough, , , Timed, Josafat Lujan, PharmD    Antibiotics:  Anti-Infectives (From admission, onward)    Ordered     Dose/Rate Route Frequency Start Stop    23 2250  vancomycin (VANCOCIN) 1000 mg/200 mL dextrose 5% IVPB        Ordering Provider: Josafat Lujan, Izzy   See [facility] for full Linked Orders Report.    10.3 mg/kg × 97.1 kg  over 60 Minutes Intravenous Every 12 Hours Scheduled 23 1200 23 2359    23 2250  vancomycin IVPB 2000 mg in 0.9% Sodium Chloride (premix) 500 mL        Ordering Provider: Josafat Lujan, PharmD    20 mg/kg × 97.1 kg  over 120 Minutes Intravenous Once 23 2345 23 0204    23 2235  cefTRIAXone (ROCEPHIN) 2 g/100 mL 0.9% NS IVPB (MBP)        Ordering Provider: Judith Tracey MD    2 g  over 30 Minutes Intravenous Every 24 Hours Scheduled 23 2245 23 2159    23 2235  Pharmacy to dose vancomycin        Ordering Provider: Judith Tracey MD     Does not apply Continuous PRN 23 [id number] 2233            Review of Systems:  Unable to obtain from patient  According to the chart she has a chronic gerber, chronic diarrhea and fecal incintinence      Physical Exam:   Vital Signs  Temp (24hrs), Av.5 °F (36.4 °C), Min:96.5 °F (35.8 °C), Max:98.3 °F (36.8 °C)    Temp  Min: 96.5 °F (35.8 °C)  Max: 98.3 °F (36.8 °C)  BP  Min: 92/46  Max: 105/71  Pulse  Min: 79  Max: 104  Resp  Min: 16  Max: 20  SpO2  Min: 93 %  Max: 98 %    GENERAL: sleepy but easily awakened, in no distress but extremely resistant  "to being turned or moved because \"everything hurts\"   HEENT: Normocephalic, atraumatic.EOMI. No conjunctival injection. No icterus. Oropharynx poorly visualized.     NECK: Supple without nuchal rigidity. No masses.  LYMPH: No cervical lymphadenopathy.  HEART: RRR; No murmur, rubs, gallops.   LUNGS: No wheezing, rales, rhonchi. Poor respiratory effort. Nonlabored.   ABDOMEN: Soft, nontender, nondistended. Positive bowel sounds. No rebound or guarding.  EXT:  No cyanosis, clubbing or edema. No cords.  :  With Ayala catheter.She refused to allow me to turn her over to evaluate her buttock wounds since she had recently been evaluated by the [facility]. Pictures provided by the [facility] showed multiple wounds with full-thickness skin loss at her left ischial tuberosity and bilateral medial gluteals. The left ischial wound could not be fully probed because of abundant fibrous slough  MSK: No joint effusions or erythema   NEURO: Oriented to person and place.  Motor strength not assessed because of diffuse pain  PSYCHIATRIC: . Not cooperative with PE because of pain  Laboratory Data    Results from last 7 days   Lab Units 03/02/23  0250 03/01/23  2105   WBC 10*3/mm3 11.58* 12.57*   HEMOGLOBIN g/dL 12.5 12.9   HEMATOCRIT % 39.4 41.5   PLATELETS 10*3/mm3 153 164     Results from last 7 days   Lab Units 03/02/23  0250   SODIUM mmol/L 132*   POTASSIUM mmol/L 3.1*   CHLORIDE mmol/L 97*   CO2 mmol/L 20.0*   BUN mg/dL 18   CREATININE mg/dL 0.32*   GLUCOSE mg/dL 93   CALCIUM mg/dL 7.5*     Results from last 7 days   Lab Units 03/01/23  2105   ALK PHOS U/L 69   BILIRUBIN mg/dL 0.9   ALT (SGPT) U/L 15   AST (SGOT) U/L 25             Results from last 7 days   Lab Units 03/02/23  0703   LACTATE mmol/L 3.3*     Results from last 7 days   Lab Units 03/01/23  2105   CK TOTAL U/L 101         Estimated Creatinine Clearance: 155.6 mL/min (A) (by C-G formula based on SCr of 0.32 mg/dL (L)).      Microbiology:  No results found for: ACANTHNAEG, " AFBCX, BPERTUSSISCX, BLOODCX  No results found for: BCIDPCR, CXREFLEX, CSFCX, CULTURETIS  No results found for: CULTURES, HSVCX, URCX  No results found for: EYECULTURE, GCCX, HSVCULTURE, LABHSV  No results found for: LEGIONELLA, MRSACX, MUMPSCX, MYCOPLASCX  No results found for: NOCARDIACX, STOOLCX  Urine Culture   Date Value Ref Range Status   03/01/2023 Culture in progress  Preliminary     No results found for: VIRALCULTU, WOUNDCX        Radiology:  Imaging Results (Last 72 Hours)     ** No results found for the last 72 hours. **            Impression:     -- Complicated uti in patient who requires chronic gerber catheter Based on prior cultures she does not appear to be at high risk for MDR pathogens    -- Multiple perineal and gluteal wounds which are unstageable and resulting from chronic incontinence and pressure     -- Urinary retention requiring chronic catheterization    -- Chronic pain that precludes cooperation with wound care, mobilization etc    -- Diarrhea, severity unclear and may be difficult to evaluate in the presence of fecal incontinence  Per RN she has not produced a specimen that can be submitted for studies such as cdiff    -- Chronic steroid use    -- Prior diagnosis of Rheumatoid Arthritis, data deficit    --BMI 37.9 which complicates management of diarrhea and skin care      PLAN/RECOMMENDATIONS:   Thank you for asking us to see Sol Lovelace, I recommend the following:      -- Agree with ceftriaxone while urine culture pending  -- stop vancomycin since the likelihood of gram positive infection appears low  -- stool studies ordered  -- Agree with hospice evaluation  --        Raquel Anderson MD  3/2/2023  12:04 EST

## 2023-03-02 NOTE — CONSULTS
[facility]   HISTORY AND PHYSICAL    Patient Name: Sol Lovelace  : 1940  MRN: [id number]  Primary Care Physician:  Young Huynh MD  Date of admission: 3/1/2023    Subjective   Subjective     Chief Complaint: Diarrhea    HPI:    Sol Lovelace is a 82 y.o. female who presented to our clinic on 23 for routine gerber catheter exchange. She was last seen in our office on 23 for gerber catheter exchange. She was scheduled for follow up with Dr. Knight on 23 to discuss possible suprapubic tube placement given traumatic gerber catheter exchanges due to pain from raw vaginal tissue and vaginal edema. Unfortunately, she cancelled that appointment and cancelled appointment on 23 for gerber catheter exchange.     In our office she was found to have purulent urine with sediment draining to gerber catheter, she was lethargic and found to have multiple sacral/vaginal pressure wounds. Her daughter and another family member presented with her and reported she has had diarrhea for several weeks somewhere around 10-12 times per day. Her daughter also reported decreased PO intake.     Urine culture 23 with >100,000 E.Coli. She was treated with Levofloxacin at that time.     Labs remarkable for K+ 3.1, , Mag 1.4, WBC 11.58, Lactic acid 3.3. She has been afebrile. She is hypotensive.     She is currently resting in bed. Gerber catheter draining yellow/joey urine output. She reports moderate sacral pain.     No family at bedside currently.     Review of Systems   As above    Personal History     Past Medical History:   Diagnosis Date   • Asthma    • Chronic back pain    • GERD (gastroesophageal reflux disease)    • Hyperlipidemia    • Hypertension    • Osteoarthritis    • Rheumatoid arthritis (HCC)        Past Surgical History:   Procedure Laterality Date   • LAPAROSCOPIC VAGINAL HYSTERECTOMY      BSO, bladder suspension       Family History: family history includes COPD in her father;  Cancer in her mother. Otherwise pertinent FHx was reviewed and not pertinent to current issue.    Social History:  reports that she has never smoked. She does not have any smokeless tobacco history on file. She reports that she does not currently use alcohol. She reports that she does not use drugs.    Home Medications:  B-12, Fluticasone-Salmeterol, NIFEdipine CC, albuterol sulfate HFA, apixaban, atorvastatin, bisacodyl, bisoprolol, estradiol, nitrofurantoin (macrocrystal-monohydrate), ondansetron, pantoprazole, predniSONE, simethicone, and vitamin b complex      Allergies:  Allergies   Allergen Reactions   • Macrobid [Nitrofurantoin] Nausea And Vomiting   • Naproxen Unknown (See Comments)   • Oxaprozin Hives   • Sulfa Antibiotics Unknown - Low Severity       Objective   Objective     Vitals:   Temp:  [96.5 °F (35.8 °C)-97.9 °F (36.6 °C)] 97.9 °F (36.6 °C)  Heart Rate:  [] 90  Resp:  [16-20] 18  BP: ()/(46-71) 92/46  Physical Exam    Constitutional: Awake in bed, alert    Eyes: PERRLA, sclerae anicteric, no conjunctival injection   HENT: Normocephalic, atraumatic, mucous membranes moist   Neck: Supple, trachea midline   Respiratory:Equal chest rise, non-labored respirations    Cardiovascular: RRR   Gastrointestinal: Soft   Musculoskeletal: No bilateral ankle edema, no clubbing or cyanosis to extremities   Genitourinary: 2 way 16 fr gerber catheter draining yellow/joey urine output   Psychiatric: Appropriate affect, cooperative   Neurologic: Oriented x 3, Cranial Nerves grossly intact, speech clear   Skin: sacral/vaginal pressure wounds    Result Review    Result Review:  I have personally reviewed the results from the time of this admission to 3/2/2023 09:50 EST and agree with these findings:  [x]  Laboratory  [x]  Microbiology  []  Radiology  []  EKG/Telemetry   []  Cardiology/Vascular   []  Pathology  [x]  Old records  [x]  Other: Vital signs    Most notable findings include: Lactic acid  3.3.    Assessment & Plan   Assessment / Plan     Brief Patient Summary:  Sol Lovelace is a 82 y.o. female who presented to [facility] Urology clinic on 03/01/23 for gerber catheter exchange and was found to be lethargic with multiple sacral pressure wounds and diarrhea who was instructed to present to [facility] for direct admission.     Active Hospital Problems:  Active Hospital Problems    Diagnosis    • **Diarrhea    • Buttock wound    • Chronic indwelling Gerber catheter    • Current chronic use of systemic steroids    • Adrenal insufficiency due to corticosteroid withdrawal (HCC)    • Severe obesity (BMI 35.0-35.9 with comorbidity) (HCC)    • Paroxysmal atrial fibrillation (HCC)    • Essential hypertension      Plan:   -Keep gerber catheter in place  -Abx, urine cx pending  -Wound care  -Appreciate Hospitalist   will be available as needed, please call if any questions  D/w Dr. Knight.    DVT prophylaxis:  Medical DVT prophylaxis orders are present.    CODE STATUS:    Medical Intervention Limits: NO intubation (DNI)  Code Status (Patient has no pulse and is not breathing): No CPR (Do Not Attempt to Resuscitate)  Medical Interventions (Patient has pulse or is breathing): Limited Support  Comments: no ICU escalation  Release to patient: Routine Release    Admission Status:  I believe this patient meets inpatient status.    Electronically signed by GLADIS Phillips, 03/02/23, 9:50 AM EST.

## 2023-03-02 NOTE — ACP (ADVANCE CARE PLANNING)
D/w daughter and patient 3/2, know them from prior 10/2022 admission. Overall, patient remains bedbound, poorly mobile and no significant rehab (or patient motivation to eat/ambulate etc). Worry prognosis is <6 months given her overall bedbound status + poor state but difficult to prognosticate without clear diagnosis for her FTT. Family interested in home hospice from here if qualify given prognosis

## 2023-03-02 NOTE — THERAPY EVALUATION
Patient Name: Sol Lovelace  : 1940    MRN: [id number]                              Today's Date: 3/2/2023       Admit Date: 3/1/2023    Visit Dx: No diagnosis found.  Patient Active Problem List   Diagnosis   • E. coli UTI   • Hyponatremia   • Elevated liver enzymes   • Hypoalbuminemia   • Leukocytosis   • Proteinuria   • Paroxysmal atrial fibrillation (HCC)   • Essential hypertension   • Acute urinary retention   • Current chronic use of systemic steroids   • Adrenal insufficiency due to corticosteroid withdrawal ([facility])   • Severe obesity (BMI 35.0-35.9 with comorbidity) ([facility])   • Moderate asthma with exacerbation   • Diarrhea   • Buttock wound   • Chronic indwelling Ayala catheter     Past Medical History:   Diagnosis Date   • Asthma    • Chronic back pain    • GERD (gastroesophageal reflux disease)    • Hyperlipidemia    • Hypertension    • Osteoarthritis    • Rheumatoid arthritis ([facility])      Past Surgical History:   Procedure Laterality Date   • LAPAROSCOPIC VAGINAL HYSTERECTOMY      BSO, bladder suspension      General Information     Row Name 23 1009          OT Time and Intention    Document Type evaluation  -     Mode of Treatment occupational therapy  -     Row Name 23 1009          General Information    Patient Profile Reviewed yes  -     Prior Level of Function max assist:;transfer;w/c or scooter;dependent:;ADL's  Pt sleeps in a recliner, has been using a w/c recently, but reports only getting to/from w/c for dr appointments w/ assist from a caregiver. Otherwise, pt's daughter assists pt to roll in recliner for toilet hygiene and dep for other ADLs.  -     Existing Precautions/Restrictions fall;other (see comments)  poor skin integrity  -     Barriers to Rehab medically complex;previous functional deficit;physical barrier  -     Row Name 23 1009          Living Environment    People in Home child(tre), adult;spouse  -     Row Name 23 1009          Home Main  Entrance    Number of Stairs, Main Entrance other (see comments)  Pt reports ramp access to enter home  -     Row Name 03/02/23 1009          Stairs Within Home, Primary    Stairs, Within Home, Primary Pt reports 2nd level of home, but needs met on main floor  -     Number of Stairs, Within Home, Primary other (see comments)  -     Row Name 03/02/23 1009          Cognition    Orientation Status (Cognition) oriented x 3  -     Row Name 03/02/23 1009          Safety Issues, Functional Mobility    Safety Issues Affecting Function (Mobility) awareness of need for assistance;friction/shear risk;insight into deficits/self-awareness;judgment;problem-solving;safety precaution awareness;safety precautions follow-through/compliance;sequencing abilities  -     Impairments Affecting Function (Mobility) balance;endurance/activity tolerance;pain;postural/trunk control;range of motion (ROM);sensation/sensory awareness;strength  -           User Key  (r) = Recorded By, (t) = Taken By, (c) = Cosigned By    Initials Name Provider Type     Natalie Mack OT Occupational Therapist                 Mobility/ADL's     Row Name 03/02/23 1012          Bed Mobility    Bed Mobility rolling left;rolling right;scooting/bridging  -     Rolling Left [place] (Bed Mobility) maximum assist (25% patient effort);2 person assist;verbal cues  -     Rolling Right [place] (Bed Mobility) maximum assist (25% patient effort);2 person assist;verbal cues  -     Scooting/Bridging [place] (Bed Mobility) dependent (less than 25% patient effort);2 person assist;verbal cues  -     Comment, (Bed Mobility) Reclined in chair, wedge placed on pt's L hip for pressure relief; further bed mobility deferred to PT  -     Row Name 03/02/23 1012          Transfers    Transfers --  bed-to-chair transfer deferred to PT  -     Comment, (Transfers) STS deferred this date d/t poor sitting balance, increased pain, and fear of falling.   -     Row Name 03/02/23 1012          Activities of Daily Living    BADL Assessment/Intervention grooming;feeding  -     Row Name 03/02/23 1012          Grooming Assessment/Training    [place] Level (Grooming) wash face, hands;dependent (less than 25% patient effort);verbal cues  washing hands  -     Position (Grooming) supported sitting  -     Row Name 03/02/23 1012          Self-Feeding Assessment/Training    [place] Level (Feeding) prepare tray/open items;dependent (less than 25% patient effort);verbal cues  -     Position (Self-Feeding) supported sitting  -           User Key  (r) = Recorded By, (t) = Taken By, (c) = Cosigned By    Initials Name Provider Type     Natalie Mack OT Occupational Therapist               Obj/Interventions     [facility] Name 03/02/23 1014          Sensory Assessment (Somatosensory)    Sensory Assessment (Somatosensory) UE sensation intact  -[facility] Name 03/02/23 1014          Vision Assessment/Intervention    Visual Impairment/Limitations WFL  -     Row Name 03/02/23 1014          Range of Motion Comprehensive    General Range of Motion bilateral upper extremity ROM WFL  -[facility] Name 03/02/23 1014          Strength Comprehensive (MMT)    General Manual Muscle Testing (MMT) Assessment upper extremity strength deficits identified  -     Comment, General Manual Muscle Testing (MMT) Assessment BUE grossly 3-/5  -     Row Name 03/02/23 1014          Shoulder (Therapeutic Exercise)    Shoulder (Therapeutic Exercise) AAROM (active assistive range of motion)  -     Shoulder AAROM (Therapeutic Exercise) bilateral;flexion;extension;aBduction;aDduction;10 repetitions;sitting  -     Row Name 03/02/23 1014          Elbow/Forearm (Therapeutic Exercise)    Elbow/Forearm (Therapeutic Exercise) AAROM (active assistive range of motion)  -     Elbow/Forearm AAROM (Therapeutic Exercise) bilateral;flexion;extension;10 repetitions;sitting  -     Row Name  03/02/23 1014          Wrist (Therapeutic Exercise)    Wrist (Therapeutic Exercise) AAROM (active assistive range of motion)  -     Wrist AAROM (Therapeutic Exercise) bilateral;flexion;extension;10 repetitions  -[facility] Name 03/02/23 1014          Hand (Therapeutic Exercise)    Hand (Therapeutic Exercise) AROM (active range of motion)  -     Hand AROM/AAROM (Therapeutic Exercise) bilateral;AROM (active range of motion);finger flexion;finger extension;10 repetitions  -[facility] Name 03/02/23 1014          Motor Skills    Therapeutic Exercise shoulder;elbow/forearm;wrist;hand  -[facility] Name 03/02/23 1014          Balance    Balance Assessment sitting static balance  -     Static Sitting Balance maximum assist;verbal cues  -     Position, Sitting Balance supported;sitting in chair  -     Balance Interventions sitting;sit to stand;occupation based/functional task  -           User Key  (r) = Recorded By, (t) = Taken By, (c) = Cosigned By    Initials Name Provider Type     Natalie Mack OT Occupational Therapist               Goals/Plan     [facility] Name 03/02/23 1034          Bed Mobility Goal 1 (OT)    Activity/Assistive Device (Bed Mobility Goal 1, OT) rolling to left;rolling to right;sit to supine;supine to sit  -     [place] Level/Cues Needed (Bed Mobility Goal 1, OT) moderate assist (50-74% patient effort);verbal cues required  -     Time Frame (Bed Mobility Goal 1, OT) long term goal (LTG);10 days  -     Progress/Outcomes (Bed Mobility Goal 1, OT) goal ongoing  -[facility] Name 03/02/23 1034          Transfer Goal 1 (OT)    Activity/Assistive Device (Transfer Goal 1, OT) sit-to-stand/stand-to-sit;bed-to-chair/chair-to-bed;commode, bedside without drop arms  -     [place] Level/Cues Needed (Transfer Goal 1, OT) maximum assist (25-49% patient effort);verbal cues required  -     Time Frame (Transfer Goal 1, OT) long term goal (LTG);10 days  -     Progress/Outcome (Transfer  Goal 1, OT) goal ongoing  -     Row Name 03/02/23 1034          Grooming Goal 1 (OT)    Activity/Device (Grooming Goal 1, OT) hair care;oral care;wash face, hands  unsupported sitting  -     [place] (Grooming Goal 1, OT) minimum assist (75% or more patient effort);verbal cues required  -     Time Frame (Grooming Goal 1, OT) long term goal (LTG);10 days  -     Progress/Outcome (Grooming Goal 1, OT) goal ongoing  -     Row Name 03/02/23 1034          Strength Goal 1 (OT)    Strength Goal 1 (OT) Pt will tolerate 10 reps of BUE AROM to support ADL independnece.  -     Time Frame (Strength Goal 1, OT) long term goal (LTG);10 days  -     Progress/Outcome (Strength Goal 1, OT) goal ongoing  -     Row Name 03/02/23 1034          Therapy Assessment/Plan (OT)    Planned Therapy Interventions (OT) activity tolerance training;adaptive equipment training;BADL retraining;functional balance retraining;IADL retraining;occupation/activity based interventions;ROM/therapeutic exercise;strengthening exercise;transfer/mobility retraining;patient/caregiver education/training;edema control/reduction  -           User Key  (r) = Recorded By, (t) = Taken By, (c) = Cosigned By    Initials Name Provider Type    Natalie Cifuentes OT Occupational Therapist               Clinical Impression     Row Name 03/02/23 1032          Pain Assessment    Pretreatment Pain Rating 9/10  -     Posttreatment Pain Rating 9/10  -     Pain Location generalized  -     Pain Location - buttock  -     Pain Intervention(s) Repositioned;Ambulation/increased activity;Nursing Notified  -     Row Name 03/02/23 1032          Plan of Care Review    Plan of Care Reviewed With patient  -     Outcome Evaluation Pt presents w/ increased pain, decreased skin integrity, generalized weakness, decreased functional endurance, and balance deficits limiting her ADL independence. Pt would benefit from continued skilled IPOT services to address  current functional deficits. Rec SNF at d/c.  -     Row Name 03/02/23 1032          Therapy Assessment/Plan (OT)    Rehab Potential (OT) good, to achieve stated therapy goals  -     Criteria for Skilled Therapeutic Interventions Met (OT) yes;skilled treatment is necessary  -     Therapy Frequency (OT) daily  -     Row Name 03/02/23 1032          Therapy Plan Review/Discharge Plan (OT)    Anticipated Discharge Disposition (OT) skilled nursing facility  -     Row Name 03/02/23 1032          Vital Signs    Pre Systolic BP Rehab --  VSS - RN cleared OT  -     O2 Delivery Pre Treatment room air  -     O2 Delivery Intra Treatment room air  -     O2 Delivery Post Treatment room air  -     Pre Patient Position Sitting  -     Intra Patient Position Standing  -     Post Patient Position Sitting  -     Row Name 03/02/23 1032          Positioning and Restraints    Pre-Treatment Position in bed  -     Post Treatment Position chair  -     In Chair reclined;call light within reach;encouraged to call for assist;exit alarm on;waffle cushion;on mechanical lift sling;legs elevated;waffle boot/both;with nsg  -           User Key  (r) = Recorded By, (t) = Taken By, (c) = Cosigned By    Initials Name Provider Type     Natalie Mack, TEO Occupational Therapist               Outcome Measures     Row Name 03/02/23 1035          How much help from another is currently needed...    Putting on and taking off regular lower body clothing? 1  -MC     Bathing (including washing, rinsing, and drying) 1  -MC     Toileting (which includes using toilet bed pan or urinal) 1  -MC     Putting on and taking off regular upper body clothing 1  -MC     Taking care of personal grooming (such as brushing teeth) 1  -MC     Eating meals 2  -MC     AM-PAC 6 Clicks Score (OT) 7  -     Row Name 03/02/23 0914          How much help from another person do you currently need...    Turning from your back to your side while in flat  bed without using bedrails? 1  -KR     Moving from lying on back to sitting on the side of a flat bed without bedrails? 1  -KR     Moving to and from a bed to a chair (including a wheelchair)? 1  -KR     Standing up from a chair using your arms (e.g., wheelchair, bedside chair)? 1  -KR     Climbing 3-5 steps with a railing? 1  -KR     To walk in hospital room? 1  -KR     AM-PAC 6 Clicks Score (PT) 6  -KR     Highest level of mobility 2 --> Bed activities/dependent transfer  -KR     Row Name 03/02/23 1035 03/02/23 0914       Functional Assessment    Outcome Measure Options AM-PAC 6 Clicks Daily Activity (OT)  - AM-PAC 6 Clicks Basic Mobility (PT)  -          User Key  (r) = Recorded By, (t) = Taken By, (c) = Cosigned By    Initials Name Provider Type    Natalie Cifuentes, OT Occupational Therapist    Kristel Macedo, PT Physical Therapist                Occupational Therapy Education     Title: PT OT SLP Therapies (In Progress)     Topic: Occupational Therapy (In Progress)     Point: ADL training (In Progress)     Description:   Instruct learner(s) on proper safety adaptation and remediation techniques during self care or transfers.   Instruct in proper use of assistive devices.              Learning Progress Summary           Patient Acceptance, E, NR by  at 3/2/2023 1035                   Point: Home exercise program (In Progress)     Description:   Instruct learner(s) on appropriate technique for monitoring, assisting and/or progressing therapeutic exercises/activities.              Learning Progress Summary           Patient Acceptance, E, NR by  at 3/2/2023 1035                   Point: Precautions (In Progress)     Description:   Instruct learner(s) on prescribed precautions during self-care and functional transfers.              Learning Progress Summary           Patient Acceptance, E, NR by  at 3/2/2023 1035                   Point: Body mechanics (In Progress)     Description:   Instruct  learner(s) on proper positioning and spine alignment during self-care, functional mobility activities and/or exercises.              Learning Progress Summary           Patient Acceptance, E, NR by  at 3/2/2023 1035                               User Key     Initials Effective Dates Name Provider Type Discipline     10/14/22 -  Natalie Mack OT Occupational Therapist OT              OT Recommendation and Plan  Planned Therapy Interventions (OT): activity tolerance training, adaptive equipment training, BADL retraining, functional balance retraining, IADL retraining, occupation/activity based interventions, ROM/therapeutic exercise, strengthening exercise, transfer/mobility retraining, patient/caregiver education/training, edema control/reduction  Therapy Frequency (OT): daily  Plan of Care Review  Plan of Care Reviewed With: patient  Outcome Evaluation: Pt presents w/ increased pain, decreased skin integrity, generalized weakness, decreased functional endurance, and balance deficits limiting her ADL independence. Pt would benefit from continued skilled IPOT services to address current functional deficits. Rec SNF at d/c.     Time Calculation:    Time Calculation- OT     Row Name 03/02/23 1037             Time Calculation-     OT Start Time 0808  -      OT Received On 03/02/23  -      OT Goal Re-Cert Due Date 03/12/23  -         Timed Charges    99291 - OT Therapeutic Exercise Minutes 8  -      30837 - OT Therapeutic Activity Minutes 6  -      70817 - OT Self Care/Mgmt Minutes 3  -         Untimed Charges    OT Eval/Re-eval Minutes 31  -         Total Minutes    Timed Charges Total Minutes 17  -      Untimed Charges Total Minutes 31  -       Total Minutes 48  -            User Key  (r) = Recorded By, (t) = Taken By, (c) = Cosigned By    Initials Name Provider Type     Natalie Mack OT Occupational Therapist              Therapy Charges for Today     Code Description Service  Date Service Provider Modifiers Qty    [id number]  OT THER PROC EA 15 MIN 3/2/2023 Natalie Mack OT GO 1    22096718010 HC OT EVAL MOD COMPLEXITY 3 3/2/2023 Natalie Mack OT GO 1               Natalie Mack OT  3/2/2023

## 2023-03-02 NOTE — CASE MANAGEMENT/SOCIAL WORK
Discharge Planning Assessment  [facility]     Patient Name: Sol Lovelace  MRN: [id number]  Today's Date: 3/2/2023    Admit Date: 3/1/2023    Plan: Home with family   Discharge Needs Assessment     Row Name 03/02/23 1204       Living Environment    People in Home child(tre), adult;spouse    Current Living Arrangements home    Primary Care Provided by self       Resource/Environmental Concerns    Resource/Environmental Concerns none       Transition Planning    Transportation Anticipated health plan transportation       Discharge Needs Assessment    Equipment Currently Used at Home bp cuff;walker, rolling;lift device    Equipment Needed After Discharge hospital bed               Discharge Plan     Row Name 03/02/23 1146       Plan    Plan Home with family    Patient/Family in Agreement with Plan yes    Plan Comments Spoke with daughter by phone and spoke with patient at bedside. Patient lives with  and daughter in [facility]. Patient daughter helps with ADL's. Has cane, wheelchair, and chair lift. Daughter states that they need a hospital bed. PCP is [facility]. Insurance is Medicare A and B and [place] [employer] and [employer]. We discussed discharge plan. Daughter would like home with hospice. Will likely need transport. CM will follow.    Final Discharge Disposition Code 50 - home with hospice              Continued Care and Services - Admitted Since 3/1/2023     Destination     Service Provider Request Status Selected Services Address Phone Fax Patient Preferred    [facility] Pending - Request Sent N/A [address] [facility] [id number] [phone] --              Expected Discharge Date and Time     Expected Discharge Date Expected Discharge Time    Mar 6, 2023          Demographic Summary     Row Name 03/02/23 1201       General Information    Admission Type inpatient    Preferred Language English               Functional Status     Row Name 03/02/23 1205        Functional Status    Usual Activity Tolerance poor    Current Activity Tolerance poor       Functional Status, IADL    Medications independent;assistive person    Meal Preparation assistive equipment and person    Housekeeping assistive equipment and person    Laundry assistive equipment and person    Shopping assistive equipment and person               Psychosocial    No documentation.                Abuse/Neglect    No documentation.                Legal    No documentation.                Substance Abuse    No documentation.                Patient Forms    No documentation.                   Michelle Chu RN

## 2023-03-02 NOTE — PLAN OF CARE
Goal Outcome Evaluation:  Plan of Care Reviewed With: patient           Outcome Evaluation: Pt demonstrates decreased strength, balance, and endurance below baseline contributing to impairments in bed mobility, transfers, and overall fxnl mobility. Pt will benefit from PT to address aforementioned deficits and return to PLOF.

## 2023-03-02 NOTE — PROGRESS NOTES
[facility] Medicine Services  PROGRESS NOTE    Patient Name: Sol Lovelace  : 1940  MRN: [id number]    Date of Admission: 3/1/2023  Primary Care Physician: Young Huynh MD    Subjective   Subjective     CC:  diarrhea    HPI:  Patient reports sleeping very well overnight- only complaint is of dry mouth  Reports significant diarrhea before admission, no BM while here yet  Wounds on buttocks    ROS:  Gen- No fevers, chills  CV- No chest pain, palpitations  Resp- No cough, dyspnea  GI- No N/V/D, abd pain    Objective   Objective     Vital Signs:   Temp:  [96.5 °F (35.8 °C)-97.7 °F (36.5 °C)] 97.5 °F (36.4 °C)  Heart Rate:  [] 84  Resp:  [16-20] 18  BP: ()/(57-71) 105/71     Physical Exam:  Constitutional: Chronically ill appearing female lying in bed  HENT: NCAT, mucous membranes moist  Respiratory: Clear to auscultation bilaterally, respiratory effort normal   Cardiovascular: RRR, no murmurs, rubs, or gallops  Gastrointestinal: Soft, nontender, nondistended  Musculoskeletal: Muscle tone within normal limits, no joint effusions appreciated  Psychiatric: Appropriate affect, cooperative  Neurologic: Alert and oriented, facial movements symmetric and spontaneous movement of all 4 extremities grossly equal bilaterally, speech clear  Skin: Sacral wounds that appear 2/2 pressure, no significant cellulitis given known pressure  : gerber in place w light yellow urine    Results Reviewed:  LAB RESULTS:      Lab 23  0250 23  0012 23   WBC 11.58*  --   --  12.57*   HEMOGLOBIN 12.5  --   --  12.9   HEMATOCRIT 39.4  --   --  41.5   PLATELETS 153  --   --  164   NEUTROS ABS 9.31*  --   --  10.47*   IMMATURE GRANS (ABS) 0.08*  --   --  0.08*   LYMPHS ABS 1.52  --   --  1.44   MONOS ABS 0.52  --   --  0.50   EOS ABS 0.11  --   --  0.03   MCV 92.5  --   --  91.4   PROCALCITONIN  --   --   --  0.33*   LACTATE 3.1* 2.9* 3.2*  --          Lab  03/02/23  0250 03/01/23 2106 03/01/23 2105   SODIUM 132*  --  133*   POTASSIUM 3.1*  --  2.9*   CHLORIDE 97*  --  96*   CO2 20.0*  --  22.0   ANION GAP 15.0  --  15.0   BUN 18  --  18   CREATININE 0.32*  --  0.53*   EGFR 105.7  --  93.6   GLUCOSE 93  --  91   CALCIUM 7.5*  --  8.2*   MAGNESIUM 1.4*  --  1.9   HEMOGLOBIN A1C  --  5.70*  --    TSH  --   --  5.390*         Lab 03/01/23 2105   TOTAL PROTEIN 5.3*   ALBUMIN 2.5*   GLOBULIN 2.8   ALT (SGPT) 15   AST (SGOT) 25   BILIRUBIN 0.9   ALK PHOS 69         Lab 03/02/23  0012 03/01/23 2105   HSTROP T 48* 67*                 Brief Urine Lab Results  (Last result in the past 365 days)      Color   Clarity   Blood   Leuk Est   Nitrite   Protein   CREAT   Urine HCG        03/01/23 2327 Dark Yellow   Turbid   Trace   Moderate (2+)   Negative   100 mg/dL (2+)                 Microbiology Results Abnormal     None          No radiology results from the last 24 hrs    Results for orders placed during the hospital encounter of 10/18/22    Adult Transthoracic Echo Complete W/ Cont if Necessary Per Protocol    Interpretation Summary  •  Estimated left ventricular EF = 55% Left ventricular systolic function is normal.  •  Estimated right ventricular systolic pressure from tricuspid regurgitation is normal (<35 mmHg). Calculated right ventricular systolic pressure from tricuspid regurgitation is 28 mmHg.      Current medications:  Scheduled Meds:apixaban, 5 mg, Oral, Q12H  atorvastatin, 20 mg, Oral, Nightly  bisoprolol, 10 mg, Oral, Q24H  budesonide-formoterol, 2 puff, Inhalation, BID - RT  cefTRIAXone, 2 g, Intravenous, Q24H  magnesium sulfate, 2 g, Intravenous, Q2H  potassium chloride, 40 mEq, Oral, Q4H  sodium chloride, 10 mL, Intravenous, Q12H  vancomycin, 10.3 mg/kg, Intravenous, Q12H      Continuous Infusions:lactated ringers, 75 mL/hr, Last Rate: 75 mL/hr (03/02/23 0418)  Pharmacy to dose vancomycin,       PRN Meds:.•  acetaminophen  •  albuterol  •  Calcium  Replacement - Initiate Nurse / BPA Driven Protocol  •  HYDROcodone-acetaminophen  •  Magnesium Standard Dose Replacement - Initiate Nurse / BPA Driven Protocol  •  Pharmacy to dose vancomycin  •  Phosphorus Replacement - Initiate Nurse / BPA Driven Protocol  •  Potassium Replacement - Initiate Nurse / BPA Driven Protocol  •  sodium chloride  •  sodium chloride    Assessment & Plan   Assessment & Plan     Active Hospital Problems    Diagnosis  POA   • **Diarrhea [R19.7]  Yes   • Buttock wound [S31.019A]  Yes   • Chronic indwelling Gerber catheter [Z97.8]  Not Applicable   • Current chronic use of systemic steroids [Z79.52]  Not Applicable   • Adrenal insufficiency due to corticosteroid withdrawal ([facility]) [E27.3, T38.0X5A]  Yes   • Severe obesity (BMI 35.0-35.9 with comorbidity) ([facility]) [E66.01, Z68.35]  Not Applicable   • Paroxysmal atrial fibrillation ([facility]) [I48.0]  Yes   • Essential hypertension [I10]  Yes      Resolved Hospital Problems   No resolved problems to display.        Brief Hospital Course to date:  Sol Lovelace is a 82 y.o. female w adrenal insufficiency 2/2 chronic steroid use, debility and bedbound status, urinary retention requiring gerber who presents w profuse diarrhea x days. Found to have sacral wounds on examination.    Severe sepsis 2/2 diarrheal illness w lactic acidosis  - GI PCR, C diff (multiple recent abx courses), precautions  - since arriving here, no BMs x 12 hours. Will closely monitor    MASD, incontinence associated skin damage  -no s/s of fistula or deeper wound  -OK for rectal tube if profuse diarrhea restarts, but currently none  -d/w wound care on this date  -ID consulted for these complex wounds, but does not appear to have significant cellulitis component     Chronic steroids use w adrenal insufficiency  -chronic prednisone 10 mg daily. H/o hyponatremia in past off steroids  -stress dose steroids if hypotension despite IVF    Pyuria in setting of chronic Gerber - on vanc/ceftriaxone  now. urine culture pending. Has been treated for E coli x 2, would not treat if shown again given likely colonization    Poss PNA ? - seen on CT a/p, CXR now     Afib -rate controled, cont eliquis and bisoprolol for rate control  Relative hypotension in setting of chronic HTN - hold nifedipine  Asthma -cont advair  HLD -cont lipitor  Chronic debility, BMI 37 - complicates daily care  Hypokalemia, likely 2/2 diarrhea - replace per protocol  Hypomagnesemia - replace per protocol    D/w daughter and patient 3/2, know them from prior 10/2022 admission. Overall, patient remains bedbound, poorly mobile and no significant rehab (or patient motivation to eat/ambulat etc). Worry prognosis is <6 months. Family interested in home hospice from here if qualify given prognosis    Expected Discharge Location and Transportation: home w home hospice supprot  Expected Discharge 3/6 (Discharge date is tentative pending patient's medical condition and is subject to change)  Expected Discharge Date and Time     Expected Discharge Date Expected Discharge Time    Mar 6, 2023            DVT prophylaxis:  Medical DVT prophylaxis orders are present.          CODE STATUS:   Code Status and Medical Interventions:   Ordered at: 03/02/23 0004     Code Status (Patient has no pulse and is not breathing):    CPR (Attempt to Resuscitate)     Medical Interventions (Patient has pulse or is breathing):    Full Support     Release to patient:    Routine Release       Selene Jefferson MD  03/02/23

## 2023-03-02 NOTE — NURSING NOTE
"                             Wound, Ostomy and Continence (WOC) Note    Reason for WOC Consultation: \"Wounds on buttock\"     Patient awake, sitting up in chair.  Family/support person not present.   With patient care tech present, lifted patient back to her bed for assessment of wounds.    Skin/Wound Assessment:     Wound Type: MASD (Moisture associated skin damage) - Incontinence Associated Dermatitis (IAD) with likely pressure component noted at the left ischial tuberosity and bilateral gluteals  Measurements: Left ischial tuberosity 3 x 6 x 0.3 cm  Wound Bed: blanchable, non-blanchable, dermis, moist, pink, slough and yellow  Wound Edges: Irregular and Open  Periwound Skin: intact, blanchable, excoriated and denuded   Drainage Characteristics/Odor: none  Drainage Amount: none  Pain: Yes   Care provided: The wound was cleansed with wound cleanser and barrier wipes.  Z guard applied.  While assessing patient's wounds patient had a large liquid green bowel movement that required repeated cleansing of the patient's wound.  Image:             Summary and Recommendation(s):     -Patient has significant incontinence associated skin damage with full-thickness skin loss at her left ischial tuberosity and bilateral medial gluteals. There is likely a pressure related component to these injuries.  -Unable to probe entire depth of left ischial wound bed due to presence of this fibrous slough.    -CT to eval suspected fistula/depth (per md note)?  -Due to advanced nature of her mixed incontinence/pressure related wounds will order Desitin cream to provide additional barrier protection.  Patient may also benefit from \"crusting technique\" prior to application of Desitin.  -Would recommend placement of a fecal management system (FMS) to manage and contain liquid, green diarrhea to allow wounds to heal.  - Refer to wound care instructions in the \"Orders\" tab  - Specialty support surface in place: Foam Mattress with Waffle Overlay  "     -Will order Low Air Loss (TEX) Mattress from [employer]      Pressure Injury Prevention Measures (initiate for a Ld Scale Score of <18):     Most recent Ld Scale score:  Sensory Perception: 2-->very limited  Moisture: 4-->rarely moist  Activity: 2-->chairfast  Mobility: 2-->very limited  Nutrition: 3-->adequate  Friction and Shear: 1-->problem  Ld Score: 14 (03/02/23 0800)     -Turn q 2 hr. using an offloading foam wedge, keep heels elevated and offloaded with offloading heel boots.    -Raise knee-gatch before elevating HOB to reduce shearing   -Follow C.A.R.E protocol if medical devices (Bipap, gerber, Ng tube, etc) are being used.  -Apply moisture barrier cream to bottom BID & PRN, if incontinent.  -If incontinent, consider applying an external catheter. External catheters are finicky and should be checked every few hours for placement.   -Clean skin gently with no-rinse PH-balanced foam cleanser and a soft, disposable cloth (barrier wipes-blue pack).   -Reduce layers under patient (one sheet as drawsheet and two incontinence pads)    Thank you for consulting the WOC Nurse.  WOC Team will continue to follow.  Please re consult if the wound(s) worsens.     Stephen Upton RN, BSN, CCRN, CWOCN  Wound, Ostomy and Continence (WOC) Department  [facility]

## 2023-03-02 NOTE — CONSULTS
Continued Stay Note  [facility]     Patient Name: Sol Lovelace  MRN: [id number]  Today's Date: 3/2/2023    Admit Date: 3/1/2023    Plan: Home with [facility]   Discharge Plan     Row Name 03/02/23 1722       Plan    Plan Home with [facility]    Plan Comments Hospice referral received, chart reviewed. Spoke with Dr. Jefferson regarding pt's current condition, Dr. Jefferson placed the hospice referral. Visit made to pt. pt appeared to be sleeping, no family present. Telephone call to pt's daughter Stephanie regarding the hospice referral. Stephanie stated is aware of the referral, stated does understand hospice services. Reviewed hospice services, goals of care, Stephanie stated wants to bring pt home and focus on comfort measures, does not want any further hospitalization for pt. Stephanie stated does want hospice services at home when pt is medically ready for discharge. Secure chat message sent to Dr. Jefferson regarding pt's decision for hospice. Will continue to follow. Please call [phone] if can be of further assistance.               Discharge Codes    No documentation.               Expected Discharge Date and Time     Expected Discharge Date Expected Discharge Time    Mar 6, 2023             Marcy Ramirez RN

## 2023-03-03 LAB
ANION GAP SERPL CALCULATED.3IONS-SCNC: 7 MMOL/L (ref 5–15)
BACTERIA UR QL AUTO: ABNORMAL /HPF
BACTERIA UR QL AUTO: ABNORMAL /HPF
BILIRUB UR QL STRIP: NEGATIVE
BILIRUB UR QL STRIP: NEGATIVE
BUN SERPL-MCNC: 10 MG/DL (ref 8–23)
BUN/CREAT SERPL: 38.5 (ref 7–25)
CALCIUM SPEC-SCNC: 7.6 MG/DL (ref 8.6–10.5)
CHLORIDE SERPL-SCNC: 102 MMOL/L (ref 98–107)
CLARITY UR: ABNORMAL
CLARITY UR: ABNORMAL
CO2 SERPL-SCNC: 24 MMOL/L (ref 22–29)
COLOR UR: ABNORMAL
COLOR UR: ABNORMAL
CREAT SERPL-MCNC: 0.26 MG/DL (ref 0.57–1)
DEPRECATED RDW RBC AUTO: 54.2 FL (ref 37–54)
EGFRCR SERPLBLD CKD-EPI 2021: 111.2 ML/MIN/1.73
ERYTHROCYTE [DISTWIDTH] IN BLOOD BY AUTOMATED COUNT: 16.9 % (ref 12.3–15.4)
GLUCOSE SERPL-MCNC: 72 MG/DL (ref 65–99)
GLUCOSE UR STRIP-MCNC: NEGATIVE MG/DL
GLUCOSE UR STRIP-MCNC: NEGATIVE MG/DL
HCT VFR BLD AUTO: 31.4 % (ref 34–46.6)
HGB BLD-MCNC: 9.8 G/DL (ref 12–15.9)
HGB UR QL STRIP.AUTO: ABNORMAL
HGB UR QL STRIP.AUTO: ABNORMAL
HYALINE CASTS UR QL AUTO: ABNORMAL /LPF
HYALINE CASTS UR QL AUTO: ABNORMAL /LPF
KETONES UR QL STRIP: ABNORMAL
KETONES UR QL STRIP: ABNORMAL
LEUKOCYTE ESTERASE UR QL STRIP.AUTO: ABNORMAL
LEUKOCYTE ESTERASE UR QL STRIP.AUTO: ABNORMAL
MCH RBC QN AUTO: 27.8 PG (ref 26.6–33)
MCHC RBC AUTO-ENTMCNC: 31.2 G/DL (ref 31.5–35.7)
MCV RBC AUTO: 89 FL (ref 79–97)
NITRITE UR QL STRIP: NEGATIVE
NITRITE UR QL STRIP: NEGATIVE
PH UR STRIP.AUTO: 6 [PH] (ref 5–8)
PH UR STRIP.AUTO: 6 [PH] (ref 5–8)
PLATELET # BLD AUTO: 163 10*3/MM3 (ref 140–450)
PMV BLD AUTO: 10.2 FL (ref 6–12)
POTASSIUM SERPL-SCNC: 3.8 MMOL/L (ref 3.5–5.2)
PROT UR QL STRIP: ABNORMAL
PROT UR QL STRIP: ABNORMAL
RBC # BLD AUTO: 3.53 10*6/MM3 (ref 3.77–5.28)
RBC # UR STRIP: ABNORMAL /HPF
RBC # UR STRIP: ABNORMAL /HPF
REF LAB TEST METHOD: ABNORMAL
REF LAB TEST METHOD: ABNORMAL
SODIUM SERPL-SCNC: 133 MMOL/L (ref 136–145)
SP GR UR STRIP: 1.02 (ref 1–1.03)
SP GR UR STRIP: 1.02 (ref 1–1.03)
SQUAMOUS #/AREA URNS HPF: ABNORMAL /HPF
SQUAMOUS #/AREA URNS HPF: ABNORMAL /HPF
UROBILINOGEN UR QL STRIP: ABNORMAL
UROBILINOGEN UR QL STRIP: ABNORMAL
WBC # UR STRIP: ABNORMAL /HPF
WBC # UR STRIP: ABNORMAL /HPF
WBC NRBC COR # BLD: 7.32 10*3/MM3 (ref 3.4–10.8)

## 2023-03-03 PROCEDURE — 94799 UNLISTED PULMONARY SVC/PX: CPT

## 2023-03-03 PROCEDURE — 80048 BASIC METABOLIC PNL TOTAL CA: CPT

## 2023-03-03 PROCEDURE — 63710000001 PREDNISONE PER 5 MG: Performed by: INTERNAL MEDICINE

## 2023-03-03 PROCEDURE — 87086 URINE CULTURE/COLONY COUNT: CPT | Performed by: INTERNAL MEDICINE

## 2023-03-03 PROCEDURE — 85027 COMPLETE CBC AUTOMATED: CPT | Performed by: INTERNAL MEDICINE

## 2023-03-03 PROCEDURE — 99233 SBSQ HOSP IP/OBS HIGH 50: CPT | Performed by: INTERNAL MEDICINE

## 2023-03-03 PROCEDURE — 25010000002 CEFTRIAXONE PER 250 MG: Performed by: INTERNAL MEDICINE

## 2023-03-03 PROCEDURE — 94761 N-INVAS EAR/PLS OXIMETRY MLT: CPT

## 2023-03-03 PROCEDURE — 81001 URINALYSIS AUTO W/SCOPE: CPT | Performed by: INTERNAL MEDICINE

## 2023-03-03 RX ORDER — VANCOMYCIN HYDROCHLORIDE 125 MG/1
125 CAPSULE ORAL EVERY 6 HOURS SCHEDULED
Status: DISCONTINUED | OUTPATIENT
Start: 2023-03-03 | End: 2023-03-03

## 2023-03-03 RX ADMIN — Medication 1 APPLICATION: at 08:30

## 2023-03-03 RX ADMIN — ACETAMINOPHEN 325MG 650 MG: 325 TABLET ORAL at 22:17

## 2023-03-03 RX ADMIN — VANCOMYCIN HYDROCHLORIDE 125 MG: 125 CAPSULE ORAL at 12:14

## 2023-03-03 RX ADMIN — ACETAMINOPHEN 325MG 650 MG: 325 TABLET ORAL at 09:23

## 2023-03-03 RX ADMIN — Medication 10 ML: at 08:29

## 2023-03-03 RX ADMIN — BUDESONIDE AND FORMOTEROL FUMARATE DIHYDRATE 2 PUFF: 160; 4.5 AEROSOL RESPIRATORY (INHALATION) at 20:18

## 2023-03-03 RX ADMIN — Medication 1 APPLICATION: at 22:00

## 2023-03-03 RX ADMIN — CEFTRIAXONE 2 G: 2 INJECTION, POWDER, FOR SOLUTION INTRAMUSCULAR; INTRAVENOUS at 23:43

## 2023-03-03 RX ADMIN — SODIUM CHLORIDE, POTASSIUM CHLORIDE, SODIUM LACTATE AND CALCIUM CHLORIDE 75 ML/HR: 600; 310; 30; 20 INJECTION, SOLUTION INTRAVENOUS at 01:13

## 2023-03-03 RX ADMIN — VANCOMYCIN HYDROCHLORIDE 125 MG: 125 CAPSULE ORAL at 01:12

## 2023-03-03 RX ADMIN — BISOPROLOL FUMARATE 10 MG: 5 TABLET, FILM COATED ORAL at 08:29

## 2023-03-03 RX ADMIN — APIXABAN 5 MG: 5 TABLET, FILM COATED ORAL at 08:29

## 2023-03-03 RX ADMIN — PREDNISONE 10 MG: 10 TABLET ORAL at 08:29

## 2023-03-03 RX ADMIN — VANCOMYCIN HYDROCHLORIDE 125 MG: 125 CAPSULE ORAL at 06:19

## 2023-03-03 RX ADMIN — ATORVASTATIN CALCIUM 20 MG: 20 TABLET, FILM COATED ORAL at 21:59

## 2023-03-03 RX ADMIN — BUDESONIDE AND FORMOTEROL FUMARATE DIHYDRATE 2 PUFF: 160; 4.5 AEROSOL RESPIRATORY (INHALATION) at 09:02

## 2023-03-03 RX ADMIN — APIXABAN 5 MG: 5 TABLET, FILM COATED ORAL at 21:59

## 2023-03-03 RX ADMIN — ACETAMINOPHEN 325MG 650 MG: 325 TABLET ORAL at 01:12

## 2023-03-03 RX ADMIN — Medication 10 ML: at 21:59

## 2023-03-03 NOTE — PROGRESS NOTES
[facility] Progress Note    Admission Date: 3/1/2023    Sol Lovelace  1940  [id number]    Date: 3/3/2023    Antibiotics:  Anti-Infectives (From admission, onward)    Ordered     Dose/Rate Route Frequency Start Stop    03/03/23 0032  vancomycin (VANCOCIN) capsule 125 mg        Ordering Provider: Raina Fontaine APRN    125 mg Oral Every 6 Hours Scheduled 03/03/23 0130 03/12/23 2359    03/01/23 2250  vancomycin IVPB 2000 mg in 0.9% Sodium Chloride (premix) 500 mL        Ordering Provider: Josafat Lujan PharmD    20 mg/kg × 97.1 kg  over 120 Minutes Intravenous Once 03/01/23 2345 03/02/23 0204    03/01/23 2235  cefTRIAXone (ROCEPHIN) 2 g/100 mL 0.9% NS IVPB (MBP)        Ordering Provider: Judith Tracey MD    2 g  over 30 Minutes Intravenous Every 24 Hours Scheduled 03/01/23 2245 03/06/23 2159        Reason for Consultation: uti, perianal wounds     History of present illness:    Patient is a 82 y.o. female with multiple medical problems including Rheumatoid arthritis, chronic back pain, immobility and urinary retention requiring gerber catheterization. She was referred for admission from the Urology clinic after missing multiple appointments for catheter exchange and discussion of suprapubic catheter placement. At the clinic she was encephalopathic, had purulent urine and had multiple wounds on her perineum and gluteal area. Evidently she has chronic diarrhea and incontinence. She was referred for admission. WBC 12.57, procalcitonin 0.33, UA with TNTC WBC. Urine and blood cultures are pending. She was started on ceftriaxone. She has a history of an Ecoli uti 6 weeks ago with a sensitive Ecoli treated with cipro.  No family members are at the bedside and she is unable to provide additional history Per RN she does not appear to have severe diarrhea    3/3/23 Afebrile, alert, no new c/o  States that she is able to eat w/o problems  According to RN she is much more cooperative today   Urine discarded by lab  because of multiple moira despite UA with TNTC WBC Stool sent yesterday for GI panel and cdiff - negative       Full 12 point review of systems reviewed and negative otherwise for acute complaints,       PE:  Vital Signs  Temp  Min: 95.4 °F (35.2 °C)  Max: 97.5 °F (36.4 °C)  BP  Min: 106/60  Max: 129/66  Pulse  Min: 60  Max: 93  Resp  Min: 16  Max: 18  SpO2  Min: 88 %  Max: 100 %  GENERAL: alert, cooperative  HEENT: Normocephalic, atraumatic.EOMI. No conjunctival injection. No icterus. Oropharynx poorly visualized.     NECK: Supple without nuchal rigidity. No masses.  LYMPH: No cervical lymphadenopathy.  HEART: RRR; No murmur, rubs, gallops.   LUNGS: No wheezing, rales, rhonchi. Poor respiratory effort. Nonlabored.   ABDOMEN: Soft, nontender, nondistended. Positive bowel sounds. No rebound or guarding.  EXT:  trace- 1+ edema  :  With Ayala catheter. Pictures provided by the WOCN showed multiple wounds with full-thickness skin loss at her left ischial tuberosity and bilateral medial gluteals. The left ischial wound could not be fully probed because of abundant fibrous slough  MSK: No joint effusions or erythema; multiple small ecchymoses    NEURO: Oriented to person and place.  Motor strength not assessed because of diffuse pain    Laboratory Data    Results from last 7 days   Lab Units 03/03/23  0424 03/02/23  0250 03/01/23  2105   WBC 10*3/mm3 7.32 11.58* 12.57*   HEMOGLOBIN g/dL 9.8* 12.5 12.9   HEMATOCRIT % 31.4* 39.4 41.5   PLATELETS 10*3/mm3 163 153 164     Results from last 7 days   Lab Units 03/03/23  0424   SODIUM mmol/L 133*   POTASSIUM mmol/L 3.8   CHLORIDE mmol/L 102   CO2 mmol/L 24.0   BUN mg/dL 10   CREATININE mg/dL 0.26*   GLUCOSE mg/dL 72   CALCIUM mg/dL 7.6*     Results from last 7 days   Lab Units 03/01/23  2105   ALK PHOS U/L 69   BILIRUBIN mg/dL 0.9   ALT (SGPT) U/L 15   AST (SGOT) U/L 25               Estimated Creatinine Clearance: 191.5 mL/min (A) (by C-G formula based on SCr of 0.26 mg/dL  (L)).      Microbiology:  Urine discarded as above; repeat culture sent    Radiology:  Imaging Results (Last 72 Hours)     Procedure Component Value Units Date/Time    XR Chest 1 View [[id number]] Collected: 03/02/23 1803     Updated: 03/02/23 1809    Narrative:      XR CHEST 1 VW    Date of Exam: 3/2/2023 5:44 PM EST    Indication: pna on CT a/p?.    Comparison: Abdomen pelvis CT scan of same date. Portable chest radiograph of 10/25/2022    Findings:  Heart shadow is of normal size. The vasculature is cephalized, with appearance of mild perihilar edema. There is only mild visible left basilar discoid atelectasis and effusion. No pneumothorax is seen. There is a marked dextroconvex thoracic scoliosis.          Impression:      Impression:    1. Mild pulmonary vascular congestion.  2. Mild left basilar atelectasis and trace effusion, less apparent than seen on earlier CT scan.    Electronically Signed: Silas Dubose    3/2/2023 6:06 PM EST    Workstation ID: [id number]    CT Abdomen Pelvis With & Without Contrast [[id number]] Collected: 03/02/23 1523     Updated: 03/02/23 1536    Narrative:      CT ABDOMEN PELVIS W WO CONTRAST    Date of Exam: 3/2/2023 2:46 PM EST    Indication: UTI, recurrent/complicated (Female)  Sepsis  complicated sacral wound- eval for fistula.    Comparison: None available.    Technique: Axial CT images were obtained of the abdomen and pelvis before and after the uneventful intravenous administration of 95 mL Isovue-300. Sagittal and coronal reconstructions were performed.  Automated exposure control and iterative   reconstruction methods were used.     FINDINGS:  Bibasilar infiltrates are noted. There is a small left pleural effusion. The findings suggest changes of bibasilar pneumonia.     The liver, spleen, and pancreas are unremarkable. The gallbladder and bile ducts are unremarkable. The bilateral adrenal glands are symmetric and unremarkable. The bilateral kidneys are symmetric and  unremarkable.    There is no bowel dilatation or obstruction. A normal-appearing appendix is observed. There is no evidence for acute appendicitis. No significant free fluid is observed. No abnormal fluid collections are identified. No significant lymphadenopathy is seen   throughout the abdomen or pelvis. The bladder is decompressed with a Gerber catheter in place. The celiac and superior mesenteric arterial distributions are opacified without evidence for occlusion.    There is abnormal attenuation involving the soft tissues of the left buttock. The findings suggest changes of a developing decubitus wound. There may be associated soft tissue cellulitis. No well-defined fluid collection is seen to suggest abscess. There   is additional focal edema within the subcutaneous soft tissues at the left lateral aspect of the abdomen and pelvis. The findings may indicate an additional area of soft tissue cellulitis. Again no definitive well-defined fluid collection is seen to   suggest abscess.    No acute osseous abnormalities are observed.      Impression:      1.Evidence for bibasilar pneumonia.  2.Evidence for a decubitus wound involving the left buttock soft tissues. There is evidence for associated soft tissue cellulitis. No definitive abscess is seen. There is no evidence for fistula.  3.Evidence for abnormal edema within the subcutaneous soft tissues overlying the left lateral aspect of the abdomen and pelvis. The findings may indicate changes of soft tissue cellulitis. No focal abscess is seen.    Electronically Signed: Henry Dee    3/2/2023 3:33 PM EST    Workstation ID: [id number]          I personally reviewed the radiographic studies   Impression:      -- Complicated uti in patient who requires chronic gerber catheter Based on prior cultures she does not appear to be at high risk for MDR pathogens     -- Multiple perineal and gluteal wounds which are unstageable and resulting from chronic incontinence and  pressure      -- Urinary retention requiring chronic catheterization     -- Chronic pain that precludes cooperation with wound care, mobilization etc     -- Diarrhea, severity unclear and may be difficult to evaluate in the presence of fecal incontinence  Per RN she has not produced a specimen that can be submitted for studies such as cdiff     -- Chronic steroid use     -- Prior diagnosis of Rheumatoid Arthritis, data deficit     --BMI 37.9 which complicates management of diarrhea and skin care        PLAN/RECOMMENDATIONS:   Thank you for asking us to see Sol Lovelace, I recommend the following:      -- try again to get urine culture- mainly to ensure that she is not infected with a bacteria R to ceftriaxone  -- Agree with ceftriaxone while repeat urine culture pending    -- stool studies ordered- negative  --  hospice following     Discussed with RN        Raquel Anderson MD  3/3/2023

## 2023-03-03 NOTE — PLAN OF CARE
Goal Outcome Evaluation:   VSS on 2L NC. Pt had no complaints of pain throughout the day. Incontinence care provided per order and turned q2h. Pt states that diarrhea is improving. Will continue current POC.         Progress: no change

## 2023-03-03 NOTE — PROGRESS NOTES
Continued Stay Note  [facility]     Patient Name: Sol Lovelace  MRN: [id number]  Today's Date: 3/3/2023    Admit Date: 3/1/2023    Plan: Skilled rehab if eligible   Discharge Plan     Row Name 03/03/23 1253       Plan    Plan Skilled rehab if eligible    Plan Comments Visit and note made by EDDIE Longo RN, hospice liaison. Hospice visit made. Patient lying in bed awake, appears comfortable. No family present. Patient reports feeling better. Discussed with patient what plan is once medically ready for discharge and patient stated that she was interested in rehab to get strength back. Informed patient that hospital liaison would touch base with her daughter and inform case management that patient is interested in rehab. Call made to patient's daughter Stephanie [phone]. Discussed with Stephanie that patient is interested in rehab and Stephanie supports the patient's decision. Stephanie is agreeable to hospice follow up call 2 weeks after discharging from hospital. Provided Stephanie with hospice 24 hour number should she have any questions/concerns or need hospice services sooner. This writer attempted to call  Alexandre regarding pt's and daughter's plan of care. Message left to call this writer. Will continue to follow. Please call [phone] if can be of further assistance.               Discharge Codes    No documentation.               Expected Discharge Date and Time     Expected Discharge Date Expected Discharge Time    Mar 6, 2023             Marcy Ramirez RN

## 2023-03-03 NOTE — PROGRESS NOTES
[facility] Medicine Services  PROGRESS NOTE    Patient Name: Sol Lovelace  : 1940  MRN: [id number]    Date of Admission: 3/1/2023  Primary Care Physician: Young Huynh MD    Subjective   Subjective     CC:  diarrhea    HPI:  Patient reports having no bowel movements since before bed (nursing reports 1 loose BM this morning) and sleeping well. Feels overall much better with no complaints    ROS:  Gen- No fevers, chills  CV- No chest pain, palpitations  Resp- No cough, dyspnea    Objective   Objective     Vital Signs:   Temp:  [95.4 °F (35.2 °C)-97.5 °F (36.4 °C)] 96.4 °F (35.8 °C)  Heart Rate:  [60-93] 68  Resp:  [16-18] 16  BP: (106-129)/(60-78) 129/66  Flow (L/min):  [2] 2     Physical Exam:  Constitutional: Chronically ill appearing female lying in bed. Holds my hand when we speak  HENT: NCAT, mucous membranes moist  Respiratory: Clear to auscultation bilaterally, respiratory effort normal   Cardiovascular: RRR, no murmurs, rubs, or gallops  Gastrointestinal: Soft, nontender, nondistended  Musculoskeletal: Muscle tone within normal limits, no joint effusions appreciated  Psychiatric: Appropriate affect, cooperative  Neurologic: Alert and oriented, facial movements symmetric and spontaneous movement of all 4 extremities grossly equal bilaterally, speech clear  Skin: Easy bruising superficially, wounds not examined today  : gerber in place w light yellow urine    Results Reviewed: CXR personally reviewed and interpreted: new small left pleural effusion, very minimal pulm edema  LAB RESULTS:      Lab 23  0424 23  1937 23  1229 23  0703 23  0250 23  0012 23  21023   WBC 7.32  --   --   --  11.58*  --   --  12.57*   HEMOGLOBIN 9.8*  --   --   --  12.5  --   --  12.9   HEMATOCRIT 31.4*  --   --   --  39.4  --   --  41.5   PLATELETS 163  --   --   --  153  --   --  164   NEUTROS ABS  --   --   --   --  9.31*  --   --  10.47*    IMMATURE GRANS (ABS)  --   --   --   --  0.08*  --   --  0.08*   LYMPHS ABS  --   --   --   --  1.52  --   --  1.44   MONOS ABS  --   --   --   --  0.52  --   --  0.50   EOS ABS  --   --   --   --  0.11  --   --  0.03   MCV 89.0  --   --   --  92.5  --   --  91.4   PROCALCITONIN  --   --   --   --   --   --   --  0.33*   LACTATE  --  1.5 2.2* 3.3* 3.1* 2.9*   < >  --     < > = values in this interval not displayed.         Lab 03/03/23  0424 03/02/23  1229 03/02/23  0250 03/02/23  0012 03/01/23 2106 03/01/23 2105   SODIUM 133*  --  132*  --   --  133*   POTASSIUM 3.8 4.1 3.1*  --   --  2.9*   CHLORIDE 102  --  97*  --   --  96*   CO2 24.0  --  20.0*  --   --  22.0   ANION GAP 7.0  --  15.0  --   --  15.0   BUN 10  --  18  --   --  18   CREATININE 0.26*  --  0.32*  --   --  0.53*   EGFR 111.2  --  105.7  --   --  93.6   GLUCOSE 72  --  93  --   --  91   CALCIUM 7.6*  --  7.5*  --   --  8.2*   MAGNESIUM  --   --  1.4* 1.6  --  1.9   HEMOGLOBIN A1C  --   --   --   --  5.70*  --    TSH  --   --   --   --   --  5.390*         Lab 03/01/23 2105   TOTAL PROTEIN 5.3*   ALBUMIN 2.5*   GLOBULIN 2.8   ALT (SGPT) 15   AST (SGOT) 25   BILIRUBIN 0.9   ALK PHOS 69         Lab 03/02/23  0012 03/01/23 2105   HSTROP T 48* 67*                 Brief Urine Lab Results  (Last result in the past 365 days)      Color   Clarity   Blood   Leuk Est   Nitrite   Protein   CREAT   Urine HCG        03/01/23 2327 Dark Yellow   Turbid   Trace   Moderate (2+)   Negative   100 mg/dL (2+)                 Microbiology Results Abnormal     Procedure Component Value - Date/Time    Urine Culture - Urine, Urine, Clean Catch [[id number]] Collected: 03/01/23 2327    Lab Status: Final result Specimen: Urine, Clean Catch Updated: 03/03/23 0655     Urine Culture >100,000 CFU/mL Mixed Aileen Isolated    Narrative:      Specimen contains mixed organisms of questionable pathogenicity suggestive of contamination. If symptoms persist, suggest  recollection.  Colonization of the urinary tract without infection is common. Treatment is discouraged unless the patient is symptomatic, pregnant, or undergoing an invasive urologic procedure.    Blood Culture - Blood, Arm, Left [[id number]]  (Normal) Collected: 03/01/23 2105    Lab Status: Preliminary result Specimen: Blood from Arm, Left Updated: 03/02/23 2131     Blood Culture No growth at 24 hours    Narrative:      Aerobic bottle only      Blood Culture - Blood, Hand, Left [[id number]]  (Normal) Collected: 03/01/23 2105    Lab Status: Preliminary result Specimen: Blood from Hand, Left Updated: 03/02/23 2131     Blood Culture No growth at 24 hours    Narrative:      Aerobic bottle only      Clostridioides difficile toxin Ag, Reflex - Stool, Per Rectum [[id number]]  (Normal) Collected: 03/02/23 1828    Lab Status: Final result Specimen: Stool from Per Rectum Updated: 03/02/23 2106     C.diff Toxin Ag Negative    Narrative:      DNA from a toxigenic strain of C.difficile was detected, although the free toxin itself was not detected. These findings are consistent with C.difficile colonization and may not reflect actual C.difficile infection. Clinical correlation needed.    Gastrointestinal Panel, PCR - Stool, Per Rectum [[id number]]  (Normal) Collected: 03/02/23 1828    Lab Status: Final result Specimen: Stool from Per Rectum Updated: 03/02/23 2030     Campylobacter Not Detected     Plesiomonas shigelloides Not Detected     Salmonella Not Detected     Vibrio Not Detected     Vibrio cholerae Not Detected     Yersinia enterocolitica Not Detected     Enteroaggregative E. coli (EAEC) Not Detected     Enteropathogenic E. coli (EPEC) Not Detected     Enterotoxigenic E. coli (ETEC) lt/st Not Detected     Shiga-like toxin-producing E. coli (STEC) stx1/stx2 Not Detected     Shigella/Enteroinvasive E. coli (EIEC) Not Detected     Cryptosporidium Not Detected     Cyclospora cayetanensis Not Detected     Entamoeba histolytica Not  Detected     Giardia lamblia Not Detected     Adenovirus F40/41 Not Detected     Astrovirus Not Detected     Norovirus GI/GII Not Detected     Rotavirus A Not Detected     Sapovirus (I, II, IV or V) Not Detected          CT Abdomen Pelvis With & Without Contrast    Result Date: 3/2/2023  CT ABDOMEN PELVIS W WO CONTRAST Date of Exam: 3/2/2023 2:46 PM EST Indication: UTI, recurrent/complicated (Female) Sepsis complicated sacral wound- eval for fistula. Comparison: None available. Technique: Axial CT images were obtained of the abdomen and pelvis before and after the uneventful intravenous administration of 95 mL Isovue-300. Sagittal and coronal reconstructions were performed.  Automated exposure control and iterative reconstruction methods were used. FINDINGS: Bibasilar infiltrates are noted. There is a small left pleural effusion. The findings suggest changes of bibasilar pneumonia. The liver, spleen, and pancreas are unremarkable. The gallbladder and bile ducts are unremarkable. The bilateral adrenal glands are symmetric and unremarkable. The bilateral kidneys are symmetric and unremarkable. There is no bowel dilatation or obstruction. A normal-appearing appendix is observed. There is no evidence for acute appendicitis. No significant free fluid is observed. No abnormal fluid collections are identified. No significant lymphadenopathy is seen  throughout the abdomen or pelvis. The bladder is decompressed with a Ayala catheter in place. The celiac and superior mesenteric arterial distributions are opacified without evidence for occlusion. There is abnormal attenuation involving the soft tissues of the left buttock. The findings suggest changes of a developing decubitus wound. There may be associated soft tissue cellulitis. No well-defined fluid collection is seen to suggest abscess. There  is additional focal edema within the subcutaneous soft tissues at the left lateral aspect of the abdomen and pelvis. The findings  may indicate an additional area of soft tissue cellulitis. Again no definitive well-defined fluid collection is seen to suggest abscess. No acute osseous abnormalities are observed.     Impression: 1.Evidence for bibasilar pneumonia. 2.Evidence for a decubitus wound involving the left buttock soft tissues. There is evidence for associated soft tissue cellulitis. No definitive abscess is seen. There is no evidence for fistula. 3.Evidence for abnormal edema within the subcutaneous soft tissues overlying the left lateral aspect of the abdomen and pelvis. The findings may indicate changes of soft tissue cellulitis. No focal abscess is seen. Electronically Signed: Henry Dee  3/2/2023 3:33 PM EST  Workstation ID: [id number]    XR Chest 1 View    Result Date: 3/2/2023  XR CHEST 1 VW Date of Exam: 3/2/2023 5:44 PM EST Indication: pna on CT a/p?. Comparison: Abdomen pelvis CT scan of same date. Portable chest radiograph of 10/25/2022 Findings: Heart shadow is of normal size. The vasculature is cephalized, with appearance of mild perihilar edema. There is only mild visible left basilar discoid atelectasis and effusion. No pneumothorax is seen. There is a marked dextroconvex thoracic scoliosis.     Impression: Impression: 1. Mild pulmonary vascular congestion. 2. Mild left basilar atelectasis and trace effusion, less apparent than seen on earlier CT scan. Electronically Signed: Silas Dubose  3/2/2023 6:06 PM EST  Workstation ID: [id number]      Results for orders placed during the hospital encounter of 10/18/22    Adult Transthoracic Echo Complete W/ Cont if Necessary Per Protocol    Interpretation Summary  •  Estimated left ventricular EF = 55% Left ventricular systolic function is normal.  •  Estimated right ventricular systolic pressure from tricuspid regurgitation is normal (<35 mmHg). Calculated right ventricular systolic pressure from tricuspid regurgitation is 28 mmHg.      Current medications:  Scheduled Meds:apixaban, 5  mg, Oral, Q12H  atorvastatin, 20 mg, Oral, Nightly  bisoprolol, 10 mg, Oral, Q24H  budesonide-formoterol, 2 puff, Inhalation, BID - RT  cefTRIAXone, 2 g, Intravenous, Q24H  predniSONE, 10 mg, Oral, Daily With Breakfast  sodium chloride, 10 mL, Intravenous, Q12H  zinc oxide, 1 application, Topical, Q12H      Continuous Infusions:Pharmacy Consult,       PRN Meds:.•  acetaminophen  •  albuterol  •  Calcium Replacement - Initiate Nurse / BPA Driven Protocol  •  HYDROcodone-acetaminophen  •  Magnesium Standard Dose Replacement - Initiate Nurse / BPA Driven Protocol  •  Pharmacy Consult  •  Phosphorus Replacement - Initiate Nurse / BPA Driven Protocol  •  Potassium Replacement - Initiate Nurse / BPA Driven Protocol  •  sodium chloride  •  sodium chloride    Assessment & Plan   Assessment & Plan     Active Hospital Problems    Diagnosis  POA   • **Diarrhea [R19.7]  Yes   • Buttock wound [S31.809A]  Yes   • Chronic indwelling Egrber catheter [Z97.8]  Not Applicable   • Current chronic use of systemic steroids [Z79.52]  Not Applicable   • 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        Brief Hospital Course to date:  Sol Lovelace is a 82 y.o. female w adrenal insufficiency 2/2 chronic steroid use, debility and bedbound status, urinary retention requiring gerber who presents w profuse diarrhea x days. Found to have sacral wounds on examination.    Severe sepsis 2/2 diarrheal illness w lactic acidosis - improved  - GI PCR negative, C diff toxin positive, Atg negative not active Cdiff  - spore precautions per ID, will check protocol  - since arriving here, BM much improved (1-3/day)    MASD, incontinence associated skin damage  -no s/s of  "fistula or deeper wound  -OK for rectal tube if profuse diarrhea restarts, but currently minimal     Chronic steroids use w adrenal insufficiency  -chronic prednisone 10 mg daily. H/o hyponatremia in past off steroids  -stress dose steroids if hypotension despite IVF    Pyuria in setting of chronic Ayala - on ceftriaxone now, d/w microbiology lab on 3/3 and requested speciation of \"mixed moira\" for pathologic moira     Afib -rate controled, cont eliquis and bisoprolol for rate control  Relative hypotension in setting of chronic HTN, improved - hold nifedipine  Asthma -cont advair  HLD -cont lipitor  Chronic debility, BMI 37 - complicates daily care  Hypokalemia, likely 2/2 diarrhea - replace per protocol  Hypomagnesemia - replace per protocol    Plan for home w hospice after this stay, hospice following now    Problems  Acute or chronic illnesses or injuries that may pose a threat to life or bodily function: severe sepsis    Data   Category 2  Tests independently interpreted: I performed an independent review of CXR and my findings are as follows: see above    Category 3  Case discussed with microbiology lab for moira speciation on 3/3    Expected Discharge Location and Transportation: home w home hospice supprot  Expected Discharge 3/6 (Discharge date is tentative pending patient's medical condition and is subject to change)  Expected Discharge Date and Time     Expected Discharge Date Expected Discharge Time    Mar 6, 2023            DVT prophylaxis:  Medical DVT prophylaxis orders are present.     AM-PAC 6 Clicks Score (PT): 6 (03/02/23 9605)    CODE STATUS:   Code Status and Medical Interventions:   Ordered at: 03/02/23 0919     Medical Intervention Limits:    NO intubation (DNI)     Code Status (Patient has no pulse and is not breathing):    No CPR (Do Not Attempt to Resuscitate)     Medical Interventions (Patient has pulse or is breathing):    Limited Support     Comments:    no ICU escalation     Release to " patient:    Routine Release       Selene Jefferson MD  03/03/23

## 2023-03-03 NOTE — PLAN OF CARE
Goal Outcome Evaluation:  Plan of Care Reviewed With: patient        Progress: no change  Outcome Evaluation: Patient rested this shift. C/o bilateral knee pain. PRN norco given. Skin care provider per order. Turned in bed through the night. Placed on 2L NC while sleeping r/t desaturation to low 80's while sleeping. Patient noted to be bradycardic with rate dropping to high 50's then increasing to over 60's. Patient in A. Fib throughout the night.

## 2023-03-04 LAB
ANION GAP SERPL CALCULATED.3IONS-SCNC: 11 MMOL/L (ref 5–15)
BACTERIA SPEC AEROBE CULT: ABNORMAL
BACTERIA SPEC AEROBE CULT: ABNORMAL
BACTERIA SPEC AEROBE CULT: NO GROWTH
BUN SERPL-MCNC: 7 MG/DL (ref 8–23)
BUN/CREAT SERPL: 28 (ref 7–25)
CALCIUM SPEC-SCNC: 7.4 MG/DL (ref 8.6–10.5)
CHLORIDE SERPL-SCNC: 101 MMOL/L (ref 98–107)
CO2 SERPL-SCNC: 24 MMOL/L (ref 22–29)
CREAT SERPL-MCNC: 0.25 MG/DL (ref 0.57–1)
DEPRECATED RDW RBC AUTO: 54 FL (ref 37–54)
EGFRCR SERPLBLD CKD-EPI 2021: 112.2 ML/MIN/1.73
ERYTHROCYTE [DISTWIDTH] IN BLOOD BY AUTOMATED COUNT: 16.8 % (ref 12.3–15.4)
GLUCOSE BLDC GLUCOMTR-MCNC: 99 MG/DL (ref 70–130)
GLUCOSE SERPL-MCNC: 65 MG/DL (ref 65–99)
HCT VFR BLD AUTO: 33.4 % (ref 34–46.6)
HGB BLD-MCNC: 10.6 G/DL (ref 12–15.9)
MCH RBC QN AUTO: 28.4 PG (ref 26.6–33)
MCHC RBC AUTO-ENTMCNC: 31.7 G/DL (ref 31.5–35.7)
MCV RBC AUTO: 89.5 FL (ref 79–97)
PLATELET # BLD AUTO: 200 10*3/MM3 (ref 140–450)
PMV BLD AUTO: 10.3 FL (ref 6–12)
POTASSIUM SERPL-SCNC: 3.4 MMOL/L (ref 3.5–5.2)
RBC # BLD AUTO: 3.73 10*6/MM3 (ref 3.77–5.28)
SODIUM SERPL-SCNC: 136 MMOL/L (ref 136–145)
WBC NRBC COR # BLD: 7.38 10*3/MM3 (ref 3.4–10.8)

## 2023-03-04 PROCEDURE — 99232 SBSQ HOSP IP/OBS MODERATE 35: CPT | Performed by: INTERNAL MEDICINE

## 2023-03-04 PROCEDURE — 63710000001 PREDNISONE PER 5 MG: Performed by: INTERNAL MEDICINE

## 2023-03-04 PROCEDURE — 94799 UNLISTED PULMONARY SVC/PX: CPT

## 2023-03-04 PROCEDURE — 25010000002 CEFTRIAXONE PER 250 MG: Performed by: INTERNAL MEDICINE

## 2023-03-04 PROCEDURE — 82962 GLUCOSE BLOOD TEST: CPT

## 2023-03-04 PROCEDURE — 85027 COMPLETE CBC AUTOMATED: CPT | Performed by: INTERNAL MEDICINE

## 2023-03-04 PROCEDURE — 80048 BASIC METABOLIC PNL TOTAL CA: CPT

## 2023-03-04 PROCEDURE — 94761 N-INVAS EAR/PLS OXIMETRY MLT: CPT

## 2023-03-04 RX ORDER — LOPERAMIDE HYDROCHLORIDE 2 MG/1
2 CAPSULE ORAL 4 TIMES DAILY PRN
Status: DISCONTINUED | OUTPATIENT
Start: 2023-03-04 | End: 2023-03-09 | Stop reason: HOSPADM

## 2023-03-04 RX ORDER — POTASSIUM CHLORIDE 750 MG/1
40 CAPSULE, EXTENDED RELEASE ORAL EVERY 4 HOURS
Status: COMPLETED | OUTPATIENT
Start: 2023-03-04 | End: 2023-03-04

## 2023-03-04 RX ADMIN — ACETAMINOPHEN 325MG 650 MG: 325 TABLET ORAL at 06:10

## 2023-03-04 RX ADMIN — LOPERAMIDE HYDROCHLORIDE 2 MG: 2 CAPSULE ORAL at 15:30

## 2023-03-04 RX ADMIN — APIXABAN 5 MG: 5 TABLET, FILM COATED ORAL at 20:28

## 2023-03-04 RX ADMIN — Medication 10 ML: at 20:29

## 2023-03-04 RX ADMIN — Medication 1 APPLICATION: at 09:06

## 2023-03-04 RX ADMIN — ACETAMINOPHEN 325MG 650 MG: 325 TABLET ORAL at 20:29

## 2023-03-04 RX ADMIN — APIXABAN 5 MG: 5 TABLET, FILM COATED ORAL at 09:06

## 2023-03-04 RX ADMIN — ACETAMINOPHEN 325MG 650 MG: 325 TABLET ORAL at 12:07

## 2023-03-04 RX ADMIN — POTASSIUM CHLORIDE 40 MEQ: 750 CAPSULE, EXTENDED RELEASE ORAL at 12:07

## 2023-03-04 RX ADMIN — CEFTRIAXONE 2 G: 2 INJECTION, POWDER, FOR SOLUTION INTRAMUSCULAR; INTRAVENOUS at 20:27

## 2023-03-04 RX ADMIN — PREDNISONE 10 MG: 10 TABLET ORAL at 09:06

## 2023-03-04 RX ADMIN — BUDESONIDE AND FORMOTEROL FUMARATE DIHYDRATE 2 PUFF: 160; 4.5 AEROSOL RESPIRATORY (INHALATION) at 08:43

## 2023-03-04 RX ADMIN — POTASSIUM CHLORIDE 40 MEQ: 750 CAPSULE, EXTENDED RELEASE ORAL at 17:15

## 2023-03-04 RX ADMIN — ATORVASTATIN CALCIUM 20 MG: 20 TABLET, FILM COATED ORAL at 20:28

## 2023-03-04 RX ADMIN — Medication 10 ML: at 09:06

## 2023-03-04 RX ADMIN — BUDESONIDE AND FORMOTEROL FUMARATE DIHYDRATE 2 PUFF: 160; 4.5 AEROSOL RESPIRATORY (INHALATION) at 19:36

## 2023-03-04 NOTE — PROGRESS NOTES
[facility] Medicine Services  PROGRESS NOTE    Patient Name: Sol Lovelace  : 1940  MRN: [id number]    Date of Admission: 3/1/2023  Primary Care Physician: Young Huynh MD    Subjective   Subjective     CC:  diarrhea    HPI:  Diarrhea much improved per patient. Only 1 stool in last 24 hours charted by nursing.   Patient eating very very little, glucose low (60's)    ROS:  Gen- No fevers, chills  CV- No chest pain, palpitations  Resp- No cough, dyspnea    Objective   Objective     Vital Signs:   Temp:  [96.6 °F (35.9 °C)-97.5 °F (36.4 °C)] 97.5 °F (36.4 °C)  Heart Rate:  [67-99] 81  Resp:  [16-18] 16  BP: ()/(59-84) 130/84  Flow (L/min):  [2] 2     Physical Exam:  Constitutional: Chronically ill appearing female lying in bed, very little of breakfast tray eaten  HENT: NCAT, mucous membranes moist  Respiratory: Clear to auscultation bilaterally, respiratory effort normal   Cardiovascular: RRR, no murmurs, rubs, or gallops  Gastrointestinal: Soft, nontender, nondistended  Musculoskeletal: Muscle tone within normal limits, no joint effusions appreciated  Psychiatric: Appropriate affect, cooperative  Neurologic: Alert and oriented, facial movements symmetric and spontaneous movement of all 4 extremities grossly equal bilaterally, speech clear  Skin: Easy bruising superficially, doughy skin, wounds not examined today  : gerber in place w light yellow urine    Results Reviewed:  LAB RESULTS:      Lab 23  0407 23  0424 23  1937 23  1229 23  0703 23  0250 23  0012 23  2106 23   WBC 7.38 7.32  --   --   --  11.58*  --   --  12.57*   HEMOGLOBIN 10.6* 9.8*  --   --   --  12.5  --   --  12.9   HEMATOCRIT 33.4* 31.4*  --   --   --  39.4  --   --  41.5   PLATELETS 200 163  --   --   --  153  --   --  164   NEUTROS ABS  --   --   --   --   --  9.31*  --   --  10.47*   IMMATURE GRANS (ABS)  --   --   --   --   --  0.08*  --   --   0.08*   LYMPHS ABS  --   --   --   --   --  1.52  --   --  1.44   MONOS ABS  --   --   --   --   --  0.52  --   --  0.50   EOS ABS  --   --   --   --   --  0.11  --   --  0.03   MCV 89.5 89.0  --   --   --  92.5  --   --  91.4   PROCALCITONIN  --   --   --   --   --   --   --   --  0.33*   LACTATE  --   --  1.5 2.2* 3.3* 3.1* 2.9*   < >  --     < > = values in this interval not displayed.         Lab 03/04/23  0407 03/03/23  0424 03/02/23  1229 03/02/23  0250 03/02/23  0012 03/01/23 2106 03/01/23 2105   SODIUM 136 133*  --  132*  --   --  133*   POTASSIUM 3.4* 3.8 4.1 3.1*  --   --  2.9*   CHLORIDE 101 102  --  97*  --   --  96*   CO2 24.0 24.0  --  20.0*  --   --  22.0   ANION GAP 11.0 7.0  --  15.0  --   --  15.0   BUN 7* 10  --  18  --   --  18   CREATININE 0.25* 0.26*  --  0.32*  --   --  0.53*   EGFR 112.2 111.2  --  105.7  --   --  93.6   GLUCOSE 65 72  --  93  --   --  91   CALCIUM 7.4* 7.6*  --  7.5*  --   --  8.2*   MAGNESIUM  --   --   --  1.4* 1.6  --  1.9   HEMOGLOBIN A1C  --   --   --   --   --  5.70*  --    TSH  --   --   --   --   --   --  5.390*         Lab 03/01/23 2105   TOTAL PROTEIN 5.3*   ALBUMIN 2.5*   GLOBULIN 2.8   ALT (SGPT) 15   AST (SGOT) 25   BILIRUBIN 0.9   ALK PHOS 69         Lab 03/02/23  0012 03/01/23 2105   HSTROP T 48* 67*                 Brief Urine Lab Results  (Last result in the past 365 days)      Color   Clarity   Blood   Leuk Est   Nitrite   Protein   CREAT   Urine HCG        03/03/23 1559 Orange   Cloudy   Large (3+)   Small (1+)   Negative   30 mg/dL (1+)                 Microbiology Results Abnormal     Procedure Component Value - Date/Time    Urine Culture - Urine, Indwelling Urethral Catheter [[id number]]  (Normal) Collected: 03/03/23 1559    Lab Status: Preliminary result Specimen: Urine from Indwelling Urethral Catheter Updated: 03/04/23 1028     Urine Culture No growth    Blood Culture - Blood, Hand, Left [[id number]]  (Normal) Collected: 03/01/23 2105    Lab  Status: Preliminary result Specimen: Blood from Hand, Left Updated: 03/03/23 2131     Blood Culture No growth at 2 days    Narrative:      Aerobic bottle only      Blood Culture - Blood, Arm, Left [[id number]]  (Normal) Collected: 03/01/23 2105    Lab Status: Preliminary result Specimen: Blood from Arm, Left Updated: 03/03/23 2131     Blood Culture No growth at 2 days    Narrative:      Aerobic bottle only      Clostridioides difficile toxin Ag, Reflex - Stool, Per Rectum [[id number]]  (Normal) Collected: 03/02/23 1828    Lab Status: Final result Specimen: Stool from Per Rectum Updated: 03/02/23 2106     C.diff Toxin Ag Negative    Narrative:      DNA from a toxigenic strain of C.difficile was detected, although the free toxin itself was not detected. These findings are consistent with C.difficile colonization and may not reflect actual C.difficile infection. Clinical correlation needed.    Gastrointestinal Panel, PCR - Stool, Per Rectum [[id number]]  (Normal) Collected: 03/02/23 1828    Lab Status: Final result Specimen: Stool from Per Rectum Updated: 03/02/23 2030     Campylobacter Not Detected     Plesiomonas shigelloides Not Detected     Salmonella Not Detected     Vibrio Not Detected     Vibrio cholerae Not Detected     Yersinia enterocolitica Not Detected     Enteroaggregative E. coli (EAEC) Not Detected     Enteropathogenic E. coli (EPEC) Not Detected     Enterotoxigenic E. coli (ETEC) lt/st Not Detected     Shiga-like toxin-producing E. coli (STEC) stx1/stx2 Not Detected     Shigella/Enteroinvasive E. coli (EIEC) Not Detected     Cryptosporidium Not Detected     Cyclospora cayetanensis Not Detected     Entamoeba histolytica Not Detected     Giardia lamblia Not Detected     Adenovirus F40/41 Not Detected     Astrovirus Not Detected     Norovirus GI/GII Not Detected     Rotavirus A Not Detected     Sapovirus (I, II, IV or V) Not Detected          CT Abdomen Pelvis With & Without Contrast    Result Date:  3/2/2023  CT ABDOMEN PELVIS W WO CONTRAST Date of Exam: 3/2/2023 2:46 PM EST Indication: UTI, recurrent/complicated (Female) Sepsis complicated sacral wound- eval for fistula. Comparison: None available. Technique: Axial CT images were obtained of the abdomen and pelvis before and after the uneventful intravenous administration of 95 mL Isovue-300. Sagittal and coronal reconstructions were performed.  Automated exposure control and iterative reconstruction methods were used. FINDINGS: Bibasilar infiltrates are noted. There is a small left pleural effusion. The findings suggest changes of bibasilar pneumonia. The liver, spleen, and pancreas are unremarkable. The gallbladder and bile ducts are unremarkable. The bilateral adrenal glands are symmetric and unremarkable. The bilateral kidneys are symmetric and unremarkable. There is no bowel dilatation or obstruction. A normal-appearing appendix is observed. There is no evidence for acute appendicitis. No significant free fluid is observed. No abnormal fluid collections are identified. No significant lymphadenopathy is seen  throughout the abdomen or pelvis. The bladder is decompressed with a Ayala catheter in place. The celiac and superior mesenteric arterial distributions are opacified without evidence for occlusion. There is abnormal attenuation involving the soft tissues of the left buttock. The findings suggest changes of a developing decubitus wound. There may be associated soft tissue cellulitis. No well-defined fluid collection is seen to suggest abscess. There  is additional focal edema within the subcutaneous soft tissues at the left lateral aspect of the abdomen and pelvis. The findings may indicate an additional area of soft tissue cellulitis. Again no definitive well-defined fluid collection is seen to suggest abscess. No acute osseous abnormalities are observed.     Impression: 1.Evidence for bibasilar pneumonia. 2.Evidence for a decubitus wound involving  the left buttock soft tissues. There is evidence for associated soft tissue cellulitis. No definitive abscess is seen. There is no evidence for fistula. 3.Evidence for abnormal edema within the subcutaneous soft tissues overlying the left lateral aspect of the abdomen and pelvis. The findings may indicate changes of soft tissue cellulitis. No focal abscess is seen. Electronically Signed: Henry Dee  3/2/2023 3:33 PM EST  Workstation ID: [id number]    XR Chest 1 View    Result Date: 3/2/2023  XR CHEST 1 VW Date of Exam: 3/2/2023 5:44 PM EST Indication: pna on CT a/p?. Comparison: Abdomen pelvis CT scan of same date. Portable chest radiograph of 10/25/2022 Findings: Heart shadow is of normal size. The vasculature is cephalized, with appearance of mild perihilar edema. There is only mild visible left basilar discoid atelectasis and effusion. No pneumothorax is seen. There is a marked dextroconvex thoracic scoliosis.     Impression: Impression: 1. Mild pulmonary vascular congestion. 2. Mild left basilar atelectasis and trace effusion, less apparent than seen on earlier CT scan. Electronically Signed: Silas Dubose  3/2/2023 6:06 PM EST  Workstation ID: [id number]      Results for orders placed during the hospital encounter of 10/18/22    Adult Transthoracic Echo Complete W/ Cont if Necessary Per Protocol    Interpretation Summary  •  Estimated left ventricular EF = 55% Left ventricular systolic function is normal.  •  Estimated right ventricular systolic pressure from tricuspid regurgitation is normal (<35 mmHg). Calculated right ventricular systolic pressure from tricuspid regurgitation is 28 mmHg.      Current medications:  Scheduled Meds:apixaban, 5 mg, Oral, Q12H  atorvastatin, 20 mg, Oral, Nightly  bisoprolol, 10 mg, Oral, Q24H  budesonide-formoterol, 2 puff, Inhalation, BID - RT  cefTRIAXone, 2 g, Intravenous, Q24H  potassium chloride, 40 mEq, Oral, Q4H  predniSONE, 10 mg, Oral, Daily With Breakfast  sodium chloride,  10 mL, Intravenous, Q12H  zinc oxide, 1 application, Topical, Q12H      Continuous Infusions:Pharmacy Consult,       PRN Meds:.•  acetaminophen  •  albuterol  •  Calcium Replacement - Initiate Nurse / BPA Driven Protocol  •  HYDROcodone-acetaminophen  •  loperamide  •  Magnesium Standard Dose Replacement - Initiate Nurse / BPA Driven Protocol  •  Pharmacy Consult  •  Phosphorus Replacement - Initiate Nurse / BPA Driven Protocol  •  Potassium Replacement - Initiate Nurse / BPA Driven Protocol  •  sodium chloride  •  sodium chloride    Assessment & Plan   Assessment & Plan     Active Hospital Problems    Diagnosis  POA   • **Diarrhea [R19.7]  Yes   • Buttock wound [S31.809A]  Yes   • Chronic indwelling Gerber catheter [Z97.8]  Not Applicable   • Current chronic use of systemic steroids [Z79.52]  Not Applicable   • Adrenal insufficiency due to corticosteroid withdrawal ([facility]) [E27.3, T38.0X5A]  Yes   • Severe obesity (BMI 35.0-35.9 with comorbidity) ([facility]) [E66.01, Z68.35]  Not Applicable   • Paroxysmal atrial fibrillation ([facility]) [I48.0]  Yes   • Essential hypertension [I10]  Yes      Resolved Hospital Problems   No resolved problems to display.        Brief Hospital Course to date:  Sol Lovelace is a 82 y.o. female w adrenal insufficiency 2/2 chronic steroid use, debility and bedbound status, urinary retention requiring gerber who presents w profuse diarrhea x days. Found to have sacral wounds on examination.    Severe sepsis 2/2 diarrheal illness w lactic acidosis - improved  - GI PCR negative, C diff toxin positive, Atg negative not active Cdiff  - spore precautions per infection control: pending recs  - since arriving here, BM much improved (1-3/day)  - OK for imodium PRN    MASD, incontinence associated skin damage  -no s/s of fistula or deeper wound  -OK for rectal tube if profuse diarrhea restarts, but currently minimal     Chronic steroids use w adrenal insufficiency  -chronic prednisone 10 mg daily. H/o  hyponatremia in past off steroids  -glucose low w poor intake: start q6 checks, may need increased pred dose    Pyuria in setting of chronic Ayala - on ceftriaxone now, E coli again which favors colonization. Would like to dc ceftriaxone and not further treat colonization - will d/w ID     Afib -rate controled, cont eliquis and bisoprolol for rate control  Relative hypotension in setting of chronic HTN, improved - hold nifedipine  Asthma -cont advair  HLD -cont lipitor  Chronic debility, BMI 37 - complicates daily care  Hypokalemia, likely 2/2 diarrhea - replace per protocol  Hypomagnesemia - replace per protocol    Plan for home w hospice after this stay, hospice following now    Expected Discharge Location and Transportation: home w home hospice supprot  Expected Discharge 3/6 (Discharge date is tentative pending patient's medical condition and is subject to change)  Expected Discharge Date and Time     Expected Discharge Date Expected Discharge Time    Mar 6, 2023            DVT prophylaxis:  Medical DVT prophylaxis orders are present.     AM-PAC 6 Clicks Score (PT): 6 (03/02/23 0914)    CODE STATUS:   Code Status and Medical Interventions:   Ordered at: 03/02/23 0919     Medical Intervention Limits:    NO intubation (DNI)     Code Status (Patient has no pulse and is not breathing):    No CPR (Do Not Attempt to Resuscitate)     Medical Interventions (Patient has pulse or is breathing):    Limited Support     Comments:    no ICU escalation     Release to patient:    Routine Release       Selene Jefferson MD  03/04/23

## 2023-03-04 NOTE — PLAN OF CARE
Goal Outcome Evaluation:  Plan of Care Reviewed With: patient        Progress: no change  Outcome Evaluation: Patient rested this shift. Vitals stable. No c/o pain this shift. Patient remains in A. Fib

## 2023-03-05 LAB
ANION GAP SERPL CALCULATED.3IONS-SCNC: 10 MMOL/L (ref 5–15)
BUN SERPL-MCNC: 10 MG/DL (ref 8–23)
BUN/CREAT SERPL: 40 (ref 7–25)
CALCIUM SPEC-SCNC: 7.7 MG/DL (ref 8.6–10.5)
CHLORIDE SERPL-SCNC: 105 MMOL/L (ref 98–107)
CO2 SERPL-SCNC: 23 MMOL/L (ref 22–29)
CREAT SERPL-MCNC: 0.25 MG/DL (ref 0.57–1)
EGFRCR SERPLBLD CKD-EPI 2021: 112.2 ML/MIN/1.73
GLUCOSE BLDC GLUCOMTR-MCNC: 101 MG/DL (ref 70–130)
GLUCOSE BLDC GLUCOMTR-MCNC: 74 MG/DL (ref 70–130)
GLUCOSE BLDC GLUCOMTR-MCNC: 88 MG/DL (ref 70–130)
GLUCOSE SERPL-MCNC: 67 MG/DL (ref 65–99)
POTASSIUM SERPL-SCNC: 4.1 MMOL/L (ref 3.5–5.2)
SODIUM SERPL-SCNC: 138 MMOL/L (ref 136–145)

## 2023-03-05 PROCEDURE — 84439 ASSAY OF FREE THYROXINE: CPT | Performed by: INTERNAL MEDICINE

## 2023-03-05 PROCEDURE — 94664 DEMO&/EVAL PT USE INHALER: CPT

## 2023-03-05 PROCEDURE — 99232 SBSQ HOSP IP/OBS MODERATE 35: CPT | Performed by: INTERNAL MEDICINE

## 2023-03-05 PROCEDURE — 25010000002 CEFTRIAXONE PER 250 MG: Performed by: INTERNAL MEDICINE

## 2023-03-05 PROCEDURE — 63710000001 PREDNISONE PER 5 MG: Performed by: INTERNAL MEDICINE

## 2023-03-05 PROCEDURE — 82962 GLUCOSE BLOOD TEST: CPT

## 2023-03-05 PROCEDURE — 94799 UNLISTED PULMONARY SVC/PX: CPT

## 2023-03-05 PROCEDURE — 80048 BASIC METABOLIC PNL TOTAL CA: CPT

## 2023-03-05 RX ADMIN — LOPERAMIDE HYDROCHLORIDE 2 MG: 2 CAPSULE ORAL at 22:15

## 2023-03-05 RX ADMIN — Medication 1 APPLICATION: at 21:04

## 2023-03-05 RX ADMIN — Medication 10 ML: at 21:03

## 2023-03-05 RX ADMIN — BUDESONIDE AND FORMOTEROL FUMARATE DIHYDRATE 2 PUFF: 160; 4.5 AEROSOL RESPIRATORY (INHALATION) at 07:52

## 2023-03-05 RX ADMIN — PREDNISONE 10 MG: 10 TABLET ORAL at 08:34

## 2023-03-05 RX ADMIN — BISOPROLOL FUMARATE 10 MG: 5 TABLET, FILM COATED ORAL at 08:34

## 2023-03-05 RX ADMIN — ACETAMINOPHEN 325MG 650 MG: 325 TABLET ORAL at 22:15

## 2023-03-05 RX ADMIN — ACETAMINOPHEN 325MG 650 MG: 325 TABLET ORAL at 08:34

## 2023-03-05 RX ADMIN — APIXABAN 5 MG: 5 TABLET, FILM COATED ORAL at 08:34

## 2023-03-05 RX ADMIN — BUDESONIDE AND FORMOTEROL FUMARATE DIHYDRATE 2 PUFF: 160; 4.5 AEROSOL RESPIRATORY (INHALATION) at 19:37

## 2023-03-05 RX ADMIN — LOPERAMIDE HYDROCHLORIDE 2 MG: 2 CAPSULE ORAL at 01:02

## 2023-03-05 RX ADMIN — ACETAMINOPHEN 325MG 650 MG: 325 TABLET ORAL at 14:29

## 2023-03-05 RX ADMIN — Medication 1 APPLICATION: at 08:37

## 2023-03-05 RX ADMIN — CEFTRIAXONE 2 G: 2 INJECTION, POWDER, FOR SOLUTION INTRAMUSCULAR; INTRAVENOUS at 21:03

## 2023-03-05 RX ADMIN — ATORVASTATIN CALCIUM 20 MG: 20 TABLET, FILM COATED ORAL at 21:03

## 2023-03-05 RX ADMIN — LOPERAMIDE HYDROCHLORIDE 2 MG: 2 CAPSULE ORAL at 08:34

## 2023-03-05 RX ADMIN — HYDROCODONE BITARTRATE AND ACETAMINOPHEN 1 TABLET: 5; 325 TABLET ORAL at 15:44

## 2023-03-05 RX ADMIN — HYDROCODONE BITARTRATE AND ACETAMINOPHEN 1 TABLET: 5; 325 TABLET ORAL at 01:04

## 2023-03-05 RX ADMIN — APIXABAN 5 MG: 5 TABLET, FILM COATED ORAL at 21:03

## 2023-03-05 NOTE — PROGRESS NOTES
[facility] Medicine Services  PROGRESS NOTE    Patient Name: Sol Lovelace  : 1940  MRN: [id number]    Date of Admission: 3/1/2023  Primary Care Physician: Young Huynh MD    Subjective   Subjective     CC:  diarrhea    HPI:  Diarrhea much improved per patient. Eating little  Weak.    ROS:  Gen- No fevers, chills  CV- No chest pain, palpitations  Resp- No cough, dyspnea    Objective   Objective     Vital Signs:   Temp:  [96.5 °F (35.8 °C)-97.5 °F (36.4 °C)] 97 °F (36.1 °C)  Heart Rate:  [] 81  Resp:  [16-18] 17  BP: ()/(63-84) 121/79  Flow (L/min):  [2] 2     Physical Exam:  Constitutional: Chronically ill appearing female lying in bed  HENT: NCAT, mucous membranes moist  Respiratory: Clear to auscultation bilaterally, respiratory effort normal   Cardiovascular: RRR, no murmurs, rubs, or gallops  Gastrointestinal: Soft, nontender, nondistended  Musculoskeletal: Muscle tone decreased throughout, no joint effusions appreciated  Psychiatric: Appropriate affect, cooperative  Neurologic: Alert and oriented, facial movements symmetric and spontaneous movement of all 4 extremities grossly equal bilaterally, speech clear  Skin: Easy bruising superficially, doughy skin, wounds not examined today  : gerber in place w light yellow urine    Results Reviewed:  LAB RESULTS:      Lab 23  0407 23  0424 23  1937 23  1229 23  0703 23  0250 23  0012 23  2106 23  210   WBC 7.38 7.32  --   --   --  11.58*  --   --  12.57*   HEMOGLOBIN 10.6* 9.8*  --   --   --  12.5  --   --  12.9   HEMATOCRIT 33.4* 31.4*  --   --   --  39.4  --   --  41.5   PLATELETS 200 163  --   --   --  153  --   --  164   NEUTROS ABS  --   --   --   --   --  9.31*  --   --  10.47*   IMMATURE GRANS (ABS)  --   --   --   --   --  0.08*  --   --  0.08*   LYMPHS ABS  --   --   --   --   --  1.52  --   --  1.44   MONOS ABS  --   --   --   --   --  0.52  --   --   0.50   EOS ABS  --   --   --   --   --  0.11  --   --  0.03   MCV 89.5 89.0  --   --   --  92.5  --   --  91.4   PROCALCITONIN  --   --   --   --   --   --   --   --  0.33*   LACTATE  --   --  1.5 2.2* 3.3* 3.1* 2.9*   < >  --     < > = values in this interval not displayed.         Lab 03/05/23  0603 03/04/23  0407 03/03/23  0424 03/02/23  1229 03/02/23  0250 03/02/23  0012 03/01/23 2106 03/01/23 2105   SODIUM 138 136 133*  --  132*  --   --  133*   POTASSIUM 4.1 3.4* 3.8 4.1 3.1*  --   --  2.9*   CHLORIDE 105 101 102  --  97*  --   --  96*   CO2 23.0 24.0 24.0  --  20.0*  --   --  22.0   ANION GAP 10.0 11.0 7.0  --  15.0  --   --  15.0   BUN 10 7* 10  --  18  --   --  18   CREATININE 0.25* 0.25* 0.26*  --  0.32*  --   --  0.53*   EGFR 112.2 112.2 111.2  --  105.7  --   --  93.6   GLUCOSE 67 65 72  --  93  --   --  91   CALCIUM 7.7* 7.4* 7.6*  --  7.5*  --   --  8.2*   MAGNESIUM  --   --   --   --  1.4* 1.6  --  1.9   HEMOGLOBIN A1C  --   --   --   --   --   --  5.70*  --    TSH  --   --   --   --   --   --   --  5.390*         Lab 03/01/23 2105   TOTAL PROTEIN 5.3*   ALBUMIN 2.5*   GLOBULIN 2.8   ALT (SGPT) 15   AST (SGOT) 25   BILIRUBIN 0.9   ALK PHOS 69         Lab 03/02/23  0012 03/01/23 2105   HSTROP T 48* 67*                 Brief Urine Lab Results  (Last result in the past 365 days)      Color   Clarity   Blood   Leuk Est   Nitrite   Protein   CREAT   Urine HCG        03/03/23 1559 Orange   Cloudy   Large (3+)   Small (1+)   Negative   30 mg/dL (1+)                 Microbiology Results Abnormal     Procedure Component Value - Date/Time    Blood Culture - Blood, Hand, Left [[id number]]  (Normal) Collected: 03/01/23 2105    Lab Status: Preliminary result Specimen: Blood from Hand, Left Updated: 03/04/23 2131     Blood Culture No growth at 3 days    Narrative:      Aerobic bottle only      Blood Culture - Blood, Arm, Left [[id number]]  (Normal) Collected: 03/01/23 2105    Lab Status: Preliminary result  Specimen: Blood from Arm, Left Updated: 03/04/23 2131     Blood Culture No growth at 3 days    Narrative:      Aerobic bottle only      Urine Culture - Urine, Indwelling Urethral Catheter [[id number]]  (Normal) Collected: 03/03/23 1559    Lab Status: Final result Specimen: Urine from Indwelling Urethral Catheter Updated: 03/04/23 2033     Urine Culture No growth    Clostridioides difficile toxin Ag, Reflex - Stool, Per Rectum [[id number]]  (Normal) Collected: 03/02/23 1828    Lab Status: Final result Specimen: Stool from Per Rectum Updated: 03/02/23 2106     C.diff Toxin Ag Negative    Narrative:      DNA from a toxigenic strain of C.difficile was detected, although the free toxin itself was not detected. These findings are consistent with C.difficile colonization and may not reflect actual C.difficile infection. Clinical correlation needed.    Gastrointestinal Panel, PCR - Stool, Per Rectum [[id number]]  (Normal) Collected: 03/02/23 1828    Lab Status: Final result Specimen: Stool from Per Rectum Updated: 03/02/23 2030     Campylobacter Not Detected     Plesiomonas shigelloides Not Detected     Salmonella Not Detected     Vibrio Not Detected     Vibrio cholerae Not Detected     Yersinia enterocolitica Not Detected     Enteroaggregative E. coli (EAEC) Not Detected     Enteropathogenic E. coli (EPEC) Not Detected     Enterotoxigenic E. coli (ETEC) lt/st Not Detected     Shiga-like toxin-producing E. coli (STEC) stx1/stx2 Not Detected     Shigella/Enteroinvasive E. coli (EIEC) Not Detected     Cryptosporidium Not Detected     Cyclospora cayetanensis Not Detected     Entamoeba histolytica Not Detected     Giardia lamblia Not Detected     Adenovirus F40/41 Not Detected     Astrovirus Not Detected     Norovirus GI/GII Not Detected     Rotavirus A Not Detected     Sapovirus (I, II, IV or V) Not Detected          No radiology results from the last 24 hrs    Results for orders placed during the hospital encounter of  10/18/22    Adult Transthoracic Echo Complete W/ Cont if Necessary Per Protocol    Interpretation Summary  •  Estimated left ventricular EF = 55% Left ventricular systolic function is normal.  •  Estimated right ventricular systolic pressure from tricuspid regurgitation is normal (<35 mmHg). Calculated right ventricular systolic pressure from tricuspid regurgitation is 28 mmHg.      Current medications:  Scheduled Meds:apixaban, 5 mg, Oral, Q12H  atorvastatin, 20 mg, Oral, Nightly  bisoprolol, 10 mg, Oral, Q24H  budesonide-formoterol, 2 puff, Inhalation, BID - RT  cefTRIAXone, 2 g, Intravenous, Q24H  predniSONE, 10 mg, Oral, Daily With Breakfast  sodium chloride, 10 mL, Intravenous, Q12H  zinc oxide, 1 application, Topical, Q12H      Continuous Infusions:Pharmacy Consult,       PRN Meds:.•  acetaminophen  •  albuterol  •  Calcium Replacement - Initiate Nurse / BPA Driven Protocol  •  HYDROcodone-acetaminophen  •  loperamide  •  Magnesium Standard Dose Replacement - Initiate Nurse / BPA Driven Protocol  •  Pharmacy Consult  •  Phosphorus Replacement - Initiate Nurse / BPA Driven Protocol  •  Potassium Replacement - Initiate Nurse / BPA Driven Protocol  •  sodium chloride  •  sodium chloride    Assessment & Plan   Assessment & Plan     Active Hospital Problems    Diagnosis  POA   • **Diarrhea [R19.7]  Yes   • Buttock wound [S31.809A]  Yes   • Chronic indwelling Ayala catheter [Z97.8]  Not Applicable   • Current chronic use of systemic steroids [Z79.52]  Not Applicable   • Adrenal insufficiency due to corticosteroid withdrawal ([facility]) [E27.3, T38.0X5A]  Yes   • Severe obesity (BMI 35.0-35.9 with comorbidity) ([facility]) [E66.01, Z68.35]  Not Applicable   • Paroxysmal atrial fibrillation ([facility]) [I48.0]  Yes   • Essential hypertension [I10]  Yes      Resolved Hospital Problems   No resolved problems to display.        Brief Hospital Course to date:  Sol Lovelace is a 82 y.o. female w adrenal insufficiency 2/2 chronic steroid  use, debility and bedbound status, urinary retention requiring gerber who presents w profuse diarrhea x days. Found to have sacral wounds on examination.    Severe sepsis 2/2 diarrheal illness w lactic acidosis - improved  - GI PCR negative, C diff toxin positive, Atg negative not active Cdiff  - spore precautions per infection control: pending recs to clarify that  - since arriving here, BM improved but hard to assess (patient not reliable)  - OK for imodium PRN    MASD, incontinence associated skin damage  -no s/s of fistula or deeper wound  -OK for rectal tube if profuse diarrhea restarts     Chronic steroids use w adrenal insufficiency  -chronic prednisone 10 mg daily. H/o hyponatremia in past off steroids  -maintaining glucose, d/c checks    Pyuria in setting of chronic Gerber - finished ceftriaxone D5/5 today, really think this represents E coli colonization (third E coli s/p many antibiotic course without paitent have fever + confusion)    Afib -rate controled, cont eliquis and bisoprolol for rate control  Relative hypotension in setting of chronic HTN, improved - hold nifedipine  Asthma -cont advair  HLD -cont lipitor  Chronic debility, BMI 37 - complicates daily care  Hypokalemia, likely 2/2 diarrhea - replace per protocol  Hypomagnesemia - replace per protocol    Family hopeful for rehab stay, if no change to condition, then transition to hospice  Will d/w CM 3/6 - medically ready    Expected Discharge Date and Time     Expected Discharge Date Expected Discharge Time    Mar 6, 2023            DVT prophylaxis:  Medical DVT prophylaxis orders are present.     AM-PAC 6 Clicks Score (PT): 6 (03/02/23 0914)    CODE STATUS:   Code Status and Medical Interventions:   Ordered at: 03/02/23 0919     Medical Intervention Limits:    NO intubation (DNI)     Code Status (Patient has no pulse and is not breathing):    No CPR (Do Not Attempt to Resuscitate)     Medical Interventions (Patient has pulse or is breathing):     Limited Support     Comments:    no ICU escalation     Release to patient:    Routine Release       Selene Jefferson MD  03/05/23

## 2023-03-05 NOTE — PLAN OF CARE
Goal Outcome Evaluation:  Plan of Care Reviewed With: patient        Progress: no change  Outcome Evaluation: Patient rested this shfit. Turned in bed every two hours. Vitals stable. c/o dizziness with turning for incontinence care. Patient noted to have 4 bm this shift. Last BM appeared more solid this morning. Wound noted to be bleeding this shift. and protective cream crusted per order. Noted improvement with adherence of protective cream with crusting, Bleed improved with crusting.

## 2023-03-06 LAB
BACTERIA SPEC AEROBE CULT: NORMAL
BACTERIA SPEC AEROBE CULT: NORMAL
T4 FREE SERPL-MCNC: 1.15 NG/DL (ref 0.93–1.7)

## 2023-03-06 PROCEDURE — 97110 THERAPEUTIC EXERCISES: CPT

## 2023-03-06 PROCEDURE — 97530 THERAPEUTIC ACTIVITIES: CPT

## 2023-03-06 PROCEDURE — 99232 SBSQ HOSP IP/OBS MODERATE 35: CPT | Performed by: INTERNAL MEDICINE

## 2023-03-06 PROCEDURE — 94664 DEMO&/EVAL PT USE INHALER: CPT

## 2023-03-06 PROCEDURE — 63710000001 PREDNISONE PER 5 MG: Performed by: INTERNAL MEDICINE

## 2023-03-06 PROCEDURE — 94799 UNLISTED PULMONARY SVC/PX: CPT

## 2023-03-06 PROCEDURE — 97535 SELF CARE MNGMENT TRAINING: CPT

## 2023-03-06 RX ADMIN — Medication 10 ML: at 21:51

## 2023-03-06 RX ADMIN — HYDROCODONE BITARTRATE AND ACETAMINOPHEN 1 TABLET: 5; 325 TABLET ORAL at 09:57

## 2023-03-06 RX ADMIN — Medication 1 APPLICATION: at 09:00

## 2023-03-06 RX ADMIN — Medication 10 ML: at 13:21

## 2023-03-06 RX ADMIN — APIXABAN 5 MG: 5 TABLET, FILM COATED ORAL at 09:48

## 2023-03-06 RX ADMIN — BISOPROLOL FUMARATE 10 MG: 5 TABLET, FILM COATED ORAL at 09:48

## 2023-03-06 RX ADMIN — PREDNISONE 10 MG: 10 TABLET ORAL at 09:48

## 2023-03-06 RX ADMIN — LOPERAMIDE HYDROCHLORIDE 2 MG: 2 CAPSULE ORAL at 15:31

## 2023-03-06 RX ADMIN — LOPERAMIDE HYDROCHLORIDE 2 MG: 2 CAPSULE ORAL at 09:57

## 2023-03-06 RX ADMIN — ATORVASTATIN CALCIUM 20 MG: 20 TABLET, FILM COATED ORAL at 21:51

## 2023-03-06 RX ADMIN — BUDESONIDE AND FORMOTEROL FUMARATE DIHYDRATE 2 PUFF: 160; 4.5 AEROSOL RESPIRATORY (INHALATION) at 22:58

## 2023-03-06 RX ADMIN — APIXABAN 5 MG: 5 TABLET, FILM COATED ORAL at 21:51

## 2023-03-06 RX ADMIN — BUDESONIDE AND FORMOTEROL FUMARATE DIHYDRATE 2 PUFF: 160; 4.5 AEROSOL RESPIRATORY (INHALATION) at 08:25

## 2023-03-06 RX ADMIN — Medication 1 APPLICATION: at 21:52

## 2023-03-06 NOTE — THERAPY TREATMENT NOTE
Patient Name: Sol Lovelace  : 1940    MRN: [id number]                              Today's Date: 3/6/2023       Admit Date: 3/1/2023    Visit Dx: No diagnosis found.  Patient Active Problem List   Diagnosis   • E. coli UTI   • Hyponatremia   • Elevated liver enzymes   • Hypoalbuminemia   • Leukocytosis   • Proteinuria   • Paroxysmal atrial fibrillation (HCC)   • Essential hypertension   • Acute urinary retention   • Current chronic use of systemic steroids   • Adrenal insufficiency due to corticosteroid withdrawal ([facility])   • Severe obesity (BMI 35.0-35.9 with comorbidity) ([facility])   • Moderate asthma with exacerbation   • Diarrhea   • Buttock wound   • Chronic indwelling Ayala catheter     Past Medical History:   Diagnosis Date   • Asthma    • Chronic back pain    • GERD (gastroesophageal reflux disease)    • Hyperlipidemia    • Hypertension    • Osteoarthritis    • Rheumatoid arthritis ([facility])      Past Surgical History:   Procedure Laterality Date   • LAPAROSCOPIC VAGINAL HYSTERECTOMY      BSO, bladder suspension      General Information     Row Name 23 1106          Physical Therapy Time and Intention    Document Type therapy note (daily note)  -NS     Mode of Treatment individual therapy;physical therapy  -NS     Row Name 23 1106          General Information    Patient Profile Reviewed yes  -NS     Existing Precautions/Restrictions fall  -NS     Row Name 23 1106          Cognition    Orientation Status (Cognition) oriented x 3  some situation confusion noted  -NS     Row Name 23 1106          Safety Issues, Functional Mobility    Safety Issues Affecting Function (Mobility) insight into deficits/self-awareness;judgment;problem-solving;safety precaution awareness;safety precautions follow-through/compliance;sequencing abilities  -NS     Impairments Affecting Function (Mobility) balance;cognition;coordination;endurance/activity tolerance;motor planning;strength;postural/trunk  control;range of motion (ROM)  -NS           User Key  (r) = Recorded By, (t) = Taken By, (c) = Cosigned By    Initials Name Provider Type    Ness Hinson PT Physical Therapist               Mobility     Row Name 03/06/23 1106          Bed Mobility    Bed Mobility rolling left;rolling right  -NS     Rolling Left [place] (Bed Mobility) maximum assist (25% patient effort);2 person assist;verbal cues  -NS     Rolling Right [place] (Bed Mobility) maximum assist (25% patient effort);2 person assist;verbal cues  -NS     Assistive Device (Bed Mobility) bed rails;draw sheet  -NS     Comment, (Bed Mobility) B rolling for placement of sling, pt demonstrates difficulty with transfer initiation but is able to assist with UEs  -NS     Row Name 03/06/23 1106          Transfers    Comment, (Transfers) Lift to chair for safety. Pt unable to tolerate unsupported sitting long enough to attempt standing today, remains weak at her core.  -NS     Row Name 03/06/23 1106          Bed-Chair Transfer    Bed-Chair [place] (Transfers) dependent (less than 25% patient effort);2 person assist  -NS     Assistive Device (Bed-Chair Transfers) lift device  -NS           User Key  (r) = Recorded By, (t) = Taken By, (c) = Cosigned By    Initials Name Provider Type    Ness Hinson PT Physical Therapist               Obj/Interventions     Row Name 03/06/23 1106          Motor Skills    Therapeutic Exercise hip;knee;ankle  -[facility] Name 03/06/23 1106          Hip (Therapeutic Exercise)    Hip (Therapeutic Exercise) strengthening exercise  -NS     Hip Strengthening (Therapeutic Exercise) bilateral;aBduction;aDduction;10 repetitions;marching while seated  -NS     Row Name 03/06/23 1106          Knee (Therapeutic Exercise)    Knee (Therapeutic Exercise) strengthening exercise  -NS     Knee Strengthening (Therapeutic Exercise) right;LAQ (long arc quad);10 repetitions;left;5 repetitions  -NS     Row Name 03/06/23 1106           Ankle (Therapeutic Exercise)    Ankle (Therapeutic Exercise) AROM (active range of motion)  -NS     Ankle AROM (Therapeutic Exercise) bilateral;plantarflexion;dorsiflexion;10 repetitions  -NS     Row Name 03/06/23 1106          Balance    Balance Assessment sitting static balance;sitting dynamic balance  -NS     Static Sitting Balance standby assist  -NS     Dynamic Sitting Balance contact guard  -NS     Position, Sitting Balance supported;sitting in chair  -NS     Comment, Balance Patient practiced A/P and lateral weight shifts x5 reps each but fatigued very quickly.  -NS           User Key  (r) = Recorded By, (t) = Taken By, (c) = Cosigned By    Initials Name Provider Type    Ness Hinson PT Physical Therapist               Goals/Plan    No documentation.                Clinical Impression     Row Name 03/06/23 1106          Pain    Pretreatment Pain Rating 0/10 - no pain  -NS     Posttreatment Pain Rating 0/10 - no pain  -NS     Row Name 03/06/23 1106          Plan of Care Review    Plan of Care Reviewed With patient  -NS     Progress no change  -NS     Outcome Evaluation Patient continues to be limited by weakness, decreased activity tolerance, and decreased trunk control affecting functional mobility. Pt gave good effort towards activities aimed at core activation but fatigues quickly. Will continue skilled IP PT to address deficits and support return to baseline.  -NS     Row Name 03/06/23 1106          Vital Signs    Pre Systolic BP Rehab --  VSS- RN cleared for PT  -NS     Pre Patient Position Supine  -NS     Intra Patient Position Standing  -NS     Post Patient Position Sitting  -NS     Row Name 03/06/23 1106          Positioning and Restraints    Pre-Treatment Position in bed  -NS     Post Treatment Position chair  -NS     In Chair notified nsg;sitting;call light within reach;encouraged to call for assist;exit alarm on;with OT;waffle cushion;on mechanical lift sling  -NS           User Key  (r) =  Recorded By, (t) = Taken By, (c) = Cosigned By    Initials Name Provider Type    Ness Hinson PT Physical Therapist               Outcome Measures     Row Name 03/06/23 1106          How much help from another person do you currently need...    Turning from your back to your side while in flat bed without using bedrails? 1  -NS     Moving from lying on back to sitting on the side of a flat bed without bedrails? 1  -NS     Moving to and from a bed to a chair (including a wheelchair)? 1  -NS     Standing up from a chair using your arms (e.g., wheelchair, bedside chair)? 1  -NS     Climbing 3-5 steps with a railing? 1  -NS     To walk in hospital room? 1  -NS     AM-PAC 6 Clicks Score (PT) 6  -NS     Highest level of mobility 2 --> Bed activities/dependent transfer  -NS     Row Name 03/06/23 1106          Functional Assessment    Outcome Measure Options AM-PAC 6 Clicks Basic Mobility (PT)  -NS           User Key  (r) = Recorded By, (t) = Taken By, (c) = Cosigned By    Initials Name Provider Type    Ness Hinson PT Physical Therapist                             Physical Therapy Education     Title: PT OT SLP Therapies (In Progress)     Topic: Physical Therapy (Done)     Point: Mobility training (Done)     Learning Progress Summary           Patient Acceptance, E, VU,NR by NS at 3/6/2023 1334    Acceptance, E, VU by KR at 3/2/2023 0914                   Point: Home exercise program (Done)     Learning Progress Summary           Patient Acceptance, E, VU,NR by NS at 3/6/2023 1334    Acceptance, E, VU by KR at 3/2/2023 0914                   Point: Body mechanics (Done)     Learning Progress Summary           Patient Acceptance, E, VU,NR by NS at 3/6/2023 1334    Acceptance, E, VU by KR at 3/2/2023 0914                   Point: Precautions (Done)     Learning Progress Summary           Patient Acceptance, E, VU,NR by NS at 3/6/2023 1334    Acceptance, E, VU by KR at 3/2/2023 0914                                User Key     Initials Effective Dates Name Provider Type Discipline    NS 06/16/21 -  Ness Murguia PT Physical Therapist PT    KR 12/30/22 -  Kristel Albright PT Physical Therapist PT              PT Recommendation and Plan     Plan of Care Reviewed With: patient  Progress: no change  Outcome Evaluation: Patient continues to be limited by weakness, decreased activity tolerance, and decreased trunk control affecting functional mobility. Pt gave good effort towards activities aimed at core activation but fatigues quickly. Will continue skilled IP PT to address deficits and support return to baseline.     Time Calculation:    PT Charges     Row Name 03/06/23 1106             Time Calculation    Start Time 1106  -NS      PT Received On 03/06/23  -NS      PT Goal Re-Cert Due Date 03/12/23  -NS         Timed Charges    74828 - PT Therapeutic Exercise Minutes 8  -NS      01718 - PT Therapeutic Activity Minutes 15  -NS         Total Minutes    Timed Charges Total Minutes 23  -NS       Total Minutes 23  -NS            User Key  (r) = Recorded By, (t) = Taken By, (c) = Cosigned By    Initials Name Provider Type    NS Ness Murguia PT Physical Therapist              Therapy Charges for Today     Code Description Service Date Service Provider Modifiers Qty    39669291979 HC PT THERAPEUTIC ACT EA 15 MIN 3/6/2023 Ness Murguia, PT GP 1    59070997352 HC PT THER PROC EA 15 MIN 3/6/2023 Ness Murguia, PT GP 1          PT G-Codes  Outcome Measure Options: AM-PAC 6 Clicks Basic Mobility (PT)  AM-PAC 6 Clicks Score (PT): 6  AM-PAC 6 Clicks Score (OT): 7       Nses Murguia PT  3/6/2023

## 2023-03-06 NOTE — PLAN OF CARE
Goal Outcome Evaluation:  Plan of Care Reviewed With: patient        Progress: no change  Outcome Evaluation: Pt transferred to chair via mechanical lift, required MaxAx2 for bed mobility and sling placement. Pt performed oral care seated in chair with DOVER/cues for motor planning. Pt Ox3 with occasional situational confusion. Pt continues to be limited by decreased tolerance to sitting unsupported and generalized weakness. Continue to progress as able per OT POC.

## 2023-03-06 NOTE — THERAPY TREATMENT NOTE
Patient Name: Sol Lovelace  : 1940    MRN: [id number]                              Today's Date: 3/6/2023       Admit Date: 3/1/2023    Visit Dx: No diagnosis found.  Patient Active Problem List   Diagnosis   • E. coli UTI   • Hyponatremia   • Elevated liver enzymes   • Hypoalbuminemia   • Leukocytosis   • Proteinuria   • Paroxysmal atrial fibrillation (HCC)   • Essential hypertension   • Acute urinary retention   • Current chronic use of systemic steroids   • Adrenal insufficiency due to corticosteroid withdrawal ([facility])   • Severe obesity (BMI 35.0-35.9 with comorbidity) ([facility])   • Moderate asthma with exacerbation   • Diarrhea   • Buttock wound   • Chronic indwelling Ayala catheter     Past Medical History:   Diagnosis Date   • Asthma    • Chronic back pain    • GERD (gastroesophageal reflux disease)    • Hyperlipidemia    • Hypertension    • Osteoarthritis    • Rheumatoid arthritis ([facility])      Past Surgical History:   Procedure Laterality Date   • LAPAROSCOPIC VAGINAL HYSTERECTOMY      BSO, bladder suspension      General Information     Row Name 23 1325          OT Time and Intention    Document Type therapy note (daily note)  -     Mode of Treatment occupational therapy  -     Row Name 23 4056          General Information    Patient Profile Reviewed yes  -PAYTON     Existing Precautions/Restrictions fall;other (see comments)  Contact Spore; Ayala catheter; L/R gluteal pressure injuries  -     Barriers to Rehab medically complex;previous functional deficit;physical barrier  -     Row Name 23 5172          Occupational Profile    Environmental Supports and Barriers (Occupational Profile) Pt reports she typically sleeps in recliner at home  -     Row Name 23 1328          Cognition    Orientation Status (Cognition) oriented x 3;other (see comments)  situational confusion  -     Row Name 23 1323          Safety Issues, Functional Mobility    Safety Issues Affecting  Function (Mobility) awareness of need for assistance;friction/shear risk;insight into deficits/self-awareness;judgment;problem-solving;safety precaution awareness;safety precautions follow-through/compliance;sequencing abilities  -     Impairments Affecting Function (Mobility) balance;cognition;coordination;endurance/activity tolerance;motor planning;pain;postural/trunk control;strength  -     Cognitive Impairments, Mobility Safety/Performance awareness, need for assistance;insight into deficits/self-awareness;judgment;problem-solving/reasoning;safety precaution awareness;safety precaution follow-through;sequencing abilities  -           User Key  (r) = Recorded By, (t) = Taken By, (c) = Cosigned By    Initials Name Provider Type    Jayashree Thompson OT Occupational Therapist                 Mobility/ADL's     Row Name 03/06/23 1328          Bed Mobility    Bed Mobility rolling left;rolling right  -     Rolling Left [place] (Bed Mobility) maximum assist (25% patient effort);2 person assist;verbal cues;nonverbal cues (demo/gesture)  -     Rolling Right [place] (Bed Mobility) maximum assist (25% patient effort);2 person assist;verbal cues;nonverbal cues (demo/gesture)  -     Assistive Device (Bed Mobility) bed rails;draw sheet  -     Comment, (Bed Mobility) v/t cues for sequencing; rolled R/L for sling placement  -     Row Name 03/06/23 1328          Transfers    Transfers bed-chair transfer  -     Row Name 03/06/23 1328          Bed-Chair Transfer    Bed-Chair [place] (Transfers) dependent (less than 25% patient effort);2 person assist  -     Assistive Device (Bed-Chair Transfers) lift device  -PAYTON     Comment, (Bed-Chair Transfer) Pt transferred to chair via mechanical lift  -     Row Name 03/06/23 1328          Sit-Stand Transfer    Comment, (Sit-Stand Transfer) Unsafe to attempt d/t poor tolerance to sitting unsupported with rest breaks needed throughout activity  -     Row  Name 03/06/23 1328          Activities of Daily Living    BADL Assessment/Intervention grooming;lower body dressing  -PAYTON     Row Name 03/06/23 1328          Grooming Assessment/Training    [place] Level (Grooming) oral care regimen;wash face, hands;supervision;verbal cues;hair care, combing/brushing;dependent (less than 25% patient effort)  -PAYTON     Position (Grooming) supported sitting  -PAYTON     Comment, (Grooming) Dep for hairbrushing; DOVER for face/hand washing, cues for motor planning oral care  -PAYTON     Row Name 03/06/23 1328          Lower Body Dressing Assessment/Training    [place] Level (Lower Body Dressing) don;socks;dependent (less than 25% patient effort)  -PAYTON     Position (Lower Body Dressing) supported sitting  -PAYTON           User Key  (r) = Recorded By, (t) = Taken By, (c) = Cosigned By    Initials Name Provider Type    Jayashree Thompson OT Occupational Therapist               Obj/Interventions     Row Name 03/06/23 1333          Shoulder (Therapeutic Exercise)    Shoulder (Therapeutic Exercise) AROM (active range of motion)  -PAYTON     Shoulder AROM (Therapeutic Exercise) bilateral;flexion;supine;10 repetitions;other (see comments)  demo/tactile cues for initiation  -PAYTON     Row Name 03/06/23 1333          Motor Skills    Therapeutic Exercise shoulder  -PAYTON           User Key  (r) = Recorded By, (t) = Taken By, (c) = Cosigned By    Initials Name Provider Type    Jayashree Thompson OT Occupational Therapist               Goals/Plan    No documentation.                Clinical Impression     Row Name 03/06/23 1336          Pain Assessment    Additional Documentation Pain Scale: FACES Pre/Post-Treatment (Group)  -PAYTON     Row Name 03/06/23 6947          Pain Scale: FACES Pre/Post-Treatment    Pain: FACES Scale, Pretreatment 0-->no hurt  -PAYTON     Posttreatment Pain Rating 4-->hurts little more  -PAYTON     Pain Location - Side/Orientation Left  -PAYTON     Pain Location lower  -PAYTON     Pain Location - extremity  -PAYTON      Pre/Posttreatment Pain Comment Pt c/o LLE pain with bed mobility and repositioning; RN notified  -PAYTON     Row Name 03/06/23 1334          Plan of Care Review    Plan of Care Reviewed With patient  -PAYTON     Progress no change  -PAYTON     Outcome Evaluation Pt transferred to chair via mechanical lift, required MaxAx2 for bed mobility and sling placement. Pt performed oral care seated in chair with DOVER/cues for motor planning. Pt Ox3 with occasional situational confusion. Pt continues to be limited by decreased tolerance to sitting unsupported and generalized weakness. Continue to progress as able per OT POC.  -PAYTON     Row Name 03/06/23 1334          Vital Signs    O2 Delivery Pre Treatment room air  -PAYTON     O2 Delivery Intra Treatment room air  -PAYTON     O2 Delivery Post Treatment room air  -PAYTON     Pre Patient Position Supine  -PAYTON     Intra Patient Position Side Lying  -PAYTON     Post Patient Position Sitting  -PAYTON     Row Name 03/06/23 133          Positioning and Restraints    Pre-Treatment Position in bed  -PAYTON     Post Treatment Position chair  -PAYTON     In Chair notified nsg;reclined;call light within reach;encouraged to call for assist;exit alarm on;waffle cushion;legs elevated;waffle boot/both  -PAYTON           User Key  (r) = Recorded By, (t) = Taken By, (c) = Cosigned By    Initials Name Provider Type    Jayashree Thompson, TEO Occupational Therapist               Outcome Measures     Row Name 03/06/23 1341          How much help from another is currently needed...    Putting on and taking off regular lower body clothing? 1  -PAYTON     Bathing (including washing, rinsing, and drying) 1  -PAYTON     Toileting (which includes using toilet bed pan or urinal) 1  -PAYTON     Putting on and taking off regular upper body clothing 2  -PAYTON     Taking care of personal grooming (such as brushing teeth) 3  -PAYTON     Eating meals 2  -PAYTON     AM-PAC 6 Clicks Score (OT) 10  -PAYTON     Row Name 03/06/23 1106          How much help from another person do you  currently need...    Turning from your back to your side while in flat bed without using bedrails? 1  -NS     Moving from lying on back to sitting on the side of a flat bed without bedrails? 1  -NS     Moving to and from a bed to a chair (including a wheelchair)? 1  -NS     Standing up from a chair using your arms (e.g., wheelchair, bedside chair)? 1  -NS     Climbing 3-5 steps with a railing? 1  -NS     To walk in hospital room? 1  -NS     AM-PAC 6 Clicks Score (PT) 6  -NS     Highest level of mobility 2 --> Bed activities/dependent transfer  -NS     Row Name 03/06/23 1340 03/06/23 1106       Functional Assessment    Outcome Measure Options AM-PAC 6 Clicks Daily Activity (OT)  -PAYTON AM-PAC 6 Clicks Basic Mobility (PT)  -NS          User Key  (r) = Recorded By, (t) = Taken By, (c) = Cosigned By    Initials Name Provider Type    Ness Hinson, PT Physical Therapist    Jayashree Thompson, OT Occupational Therapist                Occupational Therapy Education     Title: PT OT SLP Therapies (In Progress)     Topic: Occupational Therapy (In Progress)     Point: ADL training (In Progress)     Description:   Instruct learner(s) on proper safety adaptation and remediation techniques during self care or transfers.   Instruct in proper use of assistive devices.              Learning Progress Summary           Patient Acceptance, E, NL,NR by PAYTON at 3/6/2023 1341    Comment: OT POC; transfer training; benefits of activity    Acceptance, E, NR by  at 3/2/2023 1035                   Point: Home exercise program (In Progress)     Description:   Instruct learner(s) on appropriate technique for monitoring, assisting and/or progressing therapeutic exercises/activities.              Learning Progress Summary           Patient Acceptance, E, NL,NR by PAYTON at 3/6/2023 1341    Comment: OT POC; transfer training; benefits of activity    Acceptance, E, NR by  at 3/2/2023 1035                   Point: Precautions (In Progress)      Description:   Instruct learner(s) on prescribed precautions during self-care and functional transfers.              Learning Progress Summary           Patient Acceptance, E, NR by  at 3/2/2023 1035                   Point: Body mechanics (In Progress)     Description:   Instruct learner(s) on proper positioning and spine alignment during self-care, functional mobility activities and/or exercises.              Learning Progress Summary           Patient Acceptance, E, NL,NR by  at 3/6/2023 1341    Comment: OT POC; transfer training; benefits of activity    Acceptance, E, NR by  at 3/2/2023 1035                               User Key     Initials Effective Dates Name Provider Type Discipline    PAYTON 06/16/21 -  Jayashree Hernandes OT Occupational Therapist OT     10/14/22 -  Natalie Mack OT Occupational Therapist OT              OT Recommendation and Plan     Plan of Care Review  Plan of Care Reviewed With: patient  Progress: no change  Outcome Evaluation: Pt transferred to chair via mechanical lift, required MaxAx2 for bed mobility and sling placement. Pt performed oral care seated in chair with DOVER/cues for motor planning. Pt Ox3 with occasional situational confusion. Pt continues to be limited by decreased tolerance to sitting unsupported and generalized weakness. Continue to progress as able per OT POC.     Time Calculation:    Time Calculation- OT     Row Name 03/06/23 1125             Time Calculation- OT    OT Start Time 1125  -PAYTON      OT Received On 03/06/23  -PAYTON         Timed Charges    10520 - OT Therapeutic Exercise Minutes 8  -PAYTON      47985 - OT Self Care/Mgmt Minutes 18  -PAYTON         Total Minutes    Timed Charges Total Minutes 26  -PAYTON       Total Minutes 26  -PAYTON            User Key  (r) = Recorded By, (t) = Taken By, (c) = Cosigned By    Initials Name Provider Type    Jayashree Thompson OT Occupational Therapist              Therapy Charges for Today     Code Description Service Date Service  Provider Modifiers Qty    08499762695 HC OT THER PROC EA 15 MIN 3/6/2023 Jayashree Hernandes OT GO 1    00570286432 HC OT SELF CARE/MGMT/TRAIN EA 15 MIN 3/6/2023 Jayashree Hernandes OT GO 1               Jayashree Hernandes OT  3/6/2023

## 2023-03-06 NOTE — CASE MANAGEMENT/SOCIAL WORK
Continued Stay Note  [facility]     Patient Name: Sol Lovelace  MRN: [id number]  Today's Date: 3/6/2023    Admit Date: 3/1/2023    Plan: Rehab   Discharge Plan     Row Name 03/06/23 1347       Plan    Plan Rehab    Patient/Family in Agreement with Plan yes    Plan Comments Spoke with patient at bedside and called daughter by phone. Patient and daughter would like to explore rehab option. Patient has been to [facility] before and liked it there. CM made referrals to [facility]. Patient will likely need transport. CM will follow.    Final Discharge Disposition Code 03 - skilled nursing facility (SNF)               Discharge Codes    No documentation.               Expected Discharge Date and Time     Expected Discharge Date Expected Discharge Time    Mar 6, 2023             Michelle Chu RN

## 2023-03-06 NOTE — PLAN OF CARE
Goal Outcome Evaluation:  Plan of Care Reviewed With: patient        Progress: no change  Outcome Evaluation: Patient continues to be limited by weakness, decreased activity tolerance, and decreased trunk control affecting functional mobility. Pt gave good effort towards activities aimed at core activation but fatigues quickly. Will continue skilled IP PT to address deficits and support return to baseline.

## 2023-03-06 NOTE — PROGRESS NOTES
[facility] Medicine Services  PROGRESS NOTE    Patient Name: Sol Lovelace  : 1940  MRN: [id number]    Date of Admission: 3/1/2023  Primary Care Physician: Young Huynh MD    Subjective   Subjective     CC:  diarrhea    HPI:  3 BMs overnight per nursing staff, not charted in full    Patient with some cramping abdominal pain - wanting to avoid food today which we discuss    ROS:  Gen- No fevers, chills  CV- No chest pain, palpitations  Resp- No cough, dyspnea    Objective   Objective     Vital Signs:   Temp:  [95.2 °F (35.1 °C)-97.3 °F (36.3 °C)] 95.2 °F (35.1 °C)  Heart Rate:  [67-88] 88  Resp:  [16-18] 18  BP: (116-147)/(72-96) 134/89  Flow (L/min):  [2] 2     Physical Exam:  Constitutional: Chronically ill appearing female lying in bed  HENT: NCAT, mucous membranes moist  Respiratory: Clear to auscultation bilaterally, respiratory effort normal   Cardiovascular: RRR, no murmurs, rubs, or gallops  Gastrointestinal: Soft, nontender, nondistended  Musculoskeletal: Muscle tone decreased throughout, no joint effusions appreciated  Psychiatric: Flat affect, cooperative  Neurologic: Alert and oriented, facial movements symmetric and spontaneous movement of all 4 extremities grossly equal bilaterally, speech clear  Skin: Easy bruising superficially, doughy skin, wounds not examined today  : gerber in place w light yellow urine draining    Results Reviewed:  LAB RESULTS:      Lab 23  0407 23  0424 23  1937 23  1229 23  0703 23  0250 23  0012 23  2106 23   WBC 7.38 7.32  --   --   --  11.58*  --   --  12.57*   HEMOGLOBIN 10.6* 9.8*  --   --   --  12.5  --   --  12.9   HEMATOCRIT 33.4* 31.4*  --   --   --  39.4  --   --  41.5   PLATELETS 200 163  --   --   --  153  --   --  164   NEUTROS ABS  --   --   --   --   --  9.31*  --   --  10.47*   IMMATURE GRANS (ABS)  --   --   --   --   --  0.08*  --   --  0.08*   LYMPHS ABS  --    --   --   --   --  1.52  --   --  1.44   MONOS ABS  --   --   --   --   --  0.52  --   --  0.50   EOS ABS  --   --   --   --   --  0.11  --   --  0.03   MCV 89.5 89.0  --   --   --  92.5  --   --  91.4   PROCALCITONIN  --   --   --   --   --   --   --   --  0.33*   LACTATE  --   --  1.5 2.2* 3.3* 3.1* 2.9*   < >  --     < > = values in this interval not displayed.         Lab 03/05/23  0603 03/04/23  0407 03/03/23  0424 03/02/23  1229 03/02/23  0250 03/02/23  0012 03/01/23  2106 03/01/23  2105   SODIUM 138 136 133*  --  132*  --   --  133*   POTASSIUM 4.1 3.4* 3.8 4.1 3.1*  --   --  2.9*   CHLORIDE 105 101 102  --  97*  --   --  96*   CO2 23.0 24.0 24.0  --  20.0*  --   --  22.0   ANION GAP 10.0 11.0 7.0  --  15.0  --   --  15.0   BUN 10 7* 10  --  18  --   --  18   CREATININE 0.25* 0.25* 0.26*  --  0.32*  --   --  0.53*   EGFR 112.2 112.2 111.2  --  105.7  --   --  93.6   GLUCOSE 67 65 72  --  93  --   --  91   CALCIUM 7.7* 7.4* 7.6*  --  7.5*  --   --  8.2*   MAGNESIUM  --   --   --   --  1.4* 1.6  --  1.9   HEMOGLOBIN A1C  --   --   --   --   --   --  5.70*  --    TSH  --   --   --   --   --   --   --  5.390*         Lab 03/01/23  2105   TOTAL PROTEIN 5.3*   ALBUMIN 2.5*   GLOBULIN 2.8   ALT (SGPT) 15   AST (SGOT) 25   BILIRUBIN 0.9   ALK PHOS 69         Lab 03/02/23  0012 03/01/23 2105   HSTROP T 48* 67*                 Brief Urine Lab Results  (Last result in the past 365 days)      Color   Clarity   Blood   Leuk Est   Nitrite   Protein   CREAT   Urine HCG        03/03/23 1559 Orange   Cloudy   Large (3+)   Small (1+)   Negative   30 mg/dL (1+)                 Microbiology Results Abnormal     Procedure Component Value - Date/Time    Blood Culture - Blood, Hand, Left [[id number]]  (Normal) Collected: 03/01/23 2105    Lab Status: Preliminary result Specimen: Blood from Hand, Left Updated: 03/05/23 2130     Blood Culture No growth at 4 days    Narrative:      Aerobic bottle only      Blood Culture - Blood, Arm,  Left [[id number]]  (Normal) Collected: 03/01/23 2105    Lab Status: Preliminary result Specimen: Blood from Arm, Left Updated: 03/05/23 2130     Blood Culture No growth at 4 days    Narrative:      Aerobic bottle only      Urine Culture - Urine, Indwelling Urethral Catheter [[id number]]  (Normal) Collected: 03/03/23 1559    Lab Status: Final result Specimen: Urine from Indwelling Urethral Catheter Updated: 03/04/23 2033     Urine Culture No growth    Clostridioides difficile toxin Ag, Reflex - Stool, Per Rectum [[id number]]  (Normal) Collected: 03/02/23 1828    Lab Status: Final result Specimen: Stool from Per Rectum Updated: 03/02/23 2106     C.diff Toxin Ag Negative    Narrative:      DNA from a toxigenic strain of C.difficile was detected, although the free toxin itself was not detected. These findings are consistent with C.difficile colonization and may not reflect actual C.difficile infection. Clinical correlation needed.    Gastrointestinal Panel, PCR - Stool, Per Rectum [[id number]]  (Normal) Collected: 03/02/23 1828    Lab Status: Final result Specimen: Stool from Per Rectum Updated: 03/02/23 2030     Campylobacter Not Detected     Plesiomonas shigelloides Not Detected     Salmonella Not Detected     Vibrio Not Detected     Vibrio cholerae Not Detected     Yersinia enterocolitica Not Detected     Enteroaggregative E. coli (EAEC) Not Detected     Enteropathogenic E. coli (EPEC) Not Detected     Enterotoxigenic E. coli (ETEC) lt/st Not Detected     Shiga-like toxin-producing E. coli (STEC) stx1/stx2 Not Detected     Shigella/Enteroinvasive E. coli (EIEC) Not Detected     Cryptosporidium Not Detected     Cyclospora cayetanensis Not Detected     Entamoeba histolytica Not Detected     Giardia lamblia Not Detected     Adenovirus F40/41 Not Detected     Astrovirus Not Detected     Norovirus GI/GII Not Detected     Rotavirus A Not Detected     Sapovirus (I, II, IV or V) Not Detected          No radiology results from  the last 24 hrs    Results for orders placed during the hospital encounter of 10/18/22    Adult Transthoracic Echo Complete W/ Cont if Necessary Per Protocol    Interpretation Summary  •  Estimated left ventricular EF = 55% Left ventricular systolic function is normal.  •  Estimated right ventricular systolic pressure from tricuspid regurgitation is normal (<35 mmHg). Calculated right ventricular systolic pressure from tricuspid regurgitation is 28 mmHg.      Current medications:  Scheduled Meds:apixaban, 5 mg, Oral, Q12H  atorvastatin, 20 mg, Oral, Nightly  bisoprolol, 10 mg, Oral, Q24H  budesonide-formoterol, 2 puff, Inhalation, BID - RT  predniSONE, 10 mg, Oral, Daily With Breakfast  sodium chloride, 10 mL, Intravenous, Q12H  zinc oxide, 1 application, Topical, Q12H      Continuous Infusions:Pharmacy Consult,       PRN Meds:.•  acetaminophen  •  albuterol  •  Calcium Replacement - Initiate Nurse / BPA Driven Protocol  •  HYDROcodone-acetaminophen  •  loperamide  •  Magnesium Standard Dose Replacement - Initiate Nurse / BPA Driven Protocol  •  Pharmacy Consult  •  Phosphorus Replacement - Initiate Nurse / BPA Driven Protocol  •  Potassium Replacement - Initiate Nurse / BPA Driven Protocol  •  sodium chloride  •  sodium chloride    Assessment & Plan   Assessment & Plan     Active Hospital Problems    Diagnosis  POA   • **Diarrhea [R19.7]  Yes   • Buttock wound [S31.809A]  Yes   • Chronic indwelling Ayala catheter [Z97.8]  Not Applicable   • Current chronic use of systemic steroids [Z79.52]  Not Applicable   • 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        Brief Hospital Course to date:  Sol Lovelace is a 82 y.o. female w  adrenal insufficiency 2/2 chronic steroid use, debility and bedbound status, urinary retention requiring gerbre who presents w profuse diarrhea x days. Found to have sacral wounds on examination.    Severe sepsis 2/2 diarrheal illness w lactic acidosis - improving  - GI PCR negative, C diff toxin positive, Atg negative not active Cdiff  - spore precautions per infection control: pending recs to clarify this  - since arriving here, BM improving slowly  - OK for imodium PRN    MASD, incontinence associated skin damage  -no s/s of fistula or deeper wound  -OK for rectal tube if profuse diarrhea restarts to prevent worsening     Chronic steroids use w adrenal insufficiency  -chronic prednisone 10 mg daily. H/o hyponatremia in past off steroids  -maintaining glucose, d/c checks. Some lower temps charted but appears controlled otherwise    Pyuria in setting of chronic Gerber - s/p 5 days ceftriaxone, suspect E coli colonization in retrospect after dx review    Afib -rate controled, cont eliquis and bisoprolol for rate control  Relative hypotension in setting of chronic HTN, improved - hold nifedipine  Asthma -cont advair  HLD -cont lipitor  Chronic debility, BMI 37 - complicates daily care  Hypokalemia, likely 2/2 diarrhea - replace per protocol  Hypomagnesemia - replace per protocol    Family hopeful for rehab stay, if no change to condition, then transition to hospice thereafter. Overall, significant debility, worsened from my prior care of her in 10/2022.  Will d/w CM 3/6 - medically ready. PT/OT consulted    Expected Discharge Date and Time     Expected Discharge Date Expected Discharge Time    Mar 8, 2023            DVT prophylaxis:  Medical DVT prophylaxis orders are present.     AM-PAC 6 Clicks Score (PT): 6 (03/02/23 8063)    CODE STATUS:   Code Status and Medical Interventions:   Ordered at: 03/02/23 0919     Medical Intervention Limits:    NO intubation (DNI)     Code Status (Patient has no pulse and is not  breathing):    No CPR (Do Not Attempt to Resuscitate)     Medical Interventions (Patient has pulse or is breathing):    Limited Support     Comments:    no ICU escalation     Release to patient:    Routine Release       Selene Jefferson MD  03/06/23

## 2023-03-06 NOTE — PROGRESS NOTES
Clinical Nutrition     Patient Name: Sol Lovelace  YOB: 1940  MRN: [id number]  Date of Encounter: 23 11:18 EST  Admission date: 3/1/2023    Reason for Visit   Follow up    EMR reviewed    Yes    Diet Nutrition Related History     Patient reports a better appetite/intake and does not need assistance with meals. Intakes have not been recorded since 3/4/23 when patient averaged 23% x last 7 meals. Patient reports drinking her Premier Protein, send BID. (Patient was actively drinking one during our visit.) Patient reported no N/V/D today but RN stated she did have diarrhea this morning.     3/ Previous RD note:  Pt STEPHENIE at time of visit for imaging. Pt with recent diarrhea (~12x daily) for several weeks. Pt with MASD per WOC note with recommendation for FMS. Per CM note pt likely home with hospice. Pt with poor intake - per NA pt may require assistance at meals.      Current Nutrition Prescription    Diet: Regular/House Diet; Texture: Regular Texture (IDDSI 7); Fluid Consistency: Thin (IDDSI 0)    Average Intake from [place]% x last 7 meals but no intakes recorded since 3/4/23    Actions:    Follow treatment progress, Care plan reviewed, Advise alternate selection, Interview for preferences, Encourage intake    Monitor Per Protocol    Lashonda Conklin,   Time Spent: 20 minutes

## 2023-03-06 NOTE — PLAN OF CARE
Goal Outcome Evaluation:            Pt has had 2 episodes of diarrhea, one large, one small.  Both times, pt cleaned per woc orders.  WOC reconsulted due to worsening backside wound.  Pt given immodium with both episodes.  Pt has been turned q2 hours.  Pt sat up in chair with PT.  Pt given one dose of norco for hip pain 4/10.  Pt has chronic gerber still in place. Pt can reach 500 on IS. Pt becoming more confused as evening goes by.

## 2023-03-06 NOTE — PLAN OF CARE
Goal Outcome Evaluation:  Pt's VSS, RA, wound care provided and Pt turned frequently. Continue POC.      Problem: Adult Inpatient Plan of Care  Goal: Plan of Care Review  Outcome: Ongoing, Progressing  Goal: Patient-Specific Goal (Individualized)  Outcome: Ongoing, Progressing  Goal: Absence of Hospital-Acquired Illness or Injury  Outcome: Ongoing, Progressing  Intervention: Identify and Manage Fall Risk  Recent Flowsheet Documentation  Taken 3/6/2023 0600 by Dionne Patterson RN  Safety Promotion/Fall Prevention: safety round/check completed  Taken 3/6/2023 0400 by Dionne Patterson RN  Safety Promotion/Fall Prevention: safety round/check completed  Taken 3/6/2023 0200 by Dionne Patterson RN  Safety Promotion/Fall Prevention: safety round/check completed  Taken 3/6/2023 0000 by Yesenia, Dionne, RN  Safety Promotion/Fall Prevention: safety round/check completed  Taken 3/5/2023 2215 by Yesenia, Dionne, RN  Safety Promotion/Fall Prevention: safety round/check completed  Taken 3/5/2023 2045 by Yesenia, Dionne, RN  Safety Promotion/Fall Prevention:   activity supervised   clutter free environment maintained   fall prevention program maintained   room organization consistent   safety round/check completed  Intervention: Prevent Skin Injury  Recent Flowsheet Documentation  Taken 3/6/2023 0600 by Dionne Patterson RN  Body Position:   side-lying   left  Skin Protection:   adhesive use limited   incontinence pads utilized   tubing/devices free from skin contact  Taken 3/6/2023 0530 by Dionne Patterson RN  Body Position:   turned   left  Taken 3/6/2023 0400 by Dionne Patterson RN  Body Position: tilted  Skin Protection:   adhesive use limited   incontinence pads utilized   tubing/devices free from skin contact  Taken 3/6/2023 0200 by Dionne Patterson RN  Body Position: tilted  Skin Protection:   adhesive use limited   incontinence pads utilized   tubing/devices free from skin  contact  Taken 3/6/2023 0000 by Dionne Patterson RN  Body Position:   side-lying   right  Skin Protection:   adhesive use limited   incontinence pads utilized   tubing/devices free from skin contact  Taken 3/5/2023 2345 by Dionne Patterson RN  Body Position:   turned   right  Taken 3/5/2023 2215 by Dionne Patterson RN  Skin Protection:   adhesive use limited   incontinence pads utilized   tubing/devices free from skin contact  Taken 3/5/2023 2200 by Dionne Patterson RN  Body Position:   weight shifting   turned   left  Taken 3/5/2023 2045 by Dionne Patterson RN  Body Position: tilted  Skin Protection:   adhesive use limited   incontinence pads utilized   tubing/devices free from skin contact  Intervention: Prevent and Manage VTE (Venous Thromboembolism) Risk  Recent Flowsheet Documentation  Taken 3/5/2023 2045 by Dionne Patterson RN  VTE Prevention/Management: (eliquis) other (see comments)  Intervention: Prevent Infection  Recent Flowsheet Documentation  Taken 3/6/2023 0600 by Dionne Patterson RN  Infection Prevention:   cohorting utilized   rest/sleep promoted   single patient room provided  Taken 3/6/2023 0400 by Dionne Patterson RN  Infection Prevention:   cohorting utilized   rest/sleep promoted   single patient room provided  Taken 3/6/2023 0200 by Dionne Patterson RN  Infection Prevention:   cohorting utilized   rest/sleep promoted   single patient room provided  Taken 3/6/2023 0000 by Dionne Patterson RN  Infection Prevention:   cohorting utilized   single patient room provided   rest/sleep promoted  Taken 3/5/2023 2215 by Dionne Patterson RN  Infection Prevention:   cohorting utilized   rest/sleep promoted   single patient room provided  Taken 3/5/2023 2045 by Dionne Patterson RN  Infection Prevention:   cohorting utilized   rest/sleep promoted   single patient room provided  Goal: Optimal Comfort and Wellbeing  Outcome: Ongoing, Progressing  Intervention:  Provide Person-Centered Care  Recent Flowsheet Documentation  Taken 3/5/2023 2045 by Dionne Patterson RN  Trust Relationship/Rapport:   care explained   questions answered   questions encouraged   thoughts/feelings acknowledged  Goal: Readiness for Transition of Care  Outcome: Ongoing, Progressing     Problem: Skin Injury Risk Increased  Goal: Skin Health and Integrity  Outcome: Ongoing, Progressing  Intervention: Optimize Skin Protection  Recent Flowsheet Documentation  Taken 3/6/2023 0600 by Dionne Patterson RN  Pressure Reduction Techniques:   frequent weight shift encouraged   heels elevated off bed   positioned off wounds   pressure points protected   weight shift assistance provided  Head of Bed (HOB) Positioning: HOB at 20-30 degrees  Pressure Reduction Devices:   positioning supports utilized   heel offloading device utilized   specialty bed utilized  Skin Protection:   adhesive use limited   incontinence pads utilized   tubing/devices free from skin contact  Taken 3/6/2023 0530 by Dionne Patterson RN  Head of Bed (HOB) Positioning: HOB at 20-30 degrees  Taken 3/6/2023 0400 by Dionne Patterson RN  Pressure Reduction Techniques:   frequent weight shift encouraged   heels elevated off bed   positioned off wounds   pressure points protected   weight shift assistance provided  Head of Bed (HOB) Positioning: HOB at 20-30 degrees  Pressure Reduction Devices:   heel offloading device utilized   positioning supports utilized   specialty bed utilized  Skin Protection:   adhesive use limited   incontinence pads utilized   tubing/devices free from skin contact  Taken 3/6/2023 0200 by Dionne Patterson RN  Pressure Reduction Techniques:   frequent weight shift encouraged   heels elevated off bed   positioned off wounds   pressure points protected   weight shift assistance provided  Head of Bed (HOB) Positioning: HOB at 20-30 degrees  Pressure Reduction Devices:   heel offloading device utilized    positioning supports utilized   specialty bed utilized  Skin Protection:   adhesive use limited   incontinence pads utilized   tubing/devices free from skin contact  Taken 3/6/2023 0000 by Dionne Patterson RN  Pressure Reduction Techniques:   heels elevated off bed   frequent weight shift encouraged   positioned off wounds   pressure points protected   weight shift assistance provided  Head of Bed (HOB) Positioning: HOB at 20-30 degrees  Pressure Reduction Devices:   heel offloading device utilized   positioning supports utilized   specialty bed utilized  Skin Protection:   adhesive use limited   incontinence pads utilized   tubing/devices free from skin contact  Taken 3/5/2023 2345 by Dionne Patterson RN  Head of Bed ([facility]) Positioning: HOB at 20-30 degrees  Taken 3/5/2023 2215 by Dionne Patterson RN  Pressure Reduction Techniques:   heels elevated off bed   frequent weight shift encouraged   positioned off wounds   pressure points protected   weight shift assistance provided  Pressure Reduction Devices:   heel offloading device utilized   positioning supports utilized   specialty bed utilized  Skin Protection:   adhesive use limited   incontinence pads utilized   tubing/devices free from skin contact  Taken 3/5/2023 2200 by Dionne Patterson RN  Head of Bed ([facility]) Positioning: HOB at 20-30 degrees  Taken 3/5/2023 2045 by Dionne Patterson RN  Pressure Reduction Techniques:   frequent weight shift encouraged   heels elevated off bed   positioned off wounds   pressure points protected   weight shift assistance provided  Head of Bed (HOB) Positioning: HOB at 20-30 degrees  Pressure Reduction Devices:   heel offloading device utilized   positioning supports utilized   specialty bed utilized  Skin Protection:   adhesive use limited   incontinence pads utilized   tubing/devices free from skin contact  Goal: Skin Health and Integrity  Outcome: Ongoing, Progressing  Intervention: Optimize Skin  Protection  Recent Flowsheet Documentation  Taken 3/6/2023 0600 by Dionne Patterson RN  Pressure Reduction Techniques:   frequent weight shift encouraged   heels elevated off bed   positioned off wounds   pressure points protected   weight shift assistance provided  Head of Bed (HOB) Positioning: HOB at 20-30 degrees  Pressure Reduction Devices:   positioning supports utilized   heel offloading device utilized   specialty bed utilized  Skin Protection:   adhesive use limited   incontinence pads utilized   tubing/devices free from skin contact  Taken 3/6/2023 0530 by Dionne Patterson RN  Head of Bed (HOB) Positioning: HOB at 20-30 degrees  Taken 3/6/2023 0400 by Dionne Patterson RN  Pressure Reduction Techniques:   frequent weight shift encouraged   heels elevated off bed   positioned off wounds   pressure points protected   weight shift assistance provided  Head of Bed (HOB) Positioning: HOB at 20-30 degrees  Pressure Reduction Devices:   heel offloading device utilized   positioning supports utilized   specialty bed utilized  Skin Protection:   adhesive use limited   incontinence pads utilized   tubing/devices free from skin contact  Taken 3/6/2023 0200 by Dionne Patterson RN  Pressure Reduction Techniques:   frequent weight shift encouraged   heels elevated off bed   positioned off wounds   pressure points protected   weight shift assistance provided  Head of Bed (HOB) Positioning: HOB at 20-30 degrees  Pressure Reduction Devices:   heel offloading device utilized   positioning supports utilized   specialty bed utilized  Skin Protection:   adhesive use limited   incontinence pads utilized   tubing/devices free from skin contact  Taken 3/6/2023 0000 by Dionne Patterson RN  Pressure Reduction Techniques:   heels elevated off bed   frequent weight shift encouraged   positioned off wounds   pressure points protected   weight shift assistance provided  Head of Bed (HOB) Positioning: HOB at 20-30  degrees  Pressure Reduction Devices:   heel offloading device utilized   positioning supports utilized   specialty bed utilized  Skin Protection:   adhesive use limited   incontinence pads utilized   tubing/devices free from skin contact  Taken 3/5/2023 2345 by Dionne Patterson RN  Head of Bed ([facility]) Positioning: HOB at 20-30 degrees  Taken 3/5/2023 2215 by Dionne Patterson RN  Pressure Reduction Techniques:   heels elevated off bed   frequent weight shift encouraged   positioned off wounds   pressure points protected   weight shift assistance provided  Pressure Reduction Devices:   heel offloading device utilized   positioning supports utilized   specialty bed utilized  Skin Protection:   adhesive use limited   incontinence pads utilized   tubing/devices free from skin contact  Taken 3/5/2023 2200 by Dionne Patterson RN  Head of Bed ([facility]) Positioning: HOB at 20-30 degrees  Taken 3/5/2023 2045 by Dionne Patterson RN  Pressure Reduction Techniques:   frequent weight shift encouraged   heels elevated off bed   positioned off wounds   pressure points protected   weight shift assistance provided  Head of Bed ([facility]) Positioning: HOB at 20-30 degrees  Pressure Reduction Devices:   heel offloading device utilized   positioning supports utilized   specialty bed utilized  Skin Protection:   adhesive use limited   incontinence pads utilized   tubing/devices free from skin contact     Problem: Fall Injury Risk  Goal: Absence of Fall and Fall-Related Injury  Outcome: Ongoing, Progressing  Intervention: Identify and Manage Contributors  Recent Flowsheet Documentation  Taken 3/5/2023 2045 by Dionne Patterson RN  Medication Review/Management: medications reviewed  Intervention: Promote Injury-Free Environment  Recent Flowsheet Documentation  Taken 3/6/2023 0600 by Dionne Patterson RN  Safety Promotion/Fall Prevention: safety round/check completed  Taken 3/6/2023 0400 by Dionne Patterson RN  Safety  Promotion/Fall Prevention: safety round/check completed  Taken 3/6/2023 0200 by Dionne Patterson RN  Safety Promotion/Fall Prevention: safety round/check completed  Taken 3/6/2023 0000 by Yesenia, Dionne, RN  Safety Promotion/Fall Prevention: safety round/check completed  Taken 3/5/2023 2215 by Yesenia, Dionne, RN  Safety Promotion/Fall Prevention: safety round/check completed  Taken 3/5/2023 2045 by Yesenia, Dionne, RN  Safety Promotion/Fall Prevention:   activity supervised   clutter free environment maintained   fall prevention program maintained   room organization consistent   safety round/check completed     Problem: UTI (Urinary Tract Infection)  Goal: Improved Infection Symptoms  Outcome: Ongoing, Progressing     Problem: Asthma Comorbidity  Goal: Maintenance of Asthma Control  Outcome: Ongoing, Progressing  Intervention: Maintain Asthma Symptom Control  Recent Flowsheet Documentation  Taken 3/5/2023 2045 by Dionne Patterson RN  Medication Review/Management: medications reviewed     Problem: Hypertension Comorbidity  Goal: Blood Pressure in Desired Range  Outcome: Ongoing, Progressing  Intervention: Maintain Blood Pressure Management  Recent Flowsheet Documentation  Taken 3/5/2023 2045 by Dionne Patterson RN  Medication Review/Management: medications reviewed     Problem: Osteoarthritis Comorbidity  Goal: Maintenance of Osteoarthritis Symptom Control  Outcome: Ongoing, Progressing  Intervention: Maintain Osteoarthritis Symptom Control  Recent Flowsheet Documentation  Taken 3/5/2023 2045 by Dionne Patterson RN  Medication Review/Management: medications reviewed     Problem: Pain Chronic (Persistent) (Comorbidity Management)  Goal: Acceptable Pain Control and Functional Ability  Outcome: Ongoing, Progressing  Intervention: Manage Persistent Pain  Recent Flowsheet Documentation  Taken 3/5/2023 2045 by Dionne Patterson RN  Medication Review/Management: medications  reviewed  Intervention: Optimize Psychosocial Wellbeing  Recent Flowsheet Documentation  Taken 3/5/2023 2045 by Dionne Patterson RN  Diversional Activities: television

## 2023-03-07 LAB
BACTERIA UR QL AUTO: ABNORMAL /HPF
BILIRUB UR QL STRIP: NEGATIVE
CLARITY UR: ABNORMAL
COLOR UR: YELLOW
GLUCOSE UR STRIP-MCNC: NEGATIVE MG/DL
HGB UR QL STRIP.AUTO: ABNORMAL
HYALINE CASTS UR QL AUTO: ABNORMAL /LPF
KETONES UR QL STRIP: ABNORMAL
LEUKOCYTE ESTERASE UR QL STRIP.AUTO: ABNORMAL
MUCOUS THREADS URNS QL MICRO: ABNORMAL /HPF
NITRITE UR QL STRIP: NEGATIVE
PH UR STRIP.AUTO: 5.5 [PH] (ref 5–8)
PROT UR QL STRIP: ABNORMAL
RBC # UR STRIP: ABNORMAL /HPF
REF LAB TEST METHOD: ABNORMAL
SP GR UR STRIP: 1.02 (ref 1–1.03)
SQUAMOUS #/AREA URNS HPF: ABNORMAL /HPF
TRI-PHOS CRY URNS QL MICRO: ABNORMAL /HPF
UROBILINOGEN UR QL STRIP: ABNORMAL
WBC # UR STRIP: ABNORMAL /HPF

## 2023-03-07 PROCEDURE — 97110 THERAPEUTIC EXERCISES: CPT

## 2023-03-07 PROCEDURE — 94664 DEMO&/EVAL PT USE INHALER: CPT

## 2023-03-07 PROCEDURE — 97530 THERAPEUTIC ACTIVITIES: CPT

## 2023-03-07 PROCEDURE — 63710000001 PREDNISONE PER 5 MG: Performed by: INTERNAL MEDICINE

## 2023-03-07 PROCEDURE — 94799 UNLISTED PULMONARY SVC/PX: CPT

## 2023-03-07 PROCEDURE — 99232 SBSQ HOSP IP/OBS MODERATE 35: CPT | Performed by: INTERNAL MEDICINE

## 2023-03-07 PROCEDURE — 81001 URINALYSIS AUTO W/SCOPE: CPT | Performed by: INTERNAL MEDICINE

## 2023-03-07 RX ORDER — CASTOR OIL AND BALSAM, PERU 788; 87 MG/G; MG/G
1 OINTMENT TOPICAL EVERY 12 HOURS SCHEDULED
Status: DISCONTINUED | OUTPATIENT
Start: 2023-03-07 | End: 2023-03-09 | Stop reason: HOSPADM

## 2023-03-07 RX ADMIN — BUDESONIDE AND FORMOTEROL FUMARATE DIHYDRATE 2 PUFF: 160; 4.5 AEROSOL RESPIRATORY (INHALATION) at 09:36

## 2023-03-07 RX ADMIN — ACETAMINOPHEN 325MG 650 MG: 325 TABLET ORAL at 17:24

## 2023-03-07 RX ADMIN — LOPERAMIDE HYDROCHLORIDE 2 MG: 2 CAPSULE ORAL at 08:34

## 2023-03-07 RX ADMIN — PREDNISONE 10 MG: 10 TABLET ORAL at 08:34

## 2023-03-07 RX ADMIN — ATORVASTATIN CALCIUM 20 MG: 20 TABLET, FILM COATED ORAL at 20:29

## 2023-03-07 RX ADMIN — LOPERAMIDE HYDROCHLORIDE 2 MG: 2 CAPSULE ORAL at 20:56

## 2023-03-07 RX ADMIN — Medication 1 APPLICATION: at 08:33

## 2023-03-07 RX ADMIN — BUDESONIDE AND FORMOTEROL FUMARATE DIHYDRATE 2 PUFF: 160; 4.5 AEROSOL RESPIRATORY (INHALATION) at 21:08

## 2023-03-07 RX ADMIN — BISOPROLOL FUMARATE 10 MG: 5 TABLET, FILM COATED ORAL at 08:34

## 2023-03-07 RX ADMIN — Medication 10 ML: at 08:33

## 2023-03-07 RX ADMIN — Medication 1 APPLICATION: at 20:29

## 2023-03-07 RX ADMIN — Medication 10 ML: at 20:29

## 2023-03-07 RX ADMIN — APIXABAN 5 MG: 5 TABLET, FILM COATED ORAL at 20:29

## 2023-03-07 RX ADMIN — APIXABAN 5 MG: 5 TABLET, FILM COATED ORAL at 08:34

## 2023-03-07 NOTE — PROGRESS NOTES
[facility] Progress Note    Admission Date: 3/1/2023    Sol Lovelace  1940  [id number]    Date: 3/7/2023    Antibiotics:  Anti-Infectives (From admission, onward)    Ordered     Dose/Rate Route Frequency Start Stop    03/03/23 1437  cefTRIAXone (ROCEPHIN) 2 g/100 mL 0.9% NS IVPB (MBP)        Ordering Provider: Selene Jefferson MD    2 g  over 30 Minutes Intravenous Every 24 Hours Scheduled 03/03/23 2200 03/05/23 2133    03/01/23 2250  vancomycin IVPB 2000 mg in 0.9% Sodium Chloride (premix) 500 mL        Ordering Provider: Josafat Lujan PharmD    20 mg/kg × 97.1 kg  over 120 Minutes Intravenous Once 03/01/23 2345 03/02/23 0204        Reason for Consultation: uti, perianal wounds     History of present illness:    Patient is a 82 y.o. female with multiple medical problems including Rheumatoid arthritis, chronic back pain, immobility and urinary retention requiring gerber catheterization. She was referred for admission from the Urology clinic after missing multiple appointments for catheter exchange and discussion of suprapubic catheter placement. At the clinic she was encephalopathic, had purulent urine and had multiple wounds on her perineum and gluteal area. Evidently she has chronic diarrhea and incontinence. She was referred for admission. WBC 12.57, procalcitonin 0.33, UA with TNTC WBC. Urine and blood cultures are pending. She was started on ceftriaxone. She has a history of an Ecoli uti 6 weeks ago with a sensitive Ecoli treated with cipro.  No family members are at the bedside and she is unable to provide additional history Per RN she does not appear to have severe diarrhea    3/3/23 Afebrile, alert, no new c/o  States that she is able to eat w/o problems  According to RN she is much more cooperative today   Urine discarded by lab because of multiple moira despite UA with TNTC WBC Stool sent yesterday for GI panel and cdiff - negative    3/7/23 Afebrile, alert, diarrhea improved She completed ceftriaxone  3/5. Repeat UA 3/3 with TNTC wbc but although culture was requested it was not performed She is requesting placement for rehab       Full 12 point review of systems reviewed and negative otherwise for acute complaints,       PE:  Vital Signs  Temp  Min: 95.5 °F (35.3 °C)  Max: 97.1 °F (36.2 °C)  BP  Min: 108/56  Max: 134/89  Pulse  Min: 69  Max: 89  Resp  Min: 16  Max: 18  SpO2  Min: 91 %  Max: 97 %  GENERAL: alert, cooperative  HEENT: Normocephalic, atraumatic.EOMI. No conjunctival injection. No icterus. Oropharynx poorly visualized.     NECK: Supple without nuchal rigidity. No masses.  LYMPH: No cervical lymphadenopathy.  HEART: RRR; No murmur, rubs, gallops.   LUNGS: No wheezing, rales, rhonchi. Poor respiratory effort. Nonlabored.   ABDOMEN: Soft, nontender, nondistended. Positive bowel sounds. No rebound or guarding.  EXT:  trace- 1+ edema  :  With Ayala catheter. Pictures provided by the CN showed multiple wounds with full-thickness skin loss at her left ischial tuberosity and bilateral medial gluteals. The left ischial wound could not be fully probed because of abundant fibrous slough  MSK: No joint effusions or erythema; multiple small ecchymoses    NEURO: Oriented to person and place.  Motor strength not assessed because of diffuse pain    Laboratory Data    Results from last 7 days   Lab Units 03/04/23  0407 03/03/23  0424 03/02/23  0250   WBC 10*3/mm3 7.38 7.32 11.58*   HEMOGLOBIN g/dL 10.6* 9.8* 12.5   HEMATOCRIT % 33.4* 31.4* 39.4   PLATELETS 10*3/mm3 200 163 153     Results from last 7 days   Lab Units 03/05/23  0603   SODIUM mmol/L 138   POTASSIUM mmol/L 4.1   CHLORIDE mmol/L 105   CO2 mmol/L 23.0   BUN mg/dL 10   CREATININE mg/dL 0.25*   GLUCOSE mg/dL 67   CALCIUM mg/dL 7.7*     Results from last 7 days   Lab Units 03/01/23  2105   ALK PHOS U/L 69   BILIRUBIN mg/dL 0.9   ALT (SGPT) U/L 15   AST (SGOT) U/L 25               Estimated Creatinine Clearance: 199.2 mL/min (A) (by C-G formula based on  SCr of 0.25 mg/dL (L)).      Microbiology:  Urine discarded as above; repeat culture sent    Radiology:  Imaging Results (Last 72 Hours)     ** No results found for the last 72 hours. **          I personally reviewed the radiographic studies   Impression:      -- Complicated uti in patient who requires chronic gerber catheter Based on prior cultures she does not appear to be at high risk for MDR pathogens     -- Multiple perineal and gluteal wounds which are unstageable and resulting from chronic incontinence and pressure      -- Urinary retention requiring chronic catheterization     -- Chronic pain that precludes cooperation with wound care, mobilization etc     -- Diarrhea, severity unclear and may be difficult to evaluate in the presence of fecal incontinence  Per RN she has not produced a specimen that can be submitted for studies such as cdiff     -- Chronic steroid use     -- Prior diagnosis of Rheumatoid Arthritis, data deficit     --BMI 37.9 which complicates management of diarrhea and skin care        PLAN/RECOMMENDATIONS:   Thank you for asking us to see Sol Lovelace, I recommend the following:      -- Repeat UA  If she has persistent pyuria I will attempt to have the lab do a culture    -- stool studies ordered- negative except qualitative + cdiff       Discussed with RN        Raquel Anderson MD  3/7/2023

## 2023-03-07 NOTE — PLAN OF CARE
Goal Outcome Evaluation:  Pt's VSS, RA, no tele. Wound care provided and Pt turned frequently. Continue POC.     Problem: Adult Inpatient Plan of Care  Goal: Plan of Care Review  Outcome: Ongoing, Progressing  Goal: Patient-Specific Goal (Individualized)  Outcome: Ongoing, Progressing  Goal: Absence of Hospital-Acquired Illness or Injury  Outcome: Ongoing, Progressing  Intervention: Identify and Manage Fall Risk  Recent Flowsheet Documentation  Taken 3/7/2023 0600 by Dionne Patterson RN  Safety Promotion/Fall Prevention: safety round/check completed  Taken 3/7/2023 0400 by Dionne Patterson RN  Safety Promotion/Fall Prevention: safety round/check completed  Taken 3/7/2023 0200 by Dionne Patterson RN  Safety Promotion/Fall Prevention: safety round/check completed  Taken 3/7/2023 0000 by Yesenia, Dionne, RN  Safety Promotion/Fall Prevention: safety round/check completed  Taken 3/6/2023 2200 by Dionne Patterson RN  Safety Promotion/Fall Prevention:   activity supervised   clutter free environment maintained   fall prevention program maintained   room organization consistent   safety round/check completed  Taken 3/6/2023 2000 by Yesenia, Dionne, RN  Safety Promotion/Fall Prevention: safety round/check completed  Intervention: Prevent Skin Injury  Recent Flowsheet Documentation  Taken 3/7/2023 0600 by Dionne Patterson RN  Body Position: turned  Skin Protection:   adhesive use limited   incontinence pads utilized   tubing/devices free from skin contact  Taken 3/7/2023 0400 by Dionne Patterson RN  Body Position:   turned   left  Skin Protection:   adhesive use limited   incontinence pads utilized   tubing/devices free from skin contact  Taken 3/7/2023 0200 by Dionne Patterson RN  Body Position:   turned   right  Skin Protection:   adhesive use limited   incontinence pads utilized   tubing/devices free from skin contact  Taken 3/7/2023 0000 by Dionne Patterson RN  Body Position:  tilted  Skin Protection:   adhesive use limited   incontinence pads utilized   tubing/devices free from skin contact  Taken 3/6/2023 2200 by Dionne Patterson RN  Body Position:   weight shifting   turned   left  Skin Protection:   adhesive use limited   incontinence pads utilized   tubing/devices free from skin contact  Taken 3/6/2023 2000 by Dionne Patterson RN  Body Position: supine  Skin Protection:   adhesive use limited   incontinence pads utilized   tubing/devices free from skin contact  Intervention: Prevent and Manage VTE (Venous Thromboembolism) Risk  Recent Flowsheet Documentation  Taken 3/6/2023 2200 by Dionne Patterson RN  VTE Prevention/Management: (eliquis) other (see comments)  Intervention: Prevent Infection  Recent Flowsheet Documentation  Taken 3/7/2023 0600 by Dionne Patterson RN  Infection Prevention:   cohorting utilized   rest/sleep promoted   single patient room provided  Taken 3/7/2023 0400 by Dionne Patterson RN  Infection Prevention:   cohorting utilized   rest/sleep promoted   single patient room provided  Taken 3/7/2023 0200 by Dionne Patterson RN  Infection Prevention:   cohorting utilized   rest/sleep promoted   single patient room provided  Taken 3/7/2023 0000 by Dionne Patterson RN  Infection Prevention:   cohorting utilized   rest/sleep promoted   single patient room provided  Taken 3/6/2023 2200 by Dionne Patterson RN  Infection Prevention:   cohorting utilized   rest/sleep promoted   single patient room provided  Taken 3/6/2023 2000 by Dionne Patterson RN  Infection Prevention:   cohorting utilized   rest/sleep promoted   single patient room provided  Goal: Optimal Comfort and Wellbeing  Outcome: Ongoing, Progressing  Intervention: Provide Person-Centered Care  Recent Flowsheet Documentation  Taken 3/6/2023 2200 by Dionne Patterson RN  Trust Relationship/Rapport:   care explained   questions answered   questions encouraged   thoughts/feelings  acknowledged  Goal: Readiness for Transition of Care  Outcome: Ongoing, Progressing     Problem: Skin Injury Risk Increased  Goal: Skin Health and Integrity  Outcome: Ongoing, Progressing  Intervention: Optimize Skin Protection  Recent Flowsheet Documentation  Taken 3/7/2023 0600 by Dionne Patterson RN  Pressure Reduction Techniques:   frequent weight shift encouraged   heels elevated off bed   positioned off wounds   pressure points protected   weight shift assistance provided  Head of Bed (HOB) Positioning: HOB at 20-30 degrees  Pressure Reduction Devices:   specialty bed utilized   positioning supports utilized   heel offloading device utilized  Skin Protection:   adhesive use limited   incontinence pads utilized   tubing/devices free from skin contact  Taken 3/7/2023 0400 by Dionne Patterson RN  Pressure Reduction Techniques:   frequent weight shift encouraged   heels elevated off bed   positioned off wounds   pressure points protected   weight shift assistance provided  Head of Bed (HOB) Positioning: HOB at 20-30 degrees  Pressure Reduction Devices:   positioning supports utilized   heel offloading device utilized   specialty bed utilized  Skin Protection:   adhesive use limited   incontinence pads utilized   tubing/devices free from skin contact  Taken 3/7/2023 0200 by Dionne Patterson RN  Pressure Reduction Techniques:   frequent weight shift encouraged   heels elevated off bed   positioned off wounds   pressure points protected   weight shift assistance provided  Head of Bed (HOB) Positioning: HOB at 20-30 degrees  Pressure Reduction Devices:   heel offloading device utilized   positioning supports utilized   specialty bed utilized  Skin Protection:   adhesive use limited   incontinence pads utilized   tubing/devices free from skin contact  Taken 3/7/2023 0000 by Dionne Patterson RN  Pressure Reduction Techniques:   frequent weight shift encouraged   positioned off wounds   pressure points  protected   heels elevated off bed   weight shift assistance provided  Head of Bed (HOB) Positioning: HOB at 20 degrees  Pressure Reduction Devices:   positioning supports utilized   specialty bed utilized   heel offloading device utilized  Skin Protection:   adhesive use limited   incontinence pads utilized   tubing/devices free from skin contact  Taken 3/6/2023 2200 by Dionne Patterson RN  Pressure Reduction Techniques:   frequent weight shift encouraged   heels elevated off bed   positioned off wounds   pressure points protected   weight shift assistance provided  Head of Bed (HOB) Positioning: HOB at 20-30 degrees  Pressure Reduction Devices:   specialty bed utilized   positioning supports utilized   heel offloading device utilized  Skin Protection:   adhesive use limited   incontinence pads utilized   tubing/devices free from skin contact  Taken 3/6/2023 2000 by Dionne Patterson RN  Pressure Reduction Techniques:   frequent weight shift encouraged   heels elevated off bed   positioned off wounds   pressure points protected   weight shift assistance provided  Head of Bed (HOB) Positioning: HOB at 30-45 degrees  Pressure Reduction Devices:   heel offloading device utilized   positioning supports utilized   specialty bed utilized  Skin Protection:   adhesive use limited   incontinence pads utilized   tubing/devices free from skin contact  Goal: Skin Health and Integrity  Outcome: Ongoing, Progressing  Intervention: Optimize Skin Protection  Recent Flowsheet Documentation  Taken 3/7/2023 0600 by Dionne Patterson RN  Pressure Reduction Techniques:   frequent weight shift encouraged   heels elevated off bed   positioned off wounds   pressure points protected   weight shift assistance provided  Head of Bed (HOB) Positioning: HOB at 20-30 degrees  Pressure Reduction Devices:   specialty bed utilized   positioning supports utilized   heel offloading device utilized  Skin Protection:   adhesive use limited    incontinence pads utilized   tubing/devices free from skin contact  Taken 3/7/2023 0400 by Dionne Patterson RN  Pressure Reduction Techniques:   frequent weight shift encouraged   heels elevated off bed   positioned off wounds   pressure points protected   weight shift assistance provided  Head of Bed (HOB) Positioning: HOB at 20-30 degrees  Pressure Reduction Devices:   positioning supports utilized   heel offloading device utilized   specialty bed utilized  Skin Protection:   adhesive use limited   incontinence pads utilized   tubing/devices free from skin contact  Taken 3/7/2023 0200 by Dionne Patterson RN  Pressure Reduction Techniques:   frequent weight shift encouraged   heels elevated off bed   positioned off wounds   pressure points protected   weight shift assistance provided  Head of Bed (HOB) Positioning: HOB at 20-30 degrees  Pressure Reduction Devices:   heel offloading device utilized   positioning supports utilized   specialty bed utilized  Skin Protection:   adhesive use limited   incontinence pads utilized   tubing/devices free from skin contact  Taken 3/7/2023 0000 by Dionne Patterson RN  Pressure Reduction Techniques:   frequent weight shift encouraged   positioned off wounds   pressure points protected   heels elevated off bed   weight shift assistance provided  Head of Bed (HOB) Positioning: HOB at 20 degrees  Pressure Reduction Devices:   positioning supports utilized   specialty bed utilized   heel offloading device utilized  Skin Protection:   adhesive use limited   incontinence pads utilized   tubing/devices free from skin contact  Taken 3/6/2023 2200 by Dionne Patterson RN  Pressure Reduction Techniques:   frequent weight shift encouraged   heels elevated off bed   positioned off wounds   pressure points protected   weight shift assistance provided  Head of Bed (HOB) Positioning: HOB at 20-30 degrees  Pressure Reduction Devices:   specialty bed utilized   positioning  supports utilized   heel offloading device utilized  Skin Protection:   adhesive use limited   incontinence pads utilized   tubing/devices free from skin contact  Taken 3/6/2023 2000 by Dionne Patterson RN  Pressure Reduction Techniques:   frequent weight shift encouraged   heels elevated off bed   positioned off wounds   pressure points protected   weight shift assistance provided  Head of Bed (HOB) Positioning: HOB at 30-45 degrees  Pressure Reduction Devices:   heel offloading device utilized   positioning supports utilized   specialty bed utilized  Skin Protection:   adhesive use limited   incontinence pads utilized   tubing/devices free from skin contact     Problem: Fall Injury Risk  Goal: Absence of Fall and Fall-Related Injury  Outcome: Ongoing, Progressing  Intervention: Identify and Manage Contributors  Recent Flowsheet Documentation  Taken 3/6/2023 2200 by Dionne Patterson RN  Medication Review/Management: medications reviewed  Intervention: Promote Injury-Free Environment  Recent Flowsheet Documentation  Taken 3/7/2023 0600 by Dionne Patterson RN  Safety Promotion/Fall Prevention: safety round/check completed  Taken 3/7/2023 0400 by Dionne Patterson RN  Safety Promotion/Fall Prevention: safety round/check completed  Taken 3/7/2023 0200 by Dionne Patterson RN  Safety Promotion/Fall Prevention: safety round/check completed  Taken 3/7/2023 0000 by Yesenia, Dionne, RN  Safety Promotion/Fall Prevention: safety round/check completed  Taken 3/6/2023 2200 by Dionne Patterson RN  Safety Promotion/Fall Prevention:   activity supervised   clutter free environment maintained   fall prevention program maintained   room organization consistent   safety round/check completed  Taken 3/6/2023 2000 by Yesenia, Dionne, RN  Safety Promotion/Fall Prevention: safety round/check completed     Problem: UTI (Urinary Tract Infection)  Goal: Improved Infection Symptoms  Outcome: Ongoing,  Progressing     Problem: Asthma Comorbidity  Goal: Maintenance of Asthma Control  Outcome: Ongoing, Progressing  Intervention: Maintain Asthma Symptom Control  Recent Flowsheet Documentation  Taken 3/6/2023 2200 by Dionne Patterson RN  Medication Review/Management: medications reviewed     Problem: Hypertension Comorbidity  Goal: Blood Pressure in Desired Range  Outcome: Ongoing, Progressing  Intervention: Maintain Blood Pressure Management  Recent Flowsheet Documentation  Taken 3/6/2023 2200 by Dionne Patterson RN  Medication Review/Management: medications reviewed     Problem: Osteoarthritis Comorbidity  Goal: Maintenance of Osteoarthritis Symptom Control  Outcome: Ongoing, Progressing  Intervention: Maintain Osteoarthritis Symptom Control  Recent Flowsheet Documentation  Taken 3/6/2023 2200 by Dionne Patterson RN  Medication Review/Management: medications reviewed     Problem: Pain Chronic (Persistent) (Comorbidity Management)  Goal: Acceptable Pain Control and Functional Ability  Outcome: Ongoing, Progressing  Intervention: Manage Persistent Pain  Recent Flowsheet Documentation  Taken 3/6/2023 2200 by Dionne Patterson RN  Medication Review/Management: medications reviewed  Intervention: Optimize Psychosocial Wellbeing  Recent Flowsheet Documentation  Taken 3/6/2023 2200 by Dionne Patterson RN  Diversional Activities: television  Taken 3/6/2023 2000 by Dionne Patterson RN  Diversional Activities: television     Problem: Diarrhea  Goal: Fluid and Electrolyte Balance  Outcome: Ongoing, Progressing  Intervention: Manage Diarrhea  Recent Flowsheet Documentation  Taken 3/7/2023 0600 by Dionne Patterson RN  Isolation Precautions:   precautions maintained   contact  Taken 3/7/2023 0400 by Dionne Patterson RN  Isolation Precautions:   precautions maintained   contact  Taken 3/7/2023 0200 by Dionne Patterson RN  Isolation Precautions:   precautions maintained   contact  Taken 3/7/2023  0000 by Dionne Patterson RN  Isolation Precautions:   precautions maintained   contact  Taken 3/6/2023 2200 by Dionne Patterson RN  Perineal Care:   perineum cleansed   catheter care provided  Medication Review/Management: medications reviewed  Isolation Precautions:   precautions maintained   contact  Taken 3/6/2023 2000 by Dionne Patterson RN  Isolation Precautions:   precautions maintained   contact

## 2023-03-07 NOTE — PLAN OF CARE
Goal Outcome Evaluation:  Plan of Care Reviewed With: patient        Progress: improving  Outcome Evaluation: Patient continues to be limited by weakness and decreased activity tolerance. She gave good effort towards activities for core activation to facilitate trunk control but fatigues quickly. Continue skilled IP PT warranted to address deficits and support return to baseline.

## 2023-03-07 NOTE — PROGRESS NOTES
"    [facility] Medicine Services  PROGRESS NOTE    Patient Name: Sol Lovelace  : 1940  MRN: [id number]    Date of Admission: 3/1/2023  Primary Care Physician: Young Huynh MD    Subjective   Subjective     CC:  diarrhea    HPI:  Stool much more formed per nursing, diarrhea - in effect- stopped  Needing wound care assistance  Family decision for home w hospice > long-term care    Patient reports having some \"young people here I was anxious about\" last night but then realizes this couldn't have happened and correctly answers all orientation questions and reports this was likely a dream    ROS:  Gen- No fevers, chills  CV- No chest pain, palpitations  Resp- No cough, dyspnea    Objective   Objective     Vital Signs:   Temp:  [95.5 °F (35.3 °C)-97.1 °F (36.2 °C)] 97 °F (36.1 °C)  Heart Rate:  [69-89] 74  Resp:  [16-20] 20  BP: (108-122)/(56-87) 120/81     Physical Exam:  Constitutional: Chronically ill appearing female sitting up in recliner  HENT: NCAT, mucous membranes moist  Respiratory: Clear to auscultation bilaterally, respiratory effort normal   Cardiovascular: RRR, no murmurs, rubs, or gallops  Gastrointestinal: Soft, nontender, nondistended  Musculoskeletal: Muscle tone decreased throughout, no joint effusions appreciated  Psychiatric: Flat affect, teary at times, cooperative  Neurologic: Alert and oriented, facial movements symmetric and spontaneous movement of all 4 extremities grossly equal bilaterally, speech clear  Skin: Easy bruising superficially, doughy skin, wounds not examined today  : gerber in place w light yellow urine draining    Results Reviewed:  LAB RESULTS:      Lab 23  0407 23  0424 23  1937 23  1229 23  0703 23  0250 23  0012 236 23   WBC 7.38 7.32  --   --   --  11.58*  --   --  12.57*   HEMOGLOBIN 10.6* 9.8*  --   --   --  12.5  --   --  12.9   HEMATOCRIT 33.4* 31.4*  --   --   --  39.4  " --   --  41.5   PLATELETS 200 163  --   --   --  153  --   --  164   NEUTROS ABS  --   --   --   --   --  9.31*  --   --  10.47*   IMMATURE GRANS (ABS)  --   --   --   --   --  0.08*  --   --  0.08*   LYMPHS ABS  --   --   --   --   --  1.52  --   --  1.44   MONOS ABS  --   --   --   --   --  0.52  --   --  0.50   EOS ABS  --   --   --   --   --  0.11  --   --  0.03   MCV 89.5 89.0  --   --   --  92.5  --   --  91.4   PROCALCITONIN  --   --   --   --   --   --   --   --  0.33*   LACTATE  --   --  1.5 2.2* 3.3* 3.1* 2.9*   < >  --     < > = values in this interval not displayed.         Lab 03/05/23  0603 03/04/23  0407 03/03/23  0424 03/02/23  1229 03/02/23  0250 03/02/23  0012 03/01/23  2106 03/01/23 2105   SODIUM 138 136 133*  --  132*  --   --  133*   POTASSIUM 4.1 3.4* 3.8 4.1 3.1*  --   --  2.9*   CHLORIDE 105 101 102  --  97*  --   --  96*   CO2 23.0 24.0 24.0  --  20.0*  --   --  22.0   ANION GAP 10.0 11.0 7.0  --  15.0  --   --  15.0   BUN 10 7* 10  --  18  --   --  18   CREATININE 0.25* 0.25* 0.26*  --  0.32*  --   --  0.53*   EGFR 112.2 112.2 111.2  --  105.7  --   --  93.6   GLUCOSE 67 65 72  --  93  --   --  91   CALCIUM 7.7* 7.4* 7.6*  --  7.5*  --   --  8.2*   MAGNESIUM  --   --   --   --  1.4* 1.6  --  1.9   HEMOGLOBIN A1C  --   --   --   --   --   --  5.70*  --    TSH  --   --   --   --   --   --   --  5.390*         Lab 03/01/23 2105   TOTAL PROTEIN 5.3*   ALBUMIN 2.5*   GLOBULIN 2.8   ALT (SGPT) 15   AST (SGOT) 25   BILIRUBIN 0.9   ALK PHOS 69         Lab 03/02/23  0012 03/01/23 2105   HSTROP T 48* 67*                 Brief Urine Lab Results  (Last result in the past 365 days)      Color   Clarity   Blood   Leuk Est   Nitrite   Protein   CREAT   Urine HCG        03/07/23 0945 Yellow   Turbid   Moderate (2+)   Small (1+)   Negative   30 mg/dL (1+)                 Microbiology Results Abnormal     Procedure Component Value - Date/Time    Blood Culture - Blood, Arm, Left [[id number]]  (Normal)  Collected: 03/01/23 2105    Lab Status: Final result Specimen: Blood from Arm, Left Updated: 03/06/23 2131     Blood Culture No growth at 5 days    Narrative:      Aerobic bottle only      Blood Culture - Blood, Hand, Left [[id number]]  (Normal) Collected: 03/01/23 2105    Lab Status: Final result Specimen: Blood from Hand, Left Updated: 03/06/23 2131     Blood Culture No growth at 5 days    Narrative:      Aerobic bottle only      Urine Culture - Urine, Indwelling Urethral Catheter [[id number]]  (Normal) Collected: 03/03/23 1559    Lab Status: Final result Specimen: Urine from Indwelling Urethral Catheter Updated: 03/04/23 2033     Urine Culture No growth    Clostridioides difficile toxin Ag, Reflex - Stool, Per Rectum [[id number]]  (Normal) Collected: 03/02/23 1828    Lab Status: Final result Specimen: Stool from Per Rectum Updated: 03/02/23 2106     C.diff Toxin Ag Negative    Narrative:      DNA from a toxigenic strain of C.difficile was detected, although the free toxin itself was not detected. These findings are consistent with C.difficile colonization and may not reflect actual C.difficile infection. Clinical correlation needed.    Gastrointestinal Panel, PCR - Stool, Per Rectum [[id number]]  (Normal) Collected: 03/02/23 1828    Lab Status: Final result Specimen: Stool from Per Rectum Updated: 03/02/23 2030     Campylobacter Not Detected     Plesiomonas shigelloides Not Detected     Salmonella Not Detected     Vibrio Not Detected     Vibrio cholerae Not Detected     Yersinia enterocolitica Not Detected     Enteroaggregative E. coli (EAEC) Not Detected     Enteropathogenic E. coli (EPEC) Not Detected     Enterotoxigenic E. coli (ETEC) lt/st Not Detected     Shiga-like toxin-producing E. coli (STEC) stx1/stx2 Not Detected     Shigella/Enteroinvasive E. coli (EIEC) Not Detected     Cryptosporidium Not Detected     Cyclospora cayetanensis Not Detected     Entamoeba histolytica Not Detected     Giardia lamblia Not  Detected     Adenovirus F40/41 Not Detected     Astrovirus Not Detected     Norovirus GI/GII Not Detected     Rotavirus A Not Detected     Sapovirus (I, II, IV or V) Not Detected          No radiology results from the last 24 hrs    Results for orders placed during the hospital encounter of 10/18/22    Adult Transthoracic Echo Complete W/ Cont if Necessary Per Protocol    Interpretation Summary  •  Estimated left ventricular EF = 55% Left ventricular systolic function is normal.  •  Estimated right ventricular systolic pressure from tricuspid regurgitation is normal (<35 mmHg). Calculated right ventricular systolic pressure from tricuspid regurgitation is 28 mmHg.      Current medications:  Scheduled Meds:apixaban, 5 mg, Oral, Q12H  atorvastatin, 20 mg, Oral, Nightly  bisoprolol, 10 mg, Oral, Q24H  budesonide-formoterol, 2 puff, Inhalation, BID - RT  predniSONE, 10 mg, Oral, Daily With Breakfast  sodium chloride, 10 mL, Intravenous, Q12H  zinc oxide, 1 application, Topical, Q12H      Continuous Infusions:Pharmacy Consult,       PRN Meds:.•  acetaminophen  •  albuterol  •  Calcium Replacement - Initiate Nurse / BPA Driven Protocol  •  HYDROcodone-acetaminophen  •  loperamide  •  Magnesium Standard Dose Replacement - Initiate Nurse / BPA Driven Protocol  •  Pharmacy Consult  •  Phosphorus Replacement - Initiate Nurse / BPA Driven Protocol  •  Potassium Replacement - Initiate Nurse / BPA Driven Protocol  •  sodium chloride  •  sodium chloride    Assessment & Plan   Assessment & Plan     Active Hospital Problems    Diagnosis  POA   • **Diarrhea [R19.7]  Yes   • Buttock wound [S31.809A]  Yes   • Chronic indwelling Ayala catheter [Z97.8]  Not Applicable   • Current chronic use of systemic steroids [Z79.52]  Not Applicable   • 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        Brief Hospital Course to date:  Sol Lovelace is a 82 y.o. female w adrenal insufficiency 2/2 chronic steroid use, debility and bedbound status, urinary retention requiring gerber who presents w profuse diarrhea x days. Found to have sacral wounds on examination.    Severe sepsis 2/2 diarrheal illness w lactic acidosis - resolved  - GI PCR negative, C diff toxin positive, Atg negative not active Cdiff  - spore precautions per infection control  - OK for imodium PRN    MASD, incontinence associated skin damage  -no s/s of fistula or deeper wound, wound nursing to see for f/u     Chronic steroids use w adrenal insufficiency  -chronic prednisone 10 mg daily. H/o hyponatremia in past off steroids  -maintaining glucose, d/c checks. Some lower temps charted but appears controlled otherwise    Pyuria in setting of chronic Gerber - s/p 5 days ceftriaxone, suspect E coli colonization in retrospect after dx review    Afib -rate controled, cont eliquis and bisoprolol for rate control  Relative hypotension in setting of chronic HTN, improved - hold nifedipine  Asthma -cont advair  HLD -cont lipitor  Chronic debility, BMI 37 - complicates daily care  Hypokalemia, likely 2/2 diarrhea - replace per protocol  Hypomagnesemia - replace per protocol    Ambulance to home w hospice scheduled for 3/9    Expected Discharge Date and Time     Expected Discharge Date Expected Discharge Time    Mar 9, 2023            DVT prophylaxis:  Medical DVT prophylaxis orders are present.     AM-PAC 6 Clicks Score (PT): 6 (03/06/23 1106)    CODE STATUS:   Code Status and Medical Interventions:   Ordered at: 03/02/23 0926     Medical Intervention Limits:    NO intubation (DNI)     Code Status (Patient has no pulse and is not breathing):    No CPR (Do Not Attempt to Resuscitate)     Medical Interventions (Patient  has pulse or is breathing):    Limited Support     Comments:    no ICU escalation     Release to patient:    Routine Release       Selene Jefferson MD  03/07/23

## 2023-03-07 NOTE — CASE MANAGEMENT/SOCIAL WORK
Continued Stay Note   [facility]     Patient Name: Sol Lovelace  MRN: [id number]  Today's Date: 3/7/2023    Admit Date: 3/1/2023    Plan: Home with Hospice   Discharge Plan     Row Name 03/07/23 1125       Plan    Plan Home with Hospice    Patient/Family in Agreement with Plan yes    Plan Comments Spoke with daughter by phone. We discussed the referral to [facility]. [facility] felt she was more of a LTC placement, in which they don't have a bed. Daughter doesn’t want her in a LTC bed. She wants Home with Hospice now. CM called Hospice to notified. CM will follow.    Final Discharge Disposition Code 50 - home with hospice               Discharge Codes    No documentation.               Expected Discharge Date and Time     Expected Discharge Date Expected Discharge Time    Mar 8, 2023             Michelle Chu RN

## 2023-03-07 NOTE — THERAPY TREATMENT NOTE
Patient Name: Sol Lovelace  : 1940    MRN: [id number]                              Today's Date: 3/7/2023       Admit Date: 3/1/2023    Visit Dx: No diagnosis found.  Patient Active Problem List   Diagnosis   • E. coli UTI   • Hyponatremia   • Elevated liver enzymes   • Hypoalbuminemia   • Leukocytosis   • Proteinuria   • Paroxysmal atrial fibrillation (HCC)   • Essential hypertension   • Acute urinary retention   • Current chronic use of systemic steroids   • Adrenal insufficiency due to corticosteroid withdrawal ([facility])   • Severe obesity (BMI 35.0-35.9 with comorbidity) ([facility])   • Moderate asthma with exacerbation   • Diarrhea   • Buttock wound   • Chronic indwelling Ayala catheter     Past Medical History:   Diagnosis Date   • Asthma    • Chronic back pain    • GERD (gastroesophageal reflux disease)    • Hyperlipidemia    • Hypertension    • Osteoarthritis    • Rheumatoid arthritis ([facility])      Past Surgical History:   Procedure Laterality Date   • LAPAROSCOPIC VAGINAL HYSTERECTOMY      BSO, bladder suspension      General Information     Row Name 23 1055          Physical Therapy Time and Intention    Document Type therapy note (daily note)  -NS     Mode of Treatment individual therapy;physical therapy  -NS     Row Name 23 1054          General Information    Patient Profile Reviewed yes  -NS     Existing Precautions/Restrictions fall;other (see comments)  L/R gluteal pressure injuries and upper back wound, decreased skin integrity  -NS     Row Name 23 1054          Cognition    Orientation Status (Cognition) oriented to;person;place;time  situational confusion  -NS     Row Name 23 1054          Safety Issues, Functional Mobility    Safety Issues Affecting Function (Mobility) insight into deficits/self-awareness;judgment;problem-solving;safety precaution awareness;safety precautions follow-through/compliance;sequencing abilities  -NS     Impairments Affecting Function (Mobility)  balance;cognition;coordination;endurance/activity tolerance;motor planning;postural/trunk control;range of motion (ROM);strength  -NS           User Key  (r) = Recorded By, (t) = Taken By, (c) = Cosigned By    Initials Name Provider Type    Ness Hinson PT Physical Therapist               Mobility     Row Name 03/07/23 1054          Bed Mobility    Bed Mobility rolling left;rolling right  -NS     Rolling Left [place] (Bed Mobility) maximum assist (25% patient effort);2 person assist;verbal cues  -NS     Rolling Right [place] (Bed Mobility) maximum assist (25% patient effort);2 person assist;verbal cues  -NS     Assistive Device (Bed Mobility) bed rails;draw sheet  -NS     Comment, (Bed Mobility) Improved transfer initiation  -NS     Row Name 03/07/23 1054          Transfers    Comment, (Transfers) Lift to chair for safety. Pt declined attempts at STS due to feeling too weak.  -NS     Row Name 03/07/23 1054          Bed-Chair Transfer    Bed-Chair [place] (Transfers) dependent (less than 25% patient effort);2 person assist  -NS     Assistive Device (Bed-Chair Transfers) lift device  -NS           User Key  (r) = Recorded By, (t) = Taken By, (c) = Cosigned By    Initials Name Provider Type    Ness Hinson PT Physical Therapist               Obj/Interventions     Row Name 03/07/23 1054          Motor Skills    Therapeutic Exercise hip;knee;ankle  -[facility] Name 03/07/23 1054          Hip (Therapeutic Exercise)    Hip (Therapeutic Exercise) strengthening exercise;isometric exercises  -NS     Hip Isometrics (Therapeutic Exercise) bilateral;gluteal sets;10 repetitions  -NS     Hip Strengthening (Therapeutic Exercise) bilateral;external rotation;internal rotation  -NS     Row Name 03/07/23 1054          Knee (Therapeutic Exercise)    Knee (Therapeutic Exercise) isometric exercises  -NS     Knee Isometrics (Therapeutic Exercise) bilateral;quad sets;10 repetitions  -NS     Knee Strengthening  (Therapeutic Exercise) bilateral;LAQ (long arc quad);10 repetitions  AAROM on LLE  -NS     Row Name 03/07/23 1054          Ankle (Therapeutic Exercise)    Ankle (Therapeutic Exercise) AROM (active range of motion)  -NS     Ankle AROM (Therapeutic Exercise) bilateral;dorsiflexion;plantarflexion;10 repetitions  -NS     Row Name 03/07/23 1054          Balance    Balance Assessment sitting static balance;sitting dynamic balance  -NS     Static Sitting Balance standby assist  -NS     Dynamic Sitting Balance standby assist  -NS     Position, Sitting Balance unsupported;sitting in chair  -NS     Comment, Balance Patient practiced A/P weight shifting, trunk rotations, cross body reaches, and static unsupported sitting to facilitate core activation for trunk control.  -NS           User Key  (r) = Recorded By, (t) = Taken By, (c) = Cosigned By    Initials Name Provider Type    Ness Hinson, PT Physical Therapist               Goals/Plan    No documentation.                Clinical Impression     Row Name 03/07/23 1054          Pain    Pretreatment Pain Rating 0/10 - no pain  -NS     Posttreatment Pain Rating 0/10 - no pain  -NS     Row Name 03/07/23 1054          Plan of Care Review    Plan of Care Reviewed With patient  -NS     Progress improving  -NS     Outcome Evaluation Patient continues to be limited by weakness and decreased activity tolerance. She gave good effort towards activities for core activation to facilitate trunk control but fatigues quickly. Continue skilled IP PT warranted to address deficits and support return to baseline.  -NS     Row Name 03/07/23 1054          Vital Signs    Pre Systolic BP Rehab --  VSS- RN cleared for PT treatment  -NS     Pre Patient Position Supine  -NS     Intra Patient Position Sitting  -NS     Post Patient Position Sitting  -NS     Row Name 03/07/23 1054          Positioning and Restraints    Pre-Treatment Position in bed  -NS     Post Treatment Position chair  -NS     In  Chair notified nsg;reclined;call light within reach;encouraged to call for assist;exit alarm on;legs elevated;waffle cushion;on mechanical lift sling;heels elevated  -NS           User Key  (r) = Recorded By, (t) = Taken By, (c) = Cosigned By    Initials Name Provider Type    Ness Hinson PT Physical Therapist               Outcome Measures     Row Name 03/07/23 1054          How much help from another person do you currently need...    Turning from your back to your side while in flat bed without using bedrails? 1  -NS     Moving from lying on back to sitting on the side of a flat bed without bedrails? 1  -NS     Moving to and from a bed to a chair (including a wheelchair)? 1  -NS     Standing up from a chair using your arms (e.g., wheelchair, bedside chair)? 1  -NS     Climbing 3-5 steps with a railing? 1  -NS     To walk in hospital room? 1  -NS     AM-PAC 6 Clicks Score (PT) 6  -NS     Highest level of mobility 2 --> Bed activities/dependent transfer  -NS     Row Name 03/07/23 1054          Functional Assessment    Outcome Measure Options AM-PAC 6 Clicks Basic Mobility (PT)  -NS           User Key  (r) = Recorded By, (t) = Taken By, (c) = Cosigned By    Initials Name Provider Type    Ness Hinson PT Physical Therapist                             Physical Therapy Education     Title: PT OT SLP Therapies (In Progress)     Topic: Physical Therapy (Done)     Point: Mobility training (Done)     Learning Progress Summary           Patient Acceptance, E, VU,NR by NS at 3/7/2023 1404    Acceptance, E, VU,NR by NS at 3/6/2023 1334    Acceptance, E, VU by KR at 3/2/2023 0914                   Point: Home exercise program (Done)     Learning Progress Summary           Patient Acceptance, E, VU,NR by NS at 3/7/2023 1404    Acceptance, E, VU,NR by NS at 3/6/2023 1334    Acceptance, E, VU by KR at 3/2/2023 0914                   Point: Body mechanics (Done)     Learning Progress Summary           Patient Acceptance,  E, VU,NR by NS at 3/7/2023 1404    Acceptance, E, VU,NR by NS at 3/6/2023 1334    Acceptance, E, VU by KR at 3/2/2023 0914                   Point: Precautions (Done)     Learning Progress Summary           Patient Acceptance, E, VU,NR by NS at 3/7/2023 1404    Acceptance, E, VU,NR by NS at 3/6/2023 1334    Acceptance, E, VU by KR at 3/2/2023 0914                               User Key     Initials Effective Dates Name Provider Type Discipline    NS 06/16/21 -  Ness Murguia, PT Physical Therapist PT    KR 12/30/22 -  Kristel Albright PT Physical Therapist PT              PT Recommendation and Plan     Plan of Care Reviewed With: patient  Progress: improving  Outcome Evaluation: Patient continues to be limited by weakness and decreased activity tolerance. She gave good effort towards activities for core activation to facilitate trunk control but fatigues quickly. Continue skilled IP PT warranted to address deficits and support return to baseline.     Time Calculation:    PT Charges     Row Name 03/07/23 1054             Time Calculation    Start Time 1054  -NS      PT Received On 03/07/23  -NS      PT Goal Re-Cert Due Date 03/12/23  -NS         Timed Charges    89016 - PT Therapeutic Exercise Minutes 15  -NS      36497 - PT Therapeutic Activity Minutes 24  -NS         Total Minutes    Timed Charges Total Minutes 39  -NS       Total Minutes 39  -NS            User Key  (r) = Recorded By, (t) = Taken By, (c) = Cosigned By    Initials Name Provider Type    Ness Hinson, PT Physical Therapist              Therapy Charges for Today     Code Description Service Date Service Provider Modifiers Qty    10806160475 HC PT THERAPEUTIC ACT EA 15 MIN 3/6/2023 Ness Murguia, PT GP 1    90504148689 HC PT THER PROC EA 15 MIN 3/6/2023 Ness Murguia, PT GP 1    28742284680 HC PT THERAPEUTIC ACT EA 15 MIN 3/7/2023 Ness Murguia, PT GP 2    49564717855 HC PT THER PROC EA 15 MIN 3/7/2023 Ness Murguia, PT GP 1    81378798643 HC PT  THER SUPP EA 15 MIN 3/7/2023 Ness Murguia, PT GP 3          PT G-Codes  Outcome Measure Options: AM-PAC 6 Clicks Basic Mobility (PT)  AM-PAC 6 Clicks Score (PT): 6  AM-PAC 6 Clicks Score (OT): 10       Ness Murguia, PT  3/7/2023

## 2023-03-07 NOTE — PROGRESS NOTES
Continued Stay Note  [facility]     Patient Name: Sol Lovelace  MRN: [id number]  Today's Date: 3/7/2023    Admit Date: 3/1/2023    Plan: home with [facility] hospice   Discharge Plan     Row Name 03/07/23 1604       Plan    Plan home with [facility] hospice    Plan Comments Call made to pt daughter/DAVONStephanie, at [phone].  Hospice plan of care and goals of care discussed.  Questions and concerns addressed.  Overview of hospice services provided.  Pt to dc home ([facility] [address], [place] [id number]) via ambulance Thursday, March 9, at 14:45; will place PCS on chart, daughter plans to come to hospital Thursday morning to sign EMS/DNR.  DME (hospital bed, [facility]) to be delivered Wednesday.  Hospice RN to provide oral care supplies, gloves size L, F/C supplies and zinc-oxide moisture barrier cream--for MASD/IAD with pressure component (wound orders sent to hospice intake).  5-day supply of meds to be sent home via meds-2-beds.  Hospice 24h number given to pt daughter who verbalized understanding to call once pt arrives home to be admitted to hospice services.  Team, RN and CM updated.  Please call [phone] if I can be of further assistance.    Final Discharge Disposition Code 50 - home with hospice               Discharge Codes    No documentation.               Expected Discharge Date and Time     Expected Discharge Date Expected Discharge Time    Mar 9, 2023             Aimee Morse RN

## 2023-03-07 NOTE — NURSING NOTE
[facility] consulted for:  Bilateral gluteal PIs  Pt MASD on buttock/gluteal area does not appear to be improving. At this point in time today pt has had 2 episodes of diarrhea. Please evaluate and advise.  Thanks!    Patient in bed, alert and oriented, agreeable to assessment and RN at bedside to assist with turning.    Identified unstageable pressure injuries to patient's midline thoracic spine, left ischial tuberosity and right gluteal.  Wound beds to left ischial tuberosity and right gluteal covered with slough, yellow/gray, nonblanchable; wound to midline thoracic spine with eschar, slough, black/gray, nonblanchable wound bed.  Periwound skin is red, blanchable and to patient's right gluteal and left ischial tuberosity skin is also hyperpigmented due to incontinence associated dermatitis.  Scant serous/yellow and serosanguineous drainage noted.  Wounds measure:  Left ischial tuberosity: 5 x 5.5 x 1.5 with undermining from 12-5 o'clock greater than 1.5 cm  Right gluteal: 1 x 4 x 0.25 cm  Midline thoracic spine: 0.5 x 1 x 1 cm with undermining circumferentially> than 1 cm    Irrigated and cleansed wounds with normal saline, applied skin protectant to periwound skin, gently and loosely filled wounds with Thera honey sheet cut into ribbon and covered with silicone foam border dressing.    Left ischial tuberosity          Right gluteal    Midline thoracic spine          Identified  incontinence associated dermatitis with full-thickness skin loss to bilateral gluteals full-thickness wounds to right gluteal measure 0.5 cm in diameter x 0.25 cm in depth wound wound ostomy team note, 0.5 x 1 x 0.2 cm wound beds are red/yellow/pink, moist, blanchable.  Right gluteal with partial-thickness skin loss as well.   Left gluteal with scattered full and partial-thickness skin loss, wound beds are red/pink and blanchable with scant sanguinous drainage with cleansing.  Hemostasis achieved.  Cleansed wounds with pH balanced foaming  cleanser and barrier wipes, patted dry, applied light dusting of stoma powder and covered with Desitin 40% zinc barrier cream.            Identified suspected deep tissue pressure injury versus senile purpura tohe patient's right greater trochanter.  Wound bed is dry, purple/maroon, nonblanchable, measures 1 cm in diameter.  Periwound skin is blanchable, no drainage noted.        Identified intertrigo in his dermatitis with partial thickness skin loss to patient's abdominal lateral skin fold, wound bed is dry, red, blanchable with pink blanchable periwound skin.  No drainage noted.  Recommended to bedside RN to apply Dri-go wicking fabric to abdominal skin fold.    Sensory Perception: 2-->very limited  Moisture: 3-->occasionally moist  Activity: 2-->chairfast  Mobility: 2-->very limited  Nutrition: 2-->probably inadequate  Friction and Shear: 2-->potential problem  Ld Score: 13 (03/07/23 0845)  Patient is on a low-air-loss mattress educated RN on the settings to assure that the TEX mattress is inflating properly.    Please implement pressure injury prevention interventions per protocol for patient with a Ld score of 18 or less.    See orders for recommendations.    Thank you for the consult.  WOC team will plan to follow-up.  If alteration to skin integrity or change in wound bed presentation please consult the WOC team.

## 2023-03-08 PROCEDURE — 97530 THERAPEUTIC ACTIVITIES: CPT

## 2023-03-08 PROCEDURE — 97110 THERAPEUTIC EXERCISES: CPT

## 2023-03-08 PROCEDURE — 94799 UNLISTED PULMONARY SVC/PX: CPT

## 2023-03-08 PROCEDURE — 97535 SELF CARE MNGMENT TRAINING: CPT

## 2023-03-08 PROCEDURE — 94761 N-INVAS EAR/PLS OXIMETRY MLT: CPT

## 2023-03-08 PROCEDURE — 99231 SBSQ HOSP IP/OBS SF/LOW 25: CPT | Performed by: INTERNAL MEDICINE

## 2023-03-08 PROCEDURE — 63710000001 PREDNISONE PER 5 MG: Performed by: INTERNAL MEDICINE

## 2023-03-08 RX ADMIN — CASTOR OIL AND BALSAM, PERU 1 APPLICATION: 788; 87 OINTMENT TOPICAL at 20:16

## 2023-03-08 RX ADMIN — Medication 10 ML: at 20:16

## 2023-03-08 RX ADMIN — APIXABAN 5 MG: 5 TABLET, FILM COATED ORAL at 09:16

## 2023-03-08 RX ADMIN — PREDNISONE 10 MG: 10 TABLET ORAL at 09:16

## 2023-03-08 RX ADMIN — CASTOR OIL AND BALSAM, PERU 1 APPLICATION: 788; 87 OINTMENT TOPICAL at 09:15

## 2023-03-08 RX ADMIN — Medication 1 APPLICATION: at 20:16

## 2023-03-08 RX ADMIN — ATORVASTATIN CALCIUM 20 MG: 20 TABLET, FILM COATED ORAL at 20:16

## 2023-03-08 RX ADMIN — BISOPROLOL FUMARATE 10 MG: 5 TABLET, FILM COATED ORAL at 09:16

## 2023-03-08 RX ADMIN — Medication 10 ML: at 09:17

## 2023-03-08 RX ADMIN — Medication 1 APPLICATION: at 09:15

## 2023-03-08 RX ADMIN — BUDESONIDE AND FORMOTEROL FUMARATE DIHYDRATE 2 PUFF: 160; 4.5 AEROSOL RESPIRATORY (INHALATION) at 08:00

## 2023-03-08 RX ADMIN — APIXABAN 5 MG: 5 TABLET, FILM COATED ORAL at 20:16

## 2023-03-08 RX ADMIN — BUDESONIDE AND FORMOTEROL FUMARATE DIHYDRATE 2 PUFF: 160; 4.5 AEROSOL RESPIRATORY (INHALATION) at 21:43

## 2023-03-08 NOTE — PROGRESS NOTES
Continued Stay Note  [facility]     Patient Name: Sol Lovelace  MRN: [id number]  Today's Date: 3/8/2023    Admit Date: 3/1/2023    Plan: Home with [facility]   Discharge Plan     Row Name 03/08/23 1841       Plan    Plan Home with [facility]    Plan Comments Plan remains for pt to discharge home tomorrow, 3/9, with [facility]. Ambulance  at 1445. Spoke with pt's daughter Stephanie regarding hospice, Stephanie stated has no questions or concerns, plans to meet this writer in the morning to sign the EMS/DNR form. Will continue to follow. Please call [phone] if can be of further assistance.               Discharge Codes    No documentation.               Expected Discharge Date and Time     Expected Discharge Date Expected Discharge Time    Mar 9, 2023             Marcy Ramirez RN

## 2023-03-08 NOTE — PROGRESS NOTES
"    [facility] Medicine Services  PROGRESS NOTE    Patient Name: Sol Lovelace  : 1940  MRN: [id number]    Date of Admission: 3/1/2023  Primary Care Physician: Young Huynh MD    Subjective   Subjective     CC:  diarrhea    HPI:  Patient happy to be going home tomorrow although notes that she is \"still having some diarrhea\"    ROS:  Gen- No fevers, chills  CV- No chest pain, palpitations  Resp- No cough, dyspnea  GI- +diarrhea, no nausea/vomiting    Objective   Objective     Vital Signs:   Temp:  [97 °F (36.1 °C)-97.9 °F (36.6 °C)] 97.1 °F (36.2 °C)  Heart Rate:  [66-86] 85  Resp:  [20] 20  BP: (109-126)/(76-94) 125/76     Physical Exam:  Constitutional: No acute distress, awake, alert  HENT: NCAT, mucous membranes moist  Respiratory: Clear to auscultation bilaterally, respiratory effort normal   Cardiovascular: RRR, no murmurs, rubs, or gallops  Gastrointestinal: soft, nontender, nondistended  Musculoskeletal: No bilateral ankle edema  Psychiatric: Appropriate affect, cooperative  Neurologic: Oriented x 3, speech clear  Skin: No rashes      Results Reviewed:  LAB RESULTS:      Lab 23  0407 23  0424 23  1937 23  1229 23  0703 23  0250 23  0012 23  2106 23  2105   WBC 7.38 7.32  --   --   --  11.58*  --   --  12.57*   HEMOGLOBIN 10.6* 9.8*  --   --   --  12.5  --   --  12.9   HEMATOCRIT 33.4* 31.4*  --   --   --  39.4  --   --  41.5   PLATELETS 200 163  --   --   --  153  --   --  164   NEUTROS ABS  --   --   --   --   --  9.31*  --   --  10.47*   IMMATURE GRANS (ABS)  --   --   --   --   --  0.08*  --   --  0.08*   LYMPHS ABS  --   --   --   --   --  1.52  --   --  1.44   MONOS ABS  --   --   --   --   --  0.52  --   --  0.50   EOS ABS  --   --   --   --   --  0.11  --   --  0.03   MCV 89.5 89.0  --   --   --  92.5  --   --  91.4   PROCALCITONIN  --   --   --   --   --   --   --   --  0.33*   LACTATE  --   --  1.5 2.2* 3.3* 3.1* " 2.9*   < >  --     < > = values in this interval not displayed.         Lab 03/05/23  0603 03/04/23  0407 03/03/23  0424 03/02/23  1229 03/02/23  0250 03/02/23  0012 03/01/23 2106 03/01/23 2105   SODIUM 138 136 133*  --  132*  --   --  133*   POTASSIUM 4.1 3.4* 3.8 4.1 3.1*  --   --  2.9*   CHLORIDE 105 101 102  --  97*  --   --  96*   CO2 23.0 24.0 24.0  --  20.0*  --   --  22.0   ANION GAP 10.0 11.0 7.0  --  15.0  --   --  15.0   BUN 10 7* 10  --  18  --   --  18   CREATININE 0.25* 0.25* 0.26*  --  0.32*  --   --  0.53*   EGFR 112.2 112.2 111.2  --  105.7  --   --  93.6   GLUCOSE 67 65 72  --  93  --   --  91   CALCIUM 7.7* 7.4* 7.6*  --  7.5*  --   --  8.2*   MAGNESIUM  --   --   --   --  1.4* 1.6  --  1.9   HEMOGLOBIN A1C  --   --   --   --   --   --  5.70*  --    TSH  --   --   --   --   --   --   --  5.390*         Lab 03/01/23 2105   TOTAL PROTEIN 5.3*   ALBUMIN 2.5*   GLOBULIN 2.8   ALT (SGPT) 15   AST (SGOT) 25   BILIRUBIN 0.9   ALK PHOS 69         Lab 03/02/23  0012 03/01/23 2105   HSTROP T 48* 67*                 Brief Urine Lab Results  (Last result in the past 365 days)      Color   Clarity   Blood   Leuk Est   Nitrite   Protein   CREAT   Urine HCG        03/07/23 0945 Yellow   Turbid   Moderate (2+)   Small (1+)   Negative   30 mg/dL (1+)                 Microbiology Results Abnormal     Procedure Component Value - Date/Time    Blood Culture - Blood, Arm, Left [[id number]]  (Normal) Collected: 03/01/23 2105    Lab Status: Final result Specimen: Blood from Arm, Left Updated: 03/06/23 2131     Blood Culture No growth at 5 days    Narrative:      Aerobic bottle only      Blood Culture - Blood, Hand, Left [[id number]]  (Normal) Collected: 03/01/23 2105    Lab Status: Final result Specimen: Blood from Hand, Left Updated: 03/06/23 2131     Blood Culture No growth at 5 days    Narrative:      Aerobic bottle only      Urine Culture - Urine, Indwelling Urethral Catheter [[id number]]  (Normal) Collected:  03/03/23 1559    Lab Status: Final result Specimen: Urine from Indwelling Urethral Catheter Updated: 03/04/23 2033     Urine Culture No growth    Clostridioides difficile toxin Ag, Reflex - Stool, Per Rectum [[id number]]  (Normal) Collected: 03/02/23 1828    Lab Status: Final result Specimen: Stool from Per Rectum Updated: 03/02/23 2106     C.diff Toxin Ag Negative    Narrative:      DNA from a toxigenic strain of C.difficile was detected, although the free toxin itself was not detected. These findings are consistent with C.difficile colonization and may not reflect actual C.difficile infection. Clinical correlation needed.    Gastrointestinal Panel, PCR - Stool, Per Rectum [[id number]]  (Normal) Collected: 03/02/23 1828    Lab Status: Final result Specimen: Stool from Per Rectum Updated: 03/02/23 2030     Campylobacter Not Detected     Plesiomonas shigelloides Not Detected     Salmonella Not Detected     Vibrio Not Detected     Vibrio cholerae Not Detected     Yersinia enterocolitica Not Detected     Enteroaggregative E. coli (EAEC) Not Detected     Enteropathogenic E. coli (EPEC) Not Detected     Enterotoxigenic E. coli (ETEC) lt/st Not Detected     Shiga-like toxin-producing E. coli (STEC) stx1/stx2 Not Detected     Shigella/Enteroinvasive E. coli (EIEC) Not Detected     Cryptosporidium Not Detected     Cyclospora cayetanensis Not Detected     Entamoeba histolytica Not Detected     Giardia lamblia Not Detected     Adenovirus F40/41 Not Detected     Astrovirus Not Detected     Norovirus GI/GII Not Detected     Rotavirus A Not Detected     Sapovirus (I, II, IV or V) Not Detected          No radiology results from the last 24 hrs    Results for orders placed during the hospital encounter of 10/18/22    Adult Transthoracic Echo Complete W/ Cont if Necessary Per Protocol    Interpretation Summary  •  Estimated left ventricular EF = 55% Left ventricular systolic function is normal.  •  Estimated right ventricular  systolic pressure from tricuspid regurgitation is normal (<35 mmHg). Calculated right ventricular systolic pressure from tricuspid regurgitation is 28 mmHg.      Current medications:  Scheduled Meds:apixaban, 5 mg, Oral, Q12H  atorvastatin, 20 mg, Oral, Nightly  bisoprolol, 10 mg, Oral, Q24H  budesonide-formoterol, 2 puff, Inhalation, BID - RT  castor oil-balsam peru, 1 application, Topical, Q12H  predniSONE, 10 mg, Oral, Daily With Breakfast  sodium chloride, 10 mL, Intravenous, Q12H  zinc oxide, 1 application, Topical, Q12H      Continuous Infusions:Pharmacy Consult,       PRN Meds:.•  acetaminophen  •  albuterol  •  Calcium Replacement - Initiate Nurse / BPA Driven Protocol  •  HYDROcodone-acetaminophen  •  loperamide  •  Magnesium Standard Dose Replacement - Initiate Nurse / BPA Driven Protocol  •  Pharmacy Consult  •  Phosphorus Replacement - Initiate Nurse / BPA Driven Protocol  •  Potassium Replacement - Initiate Nurse / BPA Driven Protocol  •  sodium chloride  •  sodium chloride  •  zinc oxide    Assessment & Plan   Assessment & Plan     Active Hospital Problems    Diagnosis  POA   • **Diarrhea [R19.7]  Yes   • Buttock wound [S31.809A]  Yes   • Chronic indwelling Gerber catheter [Z97.8]  Not Applicable   • Current chronic use of systemic steroids [Z79.52]  Not Applicable   • 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        Brief Hospital Course to date:  Sol Lovelace is a 82 y.o. female w adrenal insufficiency 2/2 chronic steroid use, debility and bedbound status, urinary retention requiring gerber who presents w profuse diarrhea x days. Found to have sacral wounds on examination.    Severe sepsis 2/2 diarrheal illness w  lactic acidosis - resolved  - GI PCR negative, C diff toxin positive, Atg negative not active Cdiff  - spore precautions per infection control  - OK for imodium PRN    MASD, incontinence associated skin damage  -no s/s of fistula or deeper wound, wound nursing to see for f/u     Chronic steroids use w adrenal insufficiency  -chronic prednisone 10 mg daily. H/o hyponatremia in past off steroids  -maintaining glucose, d/c checks. Some lower temps charted but appears controlled otherwise    Pyuria in setting of chronic Ayala - s/p 5 days ceftriaxone, suspect E coli colonization in retrospect after dx review    Afib -rate controled, cont eliquis and bisoprolol for rate control  Relative hypotension in setting of chronic HTN, improved - hold nifedipine  Asthma -cont advair  HLD -cont lipitor  Chronic debility, BMI 37 - complicates daily care  Hypokalemia, likely 2/2 diarrhea - replace per protocol  Hypomagnesemia - replace per protocol    Ambulance to home w hospice scheduled for 3/9 at 2:45    Expected Discharge Date and Time     Expected Discharge Date Expected Discharge Time    Mar 9, 2023            DVT prophylaxis:  Medical DVT prophylaxis orders are present.     AM-PAC 6 Clicks Score (PT): 6 (03/07/23 1054)    CODE STATUS:   Code Status and Medical Interventions:   Ordered at: 03/02/23 0919     Medical Intervention Limits:    NO intubation (DNI)     Code Status (Patient has no pulse and is not breathing):    No CPR (Do Not Attempt to Resuscitate)     Medical Interventions (Patient has pulse or is breathing):    Limited Support     Comments:    no ICU escalation     Release to patient:    Routine Release       Salima Mario DO  03/08/23

## 2023-03-08 NOTE — PLAN OF CARE
Goal Outcome Evaluation:  Plan of Care Reviewed With: patient        Progress: improving  Outcome Evaluation: Pt with good participation in therapy session. Pt demonstrating small improvements in functional activity tolerance and sitting balance. Pt continuing to require MaxAx2 for bed mobility and dependent mechanical lift from bed to chair. Pt continuing with considerable generalized weakness, poor functional activity tolerance, and confusion limiting pt's functional mobility. Pt plans to continue current POC as tolerated.

## 2023-03-08 NOTE — THERAPY TREATMENT NOTE
Patient Name: Sol Lovelace  : 1940    MRN: [id number]                              Today's Date: 3/8/2023       Admit Date: 3/1/2023    Visit Dx: No diagnosis found.  Patient Active Problem List   Diagnosis   • E. coli UTI   • Hyponatremia   • Elevated liver enzymes   • Hypoalbuminemia   • Leukocytosis   • Proteinuria   • Paroxysmal atrial fibrillation (HCC)   • Essential hypertension   • Acute urinary retention   • Current chronic use of systemic steroids   • Adrenal insufficiency due to corticosteroid withdrawal ([facility])   • Severe obesity (BMI 35.0-35.9 with comorbidity) ([facility])   • Moderate asthma with exacerbation   • Diarrhea   • Buttock wound   • Chronic indwelling Ayala catheter     Past Medical History:   Diagnosis Date   • Asthma    • Chronic back pain    • GERD (gastroesophageal reflux disease)    • Hyperlipidemia    • Hypertension    • Osteoarthritis    • Rheumatoid arthritis ([facility])      Past Surgical History:   Procedure Laterality Date   • LAPAROSCOPIC VAGINAL HYSTERECTOMY      BSO, bladder suspension      General Information     Row Name 23 1108          OT Time and Intention    Document Type therapy note (daily note)  -     Mode of Treatment occupational therapy  -     Row Name 23 1104          General Information    Patient Profile Reviewed yes  -PAYTON     Existing Precautions/Restrictions fall;other (see comments)  gluteal and upper back pressure wounds, decreased skin integrity  -PAYTON     Barriers to Rehab medically complex;previous functional deficit;physical barrier  -     Row Name 23 1109          Cognition    Orientation Status (Cognition) oriented x 3;verbal cues/prompts needed for orientation  pt. with situational confusion, pt. seems to be unaware of discharge with hospice stating she was fine and nothing is wrong with her  -     Row Name 23 1105          Safety Issues, Functional Mobility    Safety Issues Affecting Function (Mobility) insight into  deficits/self-awareness;judgment;problem-solving;safety precaution awareness;safety precautions follow-through/compliance;sequencing abilities  -PAYTON     Impairments Affecting Function (Mobility) balance;cognition;endurance/activity tolerance;strength;postural/trunk control;range of motion (ROM)  -PAYTON           User Key  (r) = Recorded By, (t) = Taken By, (c) = Cosigned By    Initials Name Provider Type    Jeannette Carrero OT Occupational Therapist                 Mobility/ADL's     Row Name 03/08/23 1111          Bed Mobility    Rolling Left [place] (Bed Mobility) maximum assist (25% patient effort);2 person assist;verbal cues;nonverbal cues (demo/gesture)  -PAYTON     Rolling Right [place] (Bed Mobility) maximum assist (25% patient effort);2 person assist;verbal cues;nonverbal cues (demo/gesture)  -PAYTON     Assistive Device (Bed Mobility) bed rails;draw sheet  -PAYTON     Comment, (Bed Mobility) sling placement, pt. with good initiation to assist  -PAYTON     Row Name 03/08/23 1111          Bed-Chair Transfer    Bed-Chair [place] (Transfers) dependent (less than 25% patient effort);2 person assist  -PAYTON     Assistive Device (Bed-Chair Transfers) lift device  -PAYTON     Row Name 03/08/23 1111          Grooming Assessment/Training    [place] Level (Grooming) hair care, combing/brushing;moderate assist (50% patient effort);oral care regimen;wash face, hands;set up;supervision  -PAYTON     Position (Grooming) supported sitting;unsupported sitting  -PAYTON     Comment, (Grooming) pt. was able to lean off back of recliner to work on balance with hair brushing and then had to recliner for other due to LLE starting to hurt  -PAYTON           User Key  (r) = Recorded By, (t) = Taken By, (c) = Cosigned By    Initials Name Provider Type    Jeannette Carrero OT Occupational Therapist               Obj/Interventions     Row Name 03/08/23 1113          Shoulder (Therapeutic Exercise)    Shoulder AROM (Therapeutic Exercise)  bilateral;flexion;extension;aBduction;aDduction;horizontal aBduction/aDduction;10 repetitions;supine  completed with visual cue and counted on own  -PAYTON     Row Name 03/08/23 1113          Elbow/Forearm (Therapeutic Exercise)    Elbow/Forearm (Therapeutic Exercise) AROM (active range of motion);strengthening exercise  -PAYTON     Elbow/Forearm AROM (Therapeutic Exercise) bilateral;flexion;15 repititions  pt. could not process to when given resistance to pull against  -PAYTON     Elbow/Forearm Strengthening (Therapeutic Exercise) bilateral;extension;15 repititions  pt. was able to push against resistance with cues  -PAYTON     Row Name 03/08/23 1113          Motor Skills    Therapeutic Exercise shoulder;elbow/forearm  -PAYTON     Row Name 03/08/23 1113          Balance    Static Sitting Balance standby assist  -PAYTON     Dynamic Sitting Balance standby assist  -PAYTON     Position, Sitting Balance supported;unsupported;sitting in chair  -PAYTON     Comment, Balance Pt. was able to work on weight shifting A/P with and without UE support and holdingn unsupported sit with hair brushing for increased core control.  -PAYTON           User Key  (r) = Recorded By, (t) = Taken By, (c) = Cosigned By    Initials Name Provider Type    Jeannette Carrero, OT Occupational Therapist               Goals/Plan     Row Name 03/08/23 1120          Transfer Goal 1 (OT)    Progress/Outcome (Transfer Goal 1, OT) progress slower than expected  -PAYTON     Row Name 03/08/23 1120          Grooming Goal 1 (OT)    Progress/Outcome (Grooming Goal 1, OT) continuing progress toward goal  -PAYTON     Row Name 03/08/23 1120          Strength Goal 1 (OT)    Progress/Outcome (Strength Goal 1, OT) goal met  -PAYTON           User Key  (r) = Recorded By, (t) = Taken By, (c) = Cosigned By    Initials Name Provider Type    Jeannette Carrero, OT Occupational Therapist               Clinical Impression     Row Name 03/08/23 1115          Pain Assessment    Pain Intervention(s) Repositioned  -PAYTON      Row Name 03/08/23 1115          Pain Scale: FACES Pre/Post-Treatment    Pain: FACES Scale, Pretreatment 2-->hurts little bit  -PAYTON     Posttreatment Pain Rating 4-->hurts little more  -PAYTON     Pain Location - Side/Orientation Left  -PAYTON     Pain Location lower  -PAYTON     Pain Location - extremity  and buttocks  -PAYTON     Pre/Posttreatment Pain Comment pillow to left yesica for weight shift and Legs elevated  -PAYTON     Row Name 03/08/23 1115          Plan of Care Review    Plan of Care Reviewed With patient  -PAYTON     Progress improving  -PAYTON     Outcome Evaluation Pt. demonstrated improvement with hair brushing today and UE TE participation and independence.  Pt. oriented x 3, but with situation confusion throughout noted. Pt. with good effort with all tasks, but limited by pain, ROM, strength and endurance.  Pt. with possible discharge tomorrow with hospice.  -     Row Name 03/08/23 1115          Therapy Assessment/Plan (OT)    Rehab Potential (OT) fair, will monitor progress closely  -PAYTON     Therapy Frequency (OT) daily  -     Row Name 03/08/23 1115          Therapy Plan Review/Discharge Plan (OT)    Anticipated Discharge Disposition (OT) other (see comments)  per chart plans now for home with hospice  -     Row Name 03/08/23 1115          Vital Signs    Pre Systolic BP Rehab 125  -PAYTON     Pre Treatment Diastolic BP 76  -PAYTON     Pre SpO2 (%) 98  -PAYTON     O2 Delivery Pre Treatment room air  -PAYTON     O2 Delivery Intra Treatment room air  -PAYTON     Post SpO2 (%) 99  -PAYTON     O2 Delivery Post Treatment room air  -PAYTON     Pre Patient Position Supine  -PAYTON     Intra Patient Position Sitting  -PAYTON     Post Patient Position Sitting  -PAYTON     Row Name 03/08/23 1115          Positioning and Restraints    Pre-Treatment Position in bed  -PAYTON     Post Treatment Position chair  -PAYTON     In Chair notified nsg;reclined;call light within reach;encouraged to call for assist;exit alarm on;waffle cushion;legs elevated;heels elevated;on mechanical lift  sling  pillow to left for weight shift  -PAYTON           User Key  (r) = Recorded By, (t) = Taken By, (c) = Cosigned By    Initials Name Provider Type    Jeannette Carrero OT Occupational Therapist               Outcome Measures     Row Name 03/08/23 1120          How much help from another is currently needed...    Putting on and taking off regular lower body clothing? 1  -PAYTON     Bathing (including washing, rinsing, and drying) 1  -PAYTON     Toileting (which includes using toilet bed pan or urinal) 1  -PAYTON     Putting on and taking off regular upper body clothing 2  -PAYTON     Taking care of personal grooming (such as brushing teeth) 3  -PAYTON     Eating meals 3  -PAYTON     AM-PAC 6 Clicks Score (OT) 11  -PAYTON           User Key  (r) = Recorded By, (t) = Taken By, (c) = Cosigned By    Initials Name Provider Type    Jeannette Carrero OT Occupational Therapist                Occupational Therapy Education     Title: PT OT SLP Therapies (In Progress)     Topic: Occupational Therapy (In Progress)     Point: ADL training (In Progress)     Description:   Instruct learner(s) on proper safety adaptation and remediation techniques during self care or transfers.   Instruct in proper use of assistive devices.              Learning Progress Summary           Patient Acceptance, E,D, NR by  at 3/8/2023 1120    Comment: bed mobility technique, grooming progression with unsupported sit, UE TE    Acceptance, E, NL,NR by [facility] at 3/6/2023 1341    Comment: OT POC; transfer training; benefits of activity    Acceptance, E, NR by  at 3/2/2023 1035                   Point: Home exercise program (In Progress)     Description:   Instruct learner(s) on appropriate technique for monitoring, assisting and/or progressing therapeutic exercises/activities.              Learning Progress Summary           Patient Acceptance, E,D, NR by  at 3/8/2023 1120    Comment: bed mobility technique, grooming progression with unsupported sit, UE TE    Acceptance, E,  NL,NR by [facility] at 3/6/2023 1341    Comment: OT POC; transfer training; benefits of activity    Acceptance, E, NR by  at 3/2/2023 1035                   Point: Precautions (In Progress)     Description:   Instruct learner(s) on prescribed precautions during self-care and functional transfers.              Learning Progress Summary           Patient Acceptance, E, NR by  at 3/2/2023 1035                   Point: Body mechanics (In Progress)     Description:   Instruct learner(s) on proper positioning and spine alignment during self-care, functional mobility activities and/or exercises.              Learning Progress Summary           Patient Acceptance, E,D, NR by  at 3/8/2023 1120    Comment: bed mobility technique, grooming progression with unsupported sit, UE TE    Acceptance, E, NL,NR by [facility] at 3/6/2023 1341    Comment: OT POC; transfer training; benefits of activity    Acceptance, E, NR by  at 3/2/2023 1035                               User Key     Initials Effective Dates Name Provider Type Discipline     02/03/23 -  Jeannette Donato, OT Occupational Therapist OT    [facility] 06/16/21 -  Jayashree Hernandes OT Occupational Therapist OT     10/14/22 -  Natalie Mack OT Occupational Therapist OT              OT Recommendation and Plan  Therapy Frequency (OT): daily  Plan of Care Review  Plan of Care Reviewed With: patient  Progress: improving  Outcome Evaluation: Pt. demonstrated improvement with hair brushing today and UE TE participation and independence.  Pt. oriented x 3, but with situation confusion throughout noted. Pt. with good effort with all tasks, but limited by pain, ROM, strength and endurance.  Pt. with possible discharge tomorrow with hospice.     Time Calculation:    Time Calculation- OT     Row Name 03/08/23 1121             Time Calculation- OT    OT Start Time 1027  -PAYTON      OT Received On 03/08/23  -      OT Goal Re-Cert Due Date 03/12/23  -PAYTON         Timed Charges    05000 - OT  Therapeutic Exercise Minutes 8  -PAYTON      66949 - OT Therapeutic Activity Minutes 5  -PAYTON      71882 - OT Self Care/Mgmt Minutes 10  -PAYTON         Total Minutes    Timed Charges Total Minutes 23  -PAYTON       Total Minutes 23  -PAYTON            User Key  (r) = Recorded By, (t) = Taken By, (c) = Cosigned By    Initials Name Provider Type    Jeannette Carrero OT Occupational Therapist              Therapy Charges for Today     Code Description Service Date Service Provider Modifiers Qty    [id number]  OT THER PROC EA 15 MIN 3/8/2023 Jeannette Donato OT GO 1    [id number]  OT SELF CARE/MGMT/TRAIN EA 15 MIN 3/8/2023 Jeannette Donato OT GO 1               Jeannette Donato OT  3/8/2023

## 2023-03-08 NOTE — PLAN OF CARE
Goal Outcome Evaluation:  Plan of Care Reviewed With: patient        Progress: improving  Outcome Evaluation: Pt. demonstrated improvement with hair brushing today and UE TE participation and independence.  Pt. oriented x 3, but with situation confusion throughout noted. Pt. with good effort with all tasks, but limited by pain, ROM, strength and endurance.  Pt. with possible discharge tomorrow with hospice.

## 2023-03-08 NOTE — PLAN OF CARE
Goal Outcome Evaluation:      Pt's VSS, RA, no tele, wound care provided per orders and Pt turned frequently. Continue POC.

## 2023-03-08 NOTE — THERAPY TREATMENT NOTE
Patient Name: Sol Lovelace  : 1940    MRN: [id number]                              Today's Date: 3/8/2023       Admit Date: 3/1/2023    Visit Dx: No diagnosis found.  Patient Active Problem List   Diagnosis   • E. coli UTI   • Hyponatremia   • Elevated liver enzymes   • Hypoalbuminemia   • Leukocytosis   • Proteinuria   • Paroxysmal atrial fibrillation ([facility])   • Essential hypertension   • Acute urinary retention   • Current chronic use of systemic steroids   • Adrenal insufficiency due to corticosteroid withdrawal ([facility])   • Severe obesity (BMI 35.0-35.9 with comorbidity) ([facility])   • Moderate asthma with exacerbation   • Diarrhea   • Buttock wound   • Chronic indwelling Ayala catheter     Past Medical History:   Diagnosis Date   • Asthma    • Chronic back pain    • GERD (gastroesophageal reflux disease)    • Hyperlipidemia    • Hypertension    • Osteoarthritis    • Rheumatoid arthritis ([facility])      Past Surgical History:   Procedure Laterality Date   • LAPAROSCOPIC VAGINAL HYSTERECTOMY      BSO, bladder suspension      General Information     Row Name 23 1043          Physical Therapy Time and Intention    Document Type therapy note (daily note)  -     Mode of Treatment physical therapy  -     Row Name 23 1043          General Information    Existing Precautions/Restrictions fall;other (see comments)  gluteal and upper back pressure wounds, decreased skin integrity, confusion  -     Barriers to Rehab medically complex;previous functional deficit;physical barrier  -     Row Name 23 1043          Cognition    Orientation Status (Cognition) oriented x 3;verbal cues/prompts needed for orientation  -     Row Name 23 1043          Safety Issues, Functional Mobility    Safety Issues Affecting Function (Mobility) awareness of need for assistance;insight into deficits/self-awareness;safety precaution awareness;safety precautions follow-through/compliance;sequencing abilities  -      Impairments Affecting Function (Mobility) balance;cognition;endurance/activity tolerance;strength;postural/trunk control;range of motion (ROM)  -           User Key  (r) = Recorded By, (t) = Taken By, (c) = Cosigned By    Initials Name Provider Type     Gopi Almaguer, PT Physical Therapist               Mobility     Row Name 03/08/23 1043          Bed Mobility    Bed Mobility rolling left;rolling right  -     Rolling Left [place] (Bed Mobility) maximum assist (25% patient effort);2 person assist;verbal cues;nonverbal cues (demo/gesture)  -     Rolling Right [place] (Bed Mobility) maximum assist (25% patient effort);2 person assist;verbal cues;nonverbal cues (demo/gesture)  -     Assistive Device (Bed Mobility) bed rails;draw sheet  -     Comment, (Bed Mobility) Pt performed rolling L and R for sling placement. Pt required max cues for proper technique for reaching toward bed rail and for bending of contralateral leg for push off to assist with rotation.  -     Row Name 03/08/23 1043          Bed-Chair Transfer    Bed-Chair [place] (Transfers) dependent (less than 25% patient effort);2 person assist  -     Assistive Device (Bed-Chair Transfers) lift device  -     Comment, (Bed-Chair Transfer) Pt tansferred from bed to chair via mechanical lift.  -     Row Name 03/08/23 1043          Sit-Stand Transfer    Sit-Stand [place] (Transfers) unable to assess  -     Comment, (Sit-Stand Transfer) Pt not appropriate to attempt STS transfer at this time d/t poor sitting balance and safety.  -     Row Name 03/08/23 1043          Gait/Stairs (Locomotion)    [place] Level (Gait) unable to assess  -     Comment, (Gait/Stairs) Pt is nonambulatory  -           User Key  (r) = Recorded By, (t) = Taken By, (c) = Cosigned By    Initials Name Provider Type    Gopi Gonzalez, PT Physical Therapist               Obj/Interventions     Row Name 03/08/23 1119          Motor Skills     Therapeutic Exercise hip;knee;ankle  -     Row Name 03/08/23 1119          Hip (Therapeutic Exercise)    Hip Isometrics (Therapeutic Exercise) bilateral;gluteal sets;sitting;10 repetitions  -     Hip Strengthening (Therapeutic Exercise) bilateral;flexion;extension;aBduction;aDduction;external rotation;internal rotation;heel slides;sitting;10 repetitions  -     Row Name 03/08/23 1119          Knee (Therapeutic Exercise)    Knee Isometrics (Therapeutic Exercise) bilateral;quad sets;sitting;10 repetitions  -     Knee Strengthening (Therapeutic Exercise) bilateral;LAQ (long arc quad);sitting;10 repetitions  -[facility] Name 03/08/23 1119          Ankle (Therapeutic Exercise)    Ankle AROM (Therapeutic Exercise) bilateral;dorsiflexion;plantarflexion;sitting;10 repetitions  -[facility] Name 03/08/23 1119          Balance    Balance Assessment sitting static balance;sitting dynamic balance;standing static balance;standing dynamic balance  -     Static Sitting Balance standby assist  -     Dynamic Sitting Balance standby assist  -     Position, Sitting Balance supported;sitting in chair  -     Static Standing Balance unable to assess  -     Dynamic Standing Balance unable to assess  -     Comment, Balance Pt initially required Jessica for anterior weight shift to practice sitting upright unsupported. Pt performed anterior/posterior weight shifts without UE support x10 reps.  -           User Key  (r) = Recorded By, (t) = Taken By, (c) = Cosigned By    Initials Name Provider Type     Gopi Almaguer PT Physical Therapist               Goals/Plan    No documentation.                Clinical Impression     Row Name 03/08/23 1043          Pain Scale: FACES Pre/Post-Treatment    Pain: FACES Scale, Pretreatment 2-->hurts little bit  -     Posttreatment Pain Rating 2-->hurts little bit  -     Pain Location - Side/Orientation Bilateral  -     Pre/Posttreatment Pain Comment bilateral buttock pain  -      [facility] Name 03/08/23 1043          Plan of Care Review    Plan of Care Reviewed With patient  -LH     Progress improving  -     Outcome Evaluation Pt with good participation in therapy session. Pt demonstrating small improvements in functional activity tolerance and sitting balance. Pt continuing to require MaxAx2 for bed mobility and dependent mechanical lift from bed to chair. Pt continuing with considerable generalized weakness, poor functional activity tolerance, and confusion limiting pt's functional mobility. Pt plans to continue current POC as tolerated.  -Count includes the [facility] Name 03/08/23 1043          Vital Signs    Pre Systolic BP Rehab 125  -LH     Pre Treatment Diastolic BP 76  -LH     Post Systolic BP Rehab 108  -LH     Post Treatment Diastolic BP 81  -LH     Posttreatment Heart Rate (beats/min) 92  -LH     Pre SpO2 (%) 94  -LH     O2 Delivery Pre Treatment room air  -     O2 Delivery Intra Treatment room air  -LH     Post SpO2 (%) 95  -LH     O2 Delivery Post Treatment room air  -     Pre Patient Position Supine  -     Intra Patient Position Sitting  -     Post Patient Position Sitting  -Count includes the [facility] Name 03/08/23 1043          Positioning and Restraints    Pre-Treatment Position in bed  -     Post Treatment Position chair  -     In Chair notified nsg;reclined;call light within reach;encouraged to call for assist;exit alarm on;waffle cushion;on mechanical lift sling;legs elevated;heels elevated  -           User Key  (r) = Recorded By, (t) = Taken By, (c) = Cosigned By    Initials Name Provider Type     Gopi Almaguer, PT Physical Therapist               Outcome Measures     Row Name 03/08/23 1043          How much help from another person do you currently need...    Turning from your back to your side while in flat bed without using bedrails? 2  -LH     Moving from lying on back to sitting on the side of a flat bed without bedrails? 1  -LH     Moving to and from a bed to a chair (including a  wheelchair)? 1  -     Standing up from a chair using your arms (e.g., wheelchair, bedside chair)? 1  -LH     Climbing 3-5 steps with a railing? 1  -     To walk in hospital room? 1  -     AM-PAC 6 Clicks Score (PT) 7  -     Highest level of mobility 2 --> Bed activities/dependent transfer  -     Row Name 03/08/23 1043          Functional Assessment    Outcome Measure Options AM-PAC 6 Clicks Basic Mobility (PT)  -           User Key  (r) = Recorded By, (t) = Taken By, (c) = Cosigned By    Initials Name Provider Type     Gopi Almaguer, PT Physical Therapist                             Physical Therapy Education     Title: PT OT SLP Therapies (In Progress)     Topic: Physical Therapy (Done)     Point: Mobility training (Done)     Learning Progress Summary           Patient Acceptance, E, VU,NR by  at 3/8/2023 1125    Acceptance, E, VU,NR by NS at 3/7/2023 1404    Acceptance, E, VU,NR by NS at 3/6/2023 1334    Acceptance, E, VU by KR at 3/2/2023 0914                   Point: Home exercise program (Done)     Learning Progress Summary           Patient Acceptance, E, VU,NR by  at 3/8/2023 1125    Acceptance, E, VU,NR by NS at 3/7/2023 1404    Acceptance, E, VU,NR by NS at 3/6/2023 1334    Acceptance, E, VU by KR at 3/2/2023 0914                   Point: Body mechanics (Done)     Learning Progress Summary           Patient Acceptance, E, VU,NR by  at 3/8/2023 1125    Acceptance, E, VU,NR by NS at 3/7/2023 1404    Acceptance, E, VU,NR by NS at 3/6/2023 1334    Acceptance, E, VU by KR at 3/2/2023 0914                   Point: Precautions (Done)     Learning Progress Summary           Patient Acceptance, E, VU,NR by  at 3/8/2023 1125    Acceptance, E, VU,NR by NS at 3/7/2023 1404    Acceptance, E, VU,NR by NS at 3/6/2023 1334    Acceptance, E, VU by KR at 3/2/2023 0914                               User Key     Initials Effective Dates Name Provider Type Arnaldo TOMLIN 06/16/21 -  Ness Murguia, PT  Physical Therapist PT     09/21/21 -  Gopi Almaguer PT Physical Therapist PT     12/30/22 -  Kristel Albright PT Physical Therapist PT              PT Recommendation and Plan     Plan of Care Reviewed With: patient  Progress: improving  Outcome Evaluation: Pt with good participation in therapy session. Pt demonstrating small improvements in functional activity tolerance and sitting balance. Pt continuing to require MaxAx2 for bed mobility and dependent mechanical lift from bed to chair. Pt continuing with considerable generalized weakness, poor functional activity tolerance, and confusion limiting pt's functional mobility. Pt plans to continue current POC as tolerated.     Time Calculation:    PT Charges     Row Name 03/08/23 1126             Time Calculation    Start Time 1043  -      PT Received On 03/08/23  -      PT Goal Re-Cert Due Date 03/12/23  -         Timed Charges    00625 - PT Therapeutic Exercise Minutes 10  -      71748 - PT Therapeutic Activity Minutes 13  -         Total Minutes    Timed Charges Total Minutes 23  -       Total Minutes 23  -            User Key  (r) = Recorded By, (t) = Taken By, (c) = Cosigned By    Initials Name Provider Type     Gopi Almaguer, PT Physical Therapist              Therapy Charges for Today     Code Description Service Date Service Provider Modifiers Qty    48351688613 HC PT THER PROC EA 15 MIN 3/8/2023 Gopi Almaguer, PT GP 1    08817269943 HC PT THERAPEUTIC ACT EA 15 MIN 3/8/2023 Gopi Almaguer, PT GP 1          PT G-Codes  Outcome Measure Options: AM-PAC 6 Clicks Basic Mobility (PT)  AM-PAC 6 Clicks Score (PT): 7  AM-PAC 6 Clicks Score (OT): 11  PT Discharge Summary  Anticipated Discharge Disposition (PT): skilled nursing facility    Gopi Almaguer PT  3/8/2023

## 2023-03-08 NOTE — PLAN OF CARE
Goal Outcome Evaluation:            Skin interventions in place and wound care performed. No acute events this shift. Will cont to monitor.

## 2023-03-08 NOTE — PROGRESS NOTES
[facility] Progress Note    Admission Date: 3/1/2023    Sol Lovelace  1940  [id number]    Date: 3/8/2023    Antibiotics:  Anti-Infectives (From admission, onward)    Ordered     Dose/Rate Route Frequency Start Stop    03/03/23 1437  cefTRIAXone (ROCEPHIN) 2 g/100 mL 0.9% NS IVPB (MBP)        Ordering Provider: Selene Jefferson MD    2 g  over 30 Minutes Intravenous Every 24 Hours Scheduled 03/03/23 2200 03/05/23 2133    03/01/23 2250  vancomycin IVPB 2000 mg in 0.9% Sodium Chloride (premix) 500 mL        Ordering Provider: Josafat Lujan PharmD    20 mg/kg × 97.1 kg  over 120 Minutes Intravenous Once 03/01/23 2345 03/02/23 0204        Reason for Consultation: uti, perianal wounds     History of present illness:    Patient is a 82 y.o. female with multiple medical problems including Rheumatoid arthritis, chronic back pain, immobility and urinary retention requiring gerber catheterization. She was referred for admission from the Urology clinic after missing multiple appointments for catheter exchange and discussion of suprapubic catheter placement. At the clinic she was encephalopathic, had purulent urine and had multiple wounds on her perineum and gluteal area. Evidently she has chronic diarrhea and incontinence. She was referred for admission. WBC 12.57, procalcitonin 0.33, UA with TNTC WBC. Urine and blood cultures are pending. She was started on ceftriaxone. She has a history of an Ecoli uti 6 weeks ago with a sensitive Ecoli treated with cipro.  No family members are at the bedside and she is unable to provide additional history Per RN she does not appear to have severe diarrhea    3/3/23 Afebrile, alert, no new c/o  States that she is able to eat w/o problems  According to RN she is much more cooperative today   Urine discarded by lab because of multiple moira despite UA with TNTC WBC Stool sent yesterday for GI panel and cdiff - negative    3/7/23 Afebrile, alert, diarrhea improved She completed ceftriaxone  3/5. Repeat UA 3/3 with TNTC wbc but although culture was requested it was not performed She is requesting placement for rehab    3/8/23 afebrile, alert, diarrhea improved  She has been unable to make any progress with PT/OT and has been turned down for rehab. Plans are for her to go home with hospice       Full 12 point review of systems reviewed and negative otherwise for acute complaints,       PE:  Vital Signs  Temp  Min: 97.1 °F (36.2 °C)  Max: 97.9 °F (36.6 °C)  BP  Min: 109/80  Max: 126/94  Pulse  Min: 66  Max: 86  Resp  Min: 20  Max: 20  SpO2  Min: 93 %  Max: 98 %  GENERAL: alert, cooperative  HEENT: Normocephalic, atraumatic.EOMI. No conjunctival injection. No icterus. Oropharynx poorly visualized.     NECK: Supple without nuchal rigidity. No masses.  LYMPH: No cervical lymphadenopathy.  HEART: RRR; No murmur, rubs, gallops.   LUNGS: No wheezing, rales, rhonchi. Poor respiratory effort. Nonlabored.   ABDOMEN: Soft, nontender, nondistended. Positive bowel sounds. No rebound or guarding.  EXT:  trace- 1+ edema  :  With Ayala catheter. Pictures provided by the [facility] showed multiple wounds with full-thickness skin loss at her left ischial tuberosity and bilateral medial gluteals. The left ischial wound could not be fully probed because of abundant fibrous slough  MSK: No joint effusions or erythema; multiple small ecchymoses    NEURO: Oriented to person and place.  Motor strength not assessed because of diffuse pain    Laboratory Data    Results from last 7 days   Lab Units 03/04/23  0407 03/03/23  0424 03/02/23  0250   WBC 10*3/mm3 7.38 7.32 11.58*   HEMOGLOBIN g/dL 10.6* 9.8* 12.5   HEMATOCRIT % 33.4* 31.4* 39.4   PLATELETS 10*3/mm3 200 163 153     Results from last 7 days   Lab Units 03/05/23  0603   SODIUM mmol/L 138   POTASSIUM mmol/L 4.1   CHLORIDE mmol/L 105   CO2 mmol/L 23.0   BUN mg/dL 10   CREATININE mg/dL 0.25*   GLUCOSE mg/dL 67   CALCIUM mg/dL 7.7*     Results from last 7 days   Lab Units  03/01/23  2105   ALK PHOS U/L 69   BILIRUBIN mg/dL 0.9   ALT (SGPT) U/L 15   AST (SGOT) U/L 25               Estimated Creatinine Clearance: 199.2 mL/min (A) (by C-G formula based on SCr of 0.25 mg/dL (L)).      Microbiology:  Urine discarded as above; repeat culture sent    Radiology:  Imaging Results (Last 72 Hours)     ** No results found for the last 72 hours. **          I personally reviewed the radiographic studies   Impression:      -- Complicated uti in patient who requires chronic gerber catheter  S/p 5 days rocephin  Repeat UA shows that pyuria has cleared     -- Multiple perineal and gluteal wounds which are unstageable and resulting from chronic incontinence and pressure      -- Urinary retention requiring chronic catheterization     -- Chronic pain that precludes cooperation with wound care, mobilization etc     -- Diarrhea,improved  -- Chronic steroid use     -- Prior diagnosis of Rheumatoid Arthritis, data deficit     --BMI 37.9 which complicates management of diarrhea and skin care        PLAN/RECOMMENDATIONS:   Thank you for asking us to see Sol Lovelace, I recommend the following:     -- no antibiotics are planned    -- stool studies ordered- negative except qualitative + cdiff and diarrhea improved without oral vanc       Discussed with RN        Raquel Anderson MD  3/8/2023

## 2023-03-09 VITALS
RESPIRATION RATE: 16 BRPM | SYSTOLIC BLOOD PRESSURE: 123 MMHG | BODY MASS INDEX: 37.92 KG/M2 | WEIGHT: 214 LBS | DIASTOLIC BLOOD PRESSURE: 79 MMHG | HEART RATE: 81 BPM | HEIGHT: 63 IN | OXYGEN SATURATION: 99 % | TEMPERATURE: 97 F

## 2023-03-09 PROCEDURE — 99239 HOSP IP/OBS DSCHRG MGMT >30: CPT | Performed by: NURSE PRACTITIONER

## 2023-03-09 PROCEDURE — 97530 THERAPEUTIC ACTIVITIES: CPT

## 2023-03-09 PROCEDURE — 97110 THERAPEUTIC EXERCISES: CPT

## 2023-03-09 PROCEDURE — 63710000001 PREDNISONE PER 5 MG: Performed by: INTERNAL MEDICINE

## 2023-03-09 RX ORDER — PREDNISONE 10 MG/1
10 TABLET ORAL
Start: 2023-03-10

## 2023-03-09 RX ORDER — CASTOR OIL AND BALSAM, PERU 788; 87 MG/G; MG/G
1 OINTMENT TOPICAL EVERY 12 HOURS SCHEDULED
Qty: 28.35 G | Refills: 0 | Status: SHIPPED | OUTPATIENT
Start: 2023-03-09

## 2023-03-09 RX ADMIN — PREDNISONE 10 MG: 10 TABLET ORAL at 09:19

## 2023-03-09 RX ADMIN — CASTOR OIL AND BALSAM, PERU 1 APPLICATION: 788; 87 OINTMENT TOPICAL at 09:20

## 2023-03-09 RX ADMIN — APIXABAN 5 MG: 5 TABLET, FILM COATED ORAL at 09:19

## 2023-03-09 RX ADMIN — BISOPROLOL FUMARATE 10 MG: 5 TABLET, FILM COATED ORAL at 09:19

## 2023-03-09 RX ADMIN — Medication 1 APPLICATION: at 09:20

## 2023-03-09 RX ADMIN — Medication 10 ML: at 09:19

## 2023-03-09 NOTE — PLAN OF CARE
Goal Outcome Evaluation:  Plan of Care Reviewed With: patient        Progress: improving  Outcome Evaluation: Patient continues to be limited by weakness, decreased activity tolerance, and deficits in trunk control. Session focused on tasks for patient to complete independently, including UE/LE HEP and core activation activities. Will continue per PT POC to support return to baseline.

## 2023-03-09 NOTE — DISCHARGE PLACEMENT REQUEST
"Vianney Lovelace (82 y.o. Female)   Discharge summary    Date of Birth   1940    Social Security Number       [place]   [address]  [facility] [address]    Home Phone   [phone]    MRN   [id number]       Taoist   None    Marital Status                               Admission Date   3/1/23    Admission Type   Urgent    Admitting Provider   Salima Mario DO    Attending Provider   Salima Mario DO    Department, Room/Bed   [facility] 6B, N638/1       Discharge Date       Discharge Disposition   Hospice/Home    Discharge Destination                               Attending Provider: Salima Mario DO    Allergies: Macrobid [Nitrofurantoin], Naproxen, Oxaprozin, Sulfa Antibiotics    Isolation: Spore   Infection: MRSA (03/02/23), C.difficile (03/02/23)   Code Status: No CPR    Ht: 160 cm (63\")   Wt: 97.1 kg (214 lb)    Admission Cmt: None   Principal Problem: Diarrhea [R19.7]                 Active Insurance as of 3/1/2023     Primary Coverage     Payor Plan Insurance Group Employer/Plan Group    MEDICARE MEDICARE A & B      Payor Plan Address Payor Plan Phone Number Payor Plan Fax Number Effective Dates    [address] [phone]  6/1/2007 - None Entered    [facility] [id number]       Subscriber Name Subscriber Birth Date Member ID       VIANNEY LOVELACE 1940 [id number]           Secondary Coverage     Payor Plan Insurance Group Employer/Plan Group    [facility] SUPP [id number]     Payor Plan Address Payor Plan Phone Number Payor Plan Fax Number Effective Dates    [address]   12/1/2016 - None Entered    [facility] [id number]       Subscriber Name Subscriber Birth Date Member ID       VIANNEY LOVELACE 1940 [id number]                 Emergency Contacts      (Rel.) Home Phone Work Phone Mobile Phone    ALETA MICHAEL (Daughter) [phone] -- [phone]    JELLY LOVELACE (Son) [phone] -- [phone]             "   Discharge Summary      Lily Jacobo APRN at 23 1210     Attestation signed by Salima Mario DO at 23 9295    I have reviewed this documentation and agree.                      [facility] Medicine Services  DISCHARGE SUMMARY    Patient Name: Sol Lovelace  : 1940  MRN: [id number]    Date of Admission: 3/1/2023  4:33 PM  Date of Discharge: 3/9/2023  Primary Care Physician: Young Huynh MD    Consults     Date and Time Order Name Status Description    3/2/2023 12:34 AM Inpatient Infectious Diseases Consult Completed           Hospital Course     Presenting Problem:   Diarrhea [R19.7]    Active Hospital Problems    Diagnosis  POA   • **Diarrhea [R19.7]  Yes   • Buttock wound [S31.809A]  Yes   • Chronic indwelling Gerber catheter [Z97.8]  Not Applicable   • Current chronic use of systemic steroids [Z79.52]  Not Applicable   • 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          Hospital Course:  Sol Lovelace is a 82 y.o. female w adrenal insufficiency 2/2 chronic steroid use, debility and bedbound status, urinary retention requiring gerber who presents w profuse diarrhea x days. Found to have sacral wounds on examination.     Severe sepsis 2/2 diarrheal illness w lactic acidosis - resolved  - GI PCR negative, C diff toxin positive, Atg negative not active Cdiff  - spore precautions per infection control  - OK for imodium PRN     MASD, incontinence associated skin damage  -no s/s of fistula or deeper wound, wound nursing to see for f/u     Chronic steroids use w adrenal insufficiency  -chronic prednisone 10 mg daily. H/o hyponatremia in past off steroids  -maintaining glucose, d/c  checks. Some lower temps charted but appears controlled otherwise     Pyuria in setting of chronic Ayala - s/p 5 days ceftriaxone, suspect E coli colonization in retrospect after dx review     Afib -rate controled, cont eliquis and bisoprolol for rate control  Relative hypotension in setting of chronic HTN, improved - hold nifedipine  Asthma -cont advair  HLD -cont lipitor  Chronic debility, BMI 37 - complicates daily care  Hypokalemia, likely 2/2 diarrhea - replace per protocol  Hypomagnesemia - replace per protocol    Patient is ready to be discharged home and will be transported by ambulance at 2:45P today.  Patient will be discharging home to hospice.  Discharge follow-up recommendations are listed below.    Discharge Follow Up Recommendations for outpatient labs/diagnostics:  -- Follow-up with hospice provider   --Clean patient's bottom and perineum avoiding pressure injuries and/or dressings to left ischial tuberosity and right gluteal with pH balanced foaming cleanser and barrier wipes, pat dry.  Apply dusting of stoma powder if needed to moist areas then apply thick layer of Desitin cream BID and as needed after any incontinence episodes.  Cover Desitin with plastic film to prevent further excoriation and to keep Desitin barrier cream in place.   --Hip and thoracic spine wounds: Cleanse with normal saline, pat dry, apply skin protectant to periwound skin, gently and loosely fill wound bed with Therahoney sheet cut to shape of ribbon, and cover with silicone foam border dressing Q3days and PRN for soiling.  Day of Discharge     HPI:   Patient states that she is happy to be going home today.  Eating well.  No adverse events overnight per nursing.  No complaints of diarrhea today.    Review of Systems  Gen- No fevers, chills  CV- No chest pain, palpitations  Resp- No cough, dyspnea  GI- No N/V/D, abd pain  All other systems have been reviewed and the pertinent positives and negatives have been listed above in  the HPI or ROS      Vital Signs:   Temp:  [96.3 °F (35.7 °C)-98 °F (36.7 °C)] 97 °F (36.1 °C)  Heart Rate:  [67-83] 81  Resp:  [16-18] 16  BP: (112-149)/() 123/79  Flow (L/min):  [2] 2      Physical Exam:  Constitutional: Alert, elderly obese female sitting up in bed in NAD  Eyes: EOMI, sclerae anicteric, no conjunctival injection  Head: NCAT  ENT: [place]-Carmen, moist mucous membranes   Respiratory: Nonlabored, symmetrical chest expansion, CTAB, 97% 2 L  Cardiovascular: RRR, HR 87 no M/R/G, +DP pulses bilaterally  Gastrointestinal: Soft, NT, ND +BS  : Ayala draining clear yellow urine  Musculoskeletal: GONZALEZ; no LE edema bilaterally  Neurologic: Oriented x4, strength symmetric in all extremities but weak, follows all commands, speech clear  Skin: No rashes on exposed skin  Psychiatric: Pleasant and cooperative; normal affect      Pertinent  and/or Most Recent Results     LAB RESULTS:      Lab 03/04/23  0407 03/03/23 0424 03/02/23  1937 03/02/23  1229   WBC 7.38 7.32  --   --    HEMOGLOBIN 10.6* 9.8*  --   --    HEMATOCRIT 33.4* 31.4*  --   --    PLATELETS 200 163  --   --    MCV 89.5 89.0  --   --    LACTATE  --   --  1.5 2.2*         Lab 03/05/23  0603 03/04/23  0407 03/03/23  0424 03/02/23  1229   SODIUM 138 136 133*  --    POTASSIUM 4.1 3.4* 3.8 4.1   CHLORIDE 105 101 102  --    CO2 23.0 24.0 24.0  --    ANION GAP 10.0 11.0 7.0  --    BUN 10 7* 10  --    CREATININE 0.25* 0.25* 0.26*  --    EGFR 112.2 112.2 111.2  --    GLUCOSE 67 65 72  --    CALCIUM 7.7* 7.4* 7.6*  --                          Brief Urine Lab Results  (Last result in the past 365 days)      Color   Clarity   Blood   Leuk Est   Nitrite   Protein   CREAT   Urine HCG        03/07/23 0945 Yellow   Turbid   Moderate (2+)   Small (1+)   Negative   30 mg/dL (1+)               Microbiology Results (last 10 days)     Procedure Component Value - Date/Time    Urine Culture - Urine, Indwelling Urethral Catheter [[id number]]  (Normal) Collected: 03/03/23 9306     Lab Status: Final result Specimen: Urine from Indwelling Urethral Catheter Updated: 03/04/23 2033     Urine Culture No growth    Clostridioides difficile Toxin - Stool, Per Rectum [[id number]]  (Abnormal) Collected: 03/02/23 1828    Lab Status: Final result Specimen: Stool from Per Rectum Updated: 03/02/23 2033    Narrative:      The following orders were created for panel order Clostridioides difficile Toxin - Stool, Per Rectum.  Procedure                               Abnormality         Status                     ---------                               -----------         ------                     Clostridioides difficile...[[id number]]  Abnormal            Final result                 Please view results for these tests on the individual orders.    Clostridioides difficile Toxin, PCR - Stool, Per Rectum [[id number]]  (Abnormal) Collected: 03/02/23 1828    Lab Status: Final result Specimen: Stool from Per Rectum Updated: 03/02/23 2033     C. Difficile Toxins by PCR Detected    Narrative:      DNA from a toxigenic strain of C.difficile has been detected. Antigen testing for the presence of free C.difficile toxin is currently in progress, to help determine the clinical significance of this PCR result.     Gastrointestinal Panel, PCR - Stool, Per Rectum [[id number]]  (Normal) Collected: 03/02/23 1828    Lab Status: Final result Specimen: Stool from Per Rectum Updated: 03/02/23 2030     Campylobacter Not Detected     Plesiomonas shigelloides Not Detected     Salmonella Not Detected     Vibrio Not Detected     Vibrio cholerae Not Detected     Yersinia enterocolitica Not Detected     Enteroaggregative E. coli (EAEC) Not Detected     Enteropathogenic E. coli (EPEC) Not Detected     Enterotoxigenic E. coli (ETEC) lt/st Not Detected     Shiga-like toxin-producing E. coli (STEC) stx1/stx2 Not Detected     Shigella/Enteroinvasive E. coli (EIEC) Not Detected     Cryptosporidium Not Detected     Cyclospora cayetanensis Not  Detected     Entamoeba histolytica Not Detected     Giardia lamblia Not Detected     Adenovirus F40/41 Not Detected     Astrovirus Not Detected     Norovirus GI/GII Not Detected     Rotavirus A Not Detected     Sapovirus (I, II, IV or V) Not Detected    Clostridioides difficile toxin Ag, Reflex - Stool, Per Rectum [[id number]]  (Normal) Collected: 03/02/23 1828    Lab Status: Final result Specimen: Stool from Per Rectum Updated: 03/02/23 2106     C.diff Toxin Ag Negative    Narrative:      DNA from a toxigenic strain of C.difficile was detected, although the free toxin itself was not detected. These findings are consistent with C.difficile colonization and may not reflect actual C.difficile infection. Clinical correlation needed.    MRSA Screen, PCR (Inpatient) - Swab, Nares [[id number]]  (Abnormal) Collected: 03/02/23 0924    Lab Status: Final result Specimen: Swab from Nares Updated: 03/02/23 1115     MRSA PCR Positive    Narrative:      The negative predictive value of this diagnostic test is high and should only be used to consider de-escalating anti-MRSA therapy. A positive result may indicate colonization with MRSA and must be correlated clinically.    Urine Culture - Urine, Urine, Clean Catch [[id number]]  (Abnormal)  (Susceptibility) Collected: 03/01/23 2327    Lab Status: Edited Result - FINAL Specimen: Urine, Clean Catch Updated: 03/04/23 0940     Urine Culture >100,000 CFU/mL Mixed Aileen Isolated      >100,000 CFU/mL Escherichia coli    Narrative:      Specimen contains mixed organisms of questionable pathogenicity suggestive of contamination. If symptoms persist, suggest recollection.  Colonization of the urinary tract without infection is common. Treatment is discouraged unless the patient is symptomatic, pregnant, or undergoing an invasive urologic procedure.    Susceptibility      Escherichia coli      ELOINA      Ampicillin Resistant     Ampicillin + Sulbactam Intermediate      Cefazolin Susceptible       Cefepime Susceptible      Ceftazidime Susceptible      Ceftriaxone Susceptible      Gentamicin Susceptible      Levofloxacin Intermediate      Nitrofurantoin Susceptible      Piperacillin + Tazobactam Susceptible      Trimethoprim + Sulfamethoxazole Resistant                          Blood Culture - Blood, Hand, Left [[id number]]  (Normal) Collected: 03/01/23 2105    Lab Status: Final result Specimen: Blood from Hand, Left Updated: 03/06/23 2131     Blood Culture No growth at 5 days    Narrative:      Aerobic bottle only      Blood Culture - Blood, Arm, Left [[id number]]  (Normal) Collected: 03/01/23 2105    Lab Status: Final result Specimen: Blood from Arm, Left Updated: 03/06/23 2131     Blood Culture No growth at 5 days    Narrative:      Aerobic bottle only            CT Abdomen Pelvis With & Without Contrast    Result Date: 3/2/2023  CT ABDOMEN PELVIS W WO CONTRAST Date of Exam: 3/2/2023 2:46 PM EST Indication: UTI, recurrent/complicated (Female) Sepsis complicated sacral wound- eval for fistula. Comparison: None available. Technique: Axial CT images were obtained of the abdomen and pelvis before and after the uneventful intravenous administration of 95 mL Isovue-300. Sagittal and coronal reconstructions were performed.  Automated exposure control and iterative reconstruction methods were used. FINDINGS: Bibasilar infiltrates are noted. There is a small left pleural effusion. The findings suggest changes of bibasilar pneumonia. The liver, spleen, and pancreas are unremarkable. The gallbladder and bile ducts are unremarkable. The bilateral adrenal glands are symmetric and unremarkable. The bilateral kidneys are symmetric and unremarkable. There is no bowel dilatation or obstruction. A normal-appearing appendix is observed. There is no evidence for acute appendicitis. No significant free fluid is observed. No abnormal fluid collections are identified. No significant lymphadenopathy is seen  throughout the abdomen  or pelvis. The bladder is decompressed with a Ayala catheter in place. The celiac and superior mesenteric arterial distributions are opacified without evidence for occlusion. There is abnormal attenuation involving the soft tissues of the left buttock. The findings suggest changes of a developing decubitus wound. There may be associated soft tissue cellulitis. No well-defined fluid collection is seen to suggest abscess. There  is additional focal edema within the subcutaneous soft tissues at the left lateral aspect of the abdomen and pelvis. The findings may indicate an additional area of soft tissue cellulitis. Again no definitive well-defined fluid collection is seen to suggest abscess. No acute osseous abnormalities are observed.     1.Evidence for bibasilar pneumonia. 2.Evidence for a decubitus wound involving the left buttock soft tissues. There is evidence for associated soft tissue cellulitis. No definitive abscess is seen. There is no evidence for fistula. 3.Evidence for abnormal edema within the subcutaneous soft tissues overlying the left lateral aspect of the abdomen and pelvis. The findings may indicate changes of soft tissue cellulitis. No focal abscess is seen. Electronically Signed: Henry Dee  3/2/2023 3:33 PM EST  Workstation ID: [id number]    XR Chest 1 View    Result Date: 3/2/2023  XR CHEST 1 VW Date of Exam: 3/2/2023 5:44 PM EST Indication: pna on CT a/p?. Comparison: Abdomen pelvis CT scan of same date. Portable chest radiograph of 10/25/2022 Findings: Heart shadow is of normal size. The vasculature is cephalized, with appearance of mild perihilar edema. There is only mild visible left basilar discoid atelectasis and effusion. No pneumothorax is seen. There is a marked dextroconvex thoracic scoliosis.     Impression: 1. Mild pulmonary vascular congestion. 2. Mild left basilar atelectasis and trace effusion, less apparent than seen on earlier CT scan. Electronically Signed: Silas Dubose  3/2/2023  6:06 PM EST  Workstation ID: [id number]              Results for orders placed during the hospital encounter of 10/18/22    Adult Transthoracic Echo Complete W/ Cont if Necessary Per Protocol    Interpretation Summary  •  Estimated left ventricular EF = 55% Left ventricular systolic function is normal.  •  Estimated right ventricular systolic pressure from tricuspid regurgitation is normal (<35 mmHg). Calculated right ventricular systolic pressure from tricuspid regurgitation is 28 mmHg.      Plan for Follow-up of Pending Labs/Results:     Discharge Details        Discharge Medications      New Medications      Instructions Start Date   castor oil-balsam peru ointment   1 application, Topical, Every 12 Hours Scheduled      zinc oxide 40 % paste paste  Commonly known as: DESITIN   1 application, Topical, Every 12 Hours Scheduled         Changes to Medications      Instructions Start Date   predniSONE 10 MG tablet  Commonly known as: DELTASONE  What changed:   · medication strength  · how much to take  · when to take this   10 mg, Oral, Daily With Breakfast   Start Date: March 10, 2023        Continue These Medications      Instructions Start Date   albuterol sulfate  (90 Base) MCG/ACT inhaler  Commonly known as: PROVENTIL HFA;VENTOLIN HFA;PROAIR HFA   2 puffs, Inhalation, Every 4 Hours PRN      apixaban 5 MG tablet tablet  Commonly known as: ELIQUIS   5 mg, Oral, Every 12 Hours Scheduled      atorvastatin 20 MG tablet  Commonly known as: LIPITOR   1 tablet, Oral, Daily      B-12 2500 MCG sublingual tablet   2 tablets, Sublingual, Daily      bisacodyl 5 MG EC tablet  Commonly known as: DULCOLAX   10 mg, Oral, Daily      bisoprolol 10 MG tablet  Commonly known as: ZEBeta   10 mg, Oral, Every 24 Hours Scheduled      Fluticasone-Salmeterol 250-50 MCG/ACT DISKUS  Commonly known as: ADVAIR/WIXELA   1 puff, Inhalation, 2 Times Daily      ondansetron 4 MG tablet  Commonly known as: ZOFRAN   4 mg, Oral, Every 6 Hours  PRN      simethicone 80 MG chewable tablet  Commonly known as: MYLICON   160 mg, Oral, 3 Times Daily PRN      vitamin b complex capsule capsule   1 capsule, Oral, Daily         Stop These Medications    estradiol 0.1 MG/GM vaginal cream  Commonly known as: ESTRACE     NIFEdipine CC 30 MG 24 hr tablet  Commonly known as: ADALAT CC     nitrofurantoin (macrocrystal-monohydrate) 100 MG capsule  Commonly known as: Macrobid     pantoprazole 40 MG EC tablet  Commonly known as: PROTONIX            Allergies   Allergen Reactions   • Macrobid [Nitrofurantoin] Nausea And Vomiting   • Naproxen Unknown (See Comments)   • Oxaprozin Hives   • Sulfa Antibiotics Unknown - Low Severity         Discharge Disposition:  Hospice/Home    Diet:  Hospital:  Diet Order   Procedures   • Diet: Regular/House Diet; Texture: Regular Texture (IDDSI 7); Fluid Consistency: Thin (IDDSI 0)       Activity:  Activity Instructions     Activity as Tolerated            Restrictions or Other Recommendations:  None       CODE STATUS:    Code Status and Medical Interventions:   Ordered at: 03/02/23 0919     Medical Intervention Limits:    NO intubation (DNI)     Code Status (Patient has no pulse and is not breathing):    No CPR (Do Not Attempt to Resuscitate)     Medical Interventions (Patient has pulse or is breathing):    Limited Support     Comments:    no ICU escalation     Release to patient:    Routine Release       Future Appointments   Date Time Provider Department Center   3/9/2023  2:45 PM EMS 2 [facility]       Additional Instructions for the Follow-ups that You Need to Schedule     Discharge Follow-up with Specified Provider: Follow-up with hospice provider   As directed      To: Follow-up with hospice provider               GLADIS Shay  03/09/23    Wound measures approximately 0.4x0.3x0.3 cm.  There is a area of slough on the lateral aspect of the wound bed.  The wound bed is pink, blanchable and bleeding when palpated. Hemostasis  achieved. Could be a nevi that has been inadvertently removed vs a small abscess. Wound edges are irregular and does not match characteristics of a pressure injury.  Etiology unknown. No purulent drainage noted. Small nevi noted superior/lateral to wound. Periwound skin pink, intact and blanchable when compared to previous photo. Recommend applying a small dab of therahoney to the wound bed and covering with an optfioam dressing.  See wound care orders. Pt to be discharged today with hospice care at home.

## 2023-03-09 NOTE — DISCHARGE SUMMARY
[facility] Medicine Services  DISCHARGE SUMMARY    Patient Name: Sol Lovelace  : 1940  MRN: [id number]    Date of Admission: 3/1/2023  4:33 PM  Date of Discharge: 3/9/2023  Primary Care Physician: Young Huynh MD    Consults     Date and Time Order Name Status Description    3/2/2023 12:34 AM Inpatient Infectious Diseases Consult Completed           Hospital Course     Presenting Problem:   Diarrhea [R19.7]    Active Hospital Problems    Diagnosis  POA   • **Diarrhea [R19.7]  Yes   • Buttock wound [S31.809A]  Yes   • Chronic indwelling Gerber catheter [Z97.8]  Not Applicable   • Current chronic use of systemic steroids [Z79.52]  Not Applicable   • Adrenal insufficiency due to corticosteroid withdrawal ([facility]) [E27.3, T38.0X5A]  Yes   • Severe obesity (BMI 35.0-35.9 with comorbidity) ([facility]) [E66.01, Z68.35]  Not Applicable   • Paroxysmal atrial fibrillation ([facility]) [I48.0]  Yes   • Essential hypertension [I10]  Yes      Resolved Hospital Problems   No resolved problems to display.          Hospital Course:  Sol Lovelace is a 82 y.o. female w adrenal insufficiency 2/2 chronic steroid use, debility and bedbound status, urinary retention requiring gerber who presents w profuse diarrhea x days. Found to have sacral wounds on examination.     Severe sepsis 2/2 diarrheal illness w lactic acidosis - resolved  - GI PCR negative, C diff toxin positive, Atg negative not active Cdiff  - spore precautions per infection control  - OK for imodium PRN     MASD, incontinence associated skin damage  -no s/s of fistula or deeper wound, wound nursing to see for f/u     Chronic steroids use w adrenal insufficiency  -chronic prednisone 10 mg daily. H/o hyponatremia in past off steroids  -maintaining glucose, d/c checks. Some lower temps charted but appears controlled otherwise     Pyuria in setting of chronic Gerber - s/p 5 days ceftriaxone, suspect E coli colonization in retrospect after dx  review     Afib -rate controled, cont eliquis and bisoprolol for rate control  Relative hypotension in setting of chronic HTN, improved - hold nifedipine  Asthma -cont advair  HLD -cont lipitor  Chronic debility, BMI 37 - complicates daily care  Hypokalemia, likely 2/2 diarrhea - replace per protocol  Hypomagnesemia - replace per protocol    Patient is ready to be discharged home and will be transported by ambulance at 2:45P today.  Patient will be discharging home to hospice.  Discharge follow-up recommendations are listed below.    Discharge Follow Up Recommendations for outpatient labs/diagnostics:  -- Follow-up with hospice provider   --Clean patient's bottom and perineum avoiding pressure injuries and/or dressings to left ischial tuberosity and right gluteal with pH balanced foaming cleanser and barrier wipes, pat dry.  Apply dusting of stoma powder if needed to moist areas then apply thick layer of Desitin cream BID and as needed after any incontinence episodes.  Cover Desitin with plastic film to prevent further excoriation and to keep Desitin barrier cream in place.   --Hip and thoracic spine wounds: Cleanse with normal saline, pat dry, apply skin protectant to periwound skin, gently and loosely fill wound bed with [place] sheet cut to shape of ribbon, and cover with silicone foam border dressing Q3days and PRN for soiling.  Day of Discharge     HPI:   Patient states that she is happy to be going home today.  Eating well.  No adverse events overnight per nursing.  No complaints of diarrhea today.    Review of Systems  Gen- No fevers, chills  CV- No chest pain, palpitations  Resp- No cough, dyspnea  GI- No N/V/D, abd pain  All other systems have been reviewed and the pertinent positives and negatives have been listed above in the HPI or ROS      Vital Signs:   Temp:  [96.3 °F (35.7 °C)-98 °F (36.7 °C)] 97 °F (36.1 °C)  Heart Rate:  [67-83] 81  Resp:  [16-18] 16  BP: (112-149)/() 123/79  Flow (L/min):   [2] 2      Physical Exam:  Constitutional: Alert, elderly obese female sitting up in bed in NAD  Eyes: EOMI, sclerae anicteric, no conjunctival injection  Head: NCAT  ENT: [place], moist mucous membranes   Respiratory: Nonlabored, symmetrical chest expansion, CTAB, 97% 2 L  Cardiovascular: RRR, HR 87 no M/R/G, +DP pulses bilaterally  Gastrointestinal: Soft, NT, ND +BS  : Ayala draining clear yellow urine  Musculoskeletal: GONZALEZ; no LE edema bilaterally  Neurologic: Oriented x4, strength symmetric in all extremities but weak, follows all commands, speech clear  Skin: No rashes on exposed skin  Psychiatric: Pleasant and cooperative; normal affect      Pertinent  and/or Most Recent Results     LAB RESULTS:      Lab 03/04/23  0407 03/03/23  0424 03/02/23  1937 03/02/23  1229   WBC 7.38 7.32  --   --    HEMOGLOBIN 10.6* 9.8*  --   --    HEMATOCRIT 33.4* 31.4*  --   --    PLATELETS 200 163  --   --    MCV 89.5 89.0  --   --    LACTATE  --   --  1.5 2.2*         Lab 03/05/23  0603 03/04/23  0407 03/03/23  0424 03/02/23  1229   SODIUM 138 136 133*  --    POTASSIUM 4.1 3.4* 3.8 4.1   CHLORIDE 105 101 102  --    CO2 23.0 24.0 24.0  --    ANION GAP 10.0 11.0 7.0  --    BUN 10 7* 10  --    CREATININE 0.25* 0.25* 0.26*  --    EGFR 112.2 112.2 111.2  --    GLUCOSE 67 65 72  --    CALCIUM 7.7* 7.4* 7.6*  --                          Brief Urine Lab Results  (Last result in the past 365 days)      Color   Clarity   Blood   Leuk Est   Nitrite   Protein   CREAT   Urine HCG        03/07/23 0945 Yellow   Turbid   Moderate (2+)   Small (1+)   Negative   30 mg/dL (1+)               Microbiology Results (last 10 days)     Procedure Component Value - Date/Time    Urine Culture - Urine, Indwelling Urethral Catheter [[id number]]  (Normal) Collected: 03/03/23 1559    Lab Status: Final result Specimen: Urine from Indwelling Urethral Catheter Updated: 03/04/23 2033     Urine Culture No growth    Clostridioides difficile Toxin - Stool, Per Rectum  [[id number]]  (Abnormal) Collected: 03/02/23 1828    Lab Status: Final result Specimen: Stool from Per Rectum Updated: 03/02/23 2033    Narrative:      The following orders were created for panel order Clostridioides difficile Toxin - Stool, Per Rectum.  Procedure                               Abnormality         Status                     ---------                               -----------         ------                     Clostridioides difficile...[[id number]]  Abnormal            Final result                 Please view results for these tests on the individual orders.    Clostridioides difficile Toxin, PCR - Stool, Per Rectum [[id number]]  (Abnormal) Collected: 03/02/23 1828    Lab Status: Final result Specimen: Stool from Per Rectum Updated: 03/02/23 2033     C. Difficile Toxins by PCR Detected    Narrative:      DNA from a toxigenic strain of C.difficile has been detected. Antigen testing for the presence of free C.difficile toxin is currently in progress, to help determine the clinical significance of this PCR result.     Gastrointestinal Panel, PCR - Stool, Per Rectum [[id number]]  (Normal) Collected: 03/02/23 1828    Lab Status: Final result Specimen: Stool from Per Rectum Updated: 03/02/23 2030     Campylobacter Not Detected     Plesiomonas shigelloides Not Detected     Salmonella Not Detected     Vibrio Not Detected     Vibrio cholerae Not Detected     Yersinia enterocolitica Not Detected     Enteroaggregative E. coli (EAEC) Not Detected     Enteropathogenic E. coli (EPEC) Not Detected     Enterotoxigenic E. coli (ETEC) lt/st Not Detected     Shiga-like toxin-producing E. coli (STEC) stx1/stx2 Not Detected     Shigella/Enteroinvasive E. coli (EIEC) Not Detected     Cryptosporidium Not Detected     Cyclospora cayetanensis Not Detected     Entamoeba histolytica Not Detected     Giardia lamblia Not Detected     Adenovirus F40/41 Not Detected     Astrovirus Not Detected     Norovirus GI/GII Not Detected      Rotavirus A Not Detected     Sapovirus (I, II, IV or V) Not Detected    Clostridioides difficile toxin Ag, Reflex - Stool, Per Rectum [[id number]]  (Normal) Collected: 03/02/23 1828    Lab Status: Final result Specimen: Stool from Per Rectum Updated: 03/02/23 2106     C.diff Toxin Ag Negative    Narrative:      DNA from a toxigenic strain of C.difficile was detected, although the free toxin itself was not detected. These findings are consistent with C.difficile colonization and may not reflect actual C.difficile infection. Clinical correlation needed.    MRSA Screen, PCR (Inpatient) - Swab, Nares [[id number]]  (Abnormal) Collected: 03/02/23 0924    Lab Status: Final result Specimen: Swab from Nares Updated: 03/02/23 1115     MRSA PCR Positive    Narrative:      The negative predictive value of this diagnostic test is high and should only be used to consider de-escalating anti-MRSA therapy. A positive result may indicate colonization with MRSA and must be correlated clinically.    Urine Culture - Urine, Urine, Clean Catch [[id number]]  (Abnormal)  (Susceptibility) Collected: 03/01/23 2327    Lab Status: Edited Result - FINAL Specimen: Urine, Clean Catch Updated: 03/04/23 0940     Urine Culture >100,000 CFU/mL Mixed Aileen Isolated      >100,000 CFU/mL Escherichia coli    Narrative:      Specimen contains mixed organisms of questionable pathogenicity suggestive of contamination. If symptoms persist, suggest recollection.  Colonization of the urinary tract without infection is common. Treatment is discouraged unless the patient is symptomatic, pregnant, or undergoing an invasive urologic procedure.    Susceptibility      Escherichia coli      ELOINA      Ampicillin Resistant     Ampicillin + Sulbactam Intermediate      Cefazolin Susceptible      Cefepime Susceptible      Ceftazidime Susceptible      Ceftriaxone Susceptible      Gentamicin Susceptible      Levofloxacin Intermediate      Nitrofurantoin Susceptible       Piperacillin + Tazobactam Susceptible      Trimethoprim + Sulfamethoxazole Resistant                          Blood Culture - Blood, Hand, Left [[id number]]  (Normal) Collected: 03/01/23 2105    Lab Status: Final result Specimen: Blood from Hand, Left Updated: 03/06/23 2131     Blood Culture No growth at 5 days    Narrative:      Aerobic bottle only      Blood Culture - Blood, Arm, Left [[id number]]  (Normal) Collected: 03/01/23 2105    Lab Status: Final result Specimen: Blood from Arm, Left Updated: 03/06/23 2131     Blood Culture No growth at 5 days    Narrative:      Aerobic bottle only            CT Abdomen Pelvis With & Without Contrast    Result Date: 3/2/2023  CT ABDOMEN PELVIS W WO CONTRAST Date of Exam: 3/2/2023 2:46 PM EST Indication: UTI, recurrent/complicated (Female) Sepsis complicated sacral wound- eval for fistula. Comparison: None available. Technique: Axial CT images were obtained of the abdomen and pelvis before and after the uneventful intravenous administration of 95 mL Isovue-300. Sagittal and coronal reconstructions were performed.  Automated exposure control and iterative reconstruction methods were used. FINDINGS: Bibasilar infiltrates are noted. There is a small left pleural effusion. The findings suggest changes of bibasilar pneumonia. The liver, spleen, and pancreas are unremarkable. The gallbladder and bile ducts are unremarkable. The bilateral adrenal glands are symmetric and unremarkable. The bilateral kidneys are symmetric and unremarkable. There is no bowel dilatation or obstruction. A normal-appearing appendix is observed. There is no evidence for acute appendicitis. No significant free fluid is observed. No abnormal fluid collections are identified. No significant lymphadenopathy is seen  throughout the abdomen or pelvis. The bladder is decompressed with a Ayala catheter in place. The celiac and superior mesenteric arterial distributions are opacified without evidence for occlusion.  There is abnormal attenuation involving the soft tissues of the left buttock. The findings suggest changes of a developing decubitus wound. There may be associated soft tissue cellulitis. No well-defined fluid collection is seen to suggest abscess. There  is additional focal edema within the subcutaneous soft tissues at the left lateral aspect of the abdomen and pelvis. The findings may indicate an additional area of soft tissue cellulitis. Again no definitive well-defined fluid collection is seen to suggest abscess. No acute osseous abnormalities are observed.     1.Evidence for bibasilar pneumonia. 2.Evidence for a decubitus wound involving the left buttock soft tissues. There is evidence for associated soft tissue cellulitis. No definitive abscess is seen. There is no evidence for fistula. 3.Evidence for abnormal edema within the subcutaneous soft tissues overlying the left lateral aspect of the abdomen and pelvis. The findings may indicate changes of soft tissue cellulitis. No focal abscess is seen. Electronically Signed: Henry Dee  3/2/2023 3:33 PM EST  Workstation ID: [id number]    XR Chest 1 View    Result Date: 3/2/2023  XR CHEST 1 VW Date of Exam: 3/2/2023 5:44 PM EST Indication: pna on CT a/p?. Comparison: Abdomen pelvis CT scan of same date. Portable chest radiograph of 10/25/2022 Findings: Heart shadow is of normal size. The vasculature is cephalized, with appearance of mild perihilar edema. There is only mild visible left basilar discoid atelectasis and effusion. No pneumothorax is seen. There is a marked dextroconvex thoracic scoliosis.     Impression: 1. Mild pulmonary vascular congestion. 2. Mild left basilar atelectasis and trace effusion, less apparent than seen on earlier CT scan. Electronically Signed: Silas Dubose  3/2/2023 6:06 PM EST  Workstation ID: [id number]              Results for orders placed during the hospital encounter of 10/18/22    Adult Transthoracic Echo Complete W/ Cont if  Necessary Per Protocol    Interpretation Summary  •  Estimated left ventricular EF = 55% Left ventricular systolic function is normal.  •  Estimated right ventricular systolic pressure from tricuspid regurgitation is normal (<35 mmHg). Calculated right ventricular systolic pressure from tricuspid regurgitation is 28 mmHg.      Plan for Follow-up of Pending Labs/Results:     Discharge Details        Discharge Medications      New Medications      Instructions Start Date   castor oil-balsam peru ointment   1 application, Topical, Every 12 Hours Scheduled      zinc oxide 40 % paste paste  Commonly known as: DESITIN   1 application, Topical, Every 12 Hours Scheduled         Changes to Medications      Instructions Start Date   predniSONE 10 MG tablet  Commonly known as: DELTASONE  What changed:   · medication strength  · how much to take  · when to take this   10 mg, Oral, Daily With Breakfast   Start Date: March 10, 2023        Continue These Medications      Instructions Start Date   albuterol sulfate  (90 Base) MCG/ACT inhaler  Commonly known as: PROVENTIL HFA;VENTOLIN HFA;PROAIR HFA   2 puffs, Inhalation, Every 4 Hours PRN      apixaban 5 MG tablet tablet  Commonly known as: ELIQUIS   5 mg, Oral, Every 12 Hours Scheduled      atorvastatin 20 MG tablet  Commonly known as: LIPITOR   1 tablet, Oral, Daily      B-12 2500 MCG sublingual tablet   2 tablets, Sublingual, Daily      bisacodyl 5 MG EC tablet  Commonly known as: DULCOLAX   10 mg, Oral, Daily      bisoprolol 10 MG tablet  Commonly known as: ZEBeta   10 mg, Oral, Every 24 Hours Scheduled      Fluticasone-Salmeterol 250-50 MCG/ACT DISKUS  Commonly known as: ADVAIR/WIXELA   1 puff, Inhalation, 2 Times Daily      ondansetron 4 MG tablet  Commonly known as: ZOFRAN   4 mg, Oral, Every 6 Hours PRN      simethicone 80 MG chewable tablet  Commonly known as: MYLICON   160 mg, Oral, 3 Times Daily PRN      vitamin b complex capsule capsule   1 capsule, Oral, Daily          Stop These Medications    estradiol 0.1 MG/GM vaginal cream  Commonly known as: ESTRACE     NIFEdipine CC 30 MG 24 hr tablet  Commonly known as: ADALAT CC     nitrofurantoin (macrocrystal-monohydrate) 100 MG capsule  Commonly known as: Macrobid     pantoprazole 40 MG EC tablet  Commonly known as: PROTONIX            Allergies   Allergen Reactions   • Macrobid [Nitrofurantoin] Nausea And Vomiting   • Naproxen Unknown (See Comments)   • Oxaprozin Hives   • Sulfa Antibiotics Unknown - Low Severity         Discharge Disposition:  Hospice/Home    Diet:  Hospital:  Diet Order   Procedures   • Diet: Regular/House Diet; Texture: Regular Texture (IDDSI 7); Fluid Consistency: Thin (IDDSI 0)       Activity:  Activity Instructions     Activity as Tolerated            Restrictions or Other Recommendations:  None       CODE STATUS:    Code Status and Medical Interventions:   Ordered at: 03/02/23 0919     Medical Intervention Limits:    NO intubation (DNI)     Code Status (Patient has no pulse and is not breathing):    No CPR (Do Not Attempt to Resuscitate)     Medical Interventions (Patient has pulse or is breathing):    Limited Support     Comments:    no ICU escalation     Release to patient:    Routine Release       Future Appointments   Date Time Provider Department Center   3/9/2023  2:45 PM EMS 2  [facility]       Additional Instructions for the Follow-ups that You Need to Schedule     Discharge Follow-up with Specified Provider: Follow-up with hospice provider   As directed      To: Follow-up with hospice provider               GLADIS Shay  03/09/23      Time Spent on Discharge:  I spent 45  minutes on this discharge activity which included: face-to-face encounter with the patient, reviewing the data in the system, coordination of the care with the nursing staff as well as consultants, documentation, and entering orders.

## 2023-03-09 NOTE — THERAPY TREATMENT NOTE
Patient Name: Sol Lovelace  : 1940    MRN: [id number]                              Today's Date: 3/9/2023       Admit Date: 3/1/2023    Visit Dx: No diagnosis found.  Patient Active Problem List   Diagnosis   • E. coli UTI   • Hyponatremia   • Elevated liver enzymes   • Hypoalbuminemia   • Leukocytosis   • Proteinuria   • Paroxysmal atrial fibrillation (HCC)   • Essential hypertension   • Acute urinary retention   • Current chronic use of systemic steroids   • Adrenal insufficiency due to corticosteroid withdrawal ([facility])   • Severe obesity (BMI 35.0-35.9 with comorbidity) ([facility])   • Moderate asthma with exacerbation   • Diarrhea   • Buttock wound   • Chronic indwelling Ayala catheter     Past Medical History:   Diagnosis Date   • Asthma    • Chronic back pain    • GERD (gastroesophageal reflux disease)    • Hyperlipidemia    • Hypertension    • Osteoarthritis    • Rheumatoid arthritis ([facility])      Past Surgical History:   Procedure Laterality Date   • LAPAROSCOPIC VAGINAL HYSTERECTOMY      BSO, bladder suspension      General Information     Row Name 23          Physical Therapy Time and Intention    Document Type therapy note (daily note)  -NS     Mode of Treatment individual therapy;physical therapy  -NS     Row Name 23          General Information    Patient Profile Reviewed yes  -NS     Existing Precautions/Restrictions fall;other (see comments)  gluteal and upper back pressure wounds, decreased skin integrity, confusion  -NS     Row Name 23          Cognition    Orientation Status (Cognition) oriented x 3  situational confusion  -NS     Row Name 23          Safety Issues, Functional Mobility    Safety Issues Affecting Function (Mobility) insight into deficits/self-awareness;sequencing abilities  -NS     Impairments Affecting Function (Mobility) balance;cognition;coordination;endurance/activity tolerance;strength;motor planning;range of motion (ROM)  -NS            User Key  (r) = Recorded By, (t) = Taken By, (c) = Cosigned By    Initials Name Provider Type    Ness Hinson PT Physical Therapist               Mobility     Row Name 03/09/23 07          Bed Mobility    Comment, (Bed Mobility) Pt declined practicing rolling despite education/encouragement.  -NS     Row Name 03/09/23 0740          Transfers    Comment, (Transfers) Session focused on activities to do independently, no family present.  -NS           User Key  (r) = Recorded By, (t) = Taken By, (c) = Cosigned By    Initials Name Provider Type    Ness Hinson PT Physical Therapist               Obj/Interventions     Row Name 03/09/23 0740          Motor Skills    Therapeutic Exercise hip;knee;ankle  elbow flex/ext, wrist flex/ext x10  -NS     Row Name 03/09/23 0740          Shoulder (Therapeutic Exercise)    Shoulder AROM (Therapeutic Exercise) bilateral;flexion;aBduction;aDduction;10 repetitions  -NS     Row Name 03/09/23 0740          Hip (Therapeutic Exercise)    Hip (Therapeutic Exercise) isometric exercises;strengthening exercise  -NS     Hip Isometrics (Therapeutic Exercise) bilateral;gluteal sets;10 repetitions  -NS     Hip Strengthening (Therapeutic Exercise) bilateral;external rotation;internal rotation;10 repetitions  -NS     Row Name 03/09/23 0740          Knee (Therapeutic Exercise)    Knee (Therapeutic Exercise) isometric exercises  -NS     Knee Isometrics (Therapeutic Exercise) bilateral;quad sets;10 repetitions  -[facility] Name 03/09/23 0740          Ankle (Therapeutic Exercise)    Ankle (Therapeutic Exercise) AROM (active range of motion)  -NS     Ankle AROM (Therapeutic Exercise) bilateral;dorsiflexion;plantarflexion;10 repetitions  -NS     Row Name 03/09/23 0740          Balance    Comment, Balance HOB elevated fully- pt practiced reaching outside LATRELL for targets, lateral trunk rotations, and mini crunches to facilitate core activation to prepare for rolling.  -NS           User Key  (r)  = Recorded By, (t) = Taken By, (c) = Cosigned By    Initials Name Provider Type    Ness Hinson, PT Physical Therapist               Goals/Plan    No documentation.                Clinical Impression     Row Name 03/09/23 0740          Pain    Pretreatment Pain Rating 0/10 - no pain  -NS     Posttreatment Pain Rating 0/10 - no pain  -NS     Row Name 03/09/23 0740          Plan of Care Review    Plan of Care Reviewed With patient  -NS     Progress improving  -NS     Outcome Evaluation Patient continues to be limited by weakness, decreased activity tolerance, and deficits in trunk control. Session focused on tasks for patient to complete independently, including UE/LE HEP and core activation activities. Will continue per PT POC to support return to baseline.  -NS     Row Name 03/09/23 0740          Vital Signs    Pre Systolic BP Rehab --  VSS- RN cleared for PT  -NS     Pre Patient Position Supine  -NS     Intra Patient Position Sitting  -NS     Post Patient Position Sitting  -NS     Row Name 03/09/23 0740          Positioning and Restraints    Pre-Treatment Position in bed  -NS     Post Treatment Position bed  -NS     In Bed notified nsg;fowlers;call light within reach;encouraged to call for assist;exit alarm on;side rails up x3;heels elevated  -NS           User Key  (r) = Recorded By, (t) = Taken By, (c) = Cosigned By    Initials Name Provider Type    Ness Hinson, ELVIS Physical Therapist               Outcome Measures     Row Name 03/09/23 0740          How much help from another person do you currently need...    Turning from your back to your side while in flat bed without using bedrails? 2  -NS     Moving from lying on back to sitting on the side of a flat bed without bedrails? 1  -NS     Moving to and from a bed to a chair (including a wheelchair)? 1  -NS     Standing up from a chair using your arms (e.g., wheelchair, bedside chair)? 1  -NS     Climbing 3-5 steps with a railing? 1  -NS     To walk in  hospital room? 1  -NS     AM-PAC 6 Clicks Score (PT) 7  -NS     Highest level of mobility 2 --> Bed activities/dependent transfer  -NS     Row Name 03/09/23 0740          Functional Assessment    Outcome Measure Options AM-PAC 6 Clicks Basic Mobility (PT)  -NS           User Key  (r) = Recorded By, (t) = Taken By, (c) = Cosigned By    Initials Name Provider Type    Ness Hinson PT Physical Therapist                             Physical Therapy Education     Title: PT OT SLP Therapies (In Progress)     Topic: Physical Therapy (Done)     Point: Mobility training (Done)     Learning Progress Summary           Patient Acceptance, E, VU,NR by NS at 3/9/2023 0828    Comment: UE/LE HEP, importance of rolling for pressure relief    Acceptance, E, VU,NR by  at 3/8/2023 1125    Acceptance, E, VU,NR by NS at 3/7/2023 1404    Acceptance, E, VU,NR by NS at 3/6/2023 1334    Acceptance, E, VU by KR at 3/2/2023 0914                   Point: Home exercise program (Done)     Learning Progress Summary           Patient Acceptance, E, VU,NR by NS at 3/9/2023 0828    Comment: UE/LE HEP, importance of rolling for pressure relief    Acceptance, E, VU,NR by  at 3/8/2023 1125    Acceptance, E, VU,NR by NS at 3/7/2023 1404    Acceptance, E, VU,NR by NS at 3/6/2023 1334    Acceptance, E, VU by KR at 3/2/2023 0914                   Point: Body mechanics (Done)     Learning Progress Summary           Patient Acceptance, E, VU,NR by NS at 3/9/2023 0828    Comment: UE/LE HEP, importance of rolling for pressure relief    Acceptance, E, VU,NR by  at 3/8/2023 1125    Acceptance, E, VU,NR by NS at 3/7/2023 1404    Acceptance, E, VU,NR by NS at 3/6/2023 1334    Acceptance, E, VU by KR at 3/2/2023 0914                   Point: Precautions (Done)     Learning Progress Summary           Patient Acceptance, E, VU,NR by NS at 3/9/2023 0828    Comment: UE/LE HEP, importance of rolling for pressure relief    Acceptance, E, VU,NR by  at 3/8/2023  1125    Acceptance, E, VU,NR by NS at 3/7/2023 1404    Acceptance, E, VU,NR by NS at 3/6/2023 1334    Acceptance, E, VU by KR at 3/2/2023 0914                               User Key     Initials Effective Dates Name Provider Type Discipline    NS 06/16/21 -  Ness Murguia, PT Physical Therapist PT    LH 09/21/21 -  Gopi Almaguer PT Physical Therapist PT    KR 12/30/22 -  Kristel Albright PT Physical Therapist PT              PT Recommendation and Plan     Plan of Care Reviewed With: patient  Progress: improving  Outcome Evaluation: Patient continues to be limited by weakness, decreased activity tolerance, and deficits in trunk control. Session focused on tasks for patient to complete independently, including UE/LE HEP and core activation activities. Will continue per PT POC to support return to baseline.     Time Calculation:    PT Charges     Row Name 03/09/23 0740             Time Calculation    Start Time 0740  -NS      PT Received On 03/09/23  -NS      PT Goal Re-Cert Due Date 03/12/23  -NS         Timed Charges    99525 - PT Therapeutic Exercise Minutes 19  -NS      84595 - PT Therapeutic Activity Minutes 10  -NS         Total Minutes    Timed Charges Total Minutes 29  -NS       Total Minutes 29  -NS            User Key  (r) = Recorded By, (t) = Taken By, (c) = Cosigned By    Initials Name Provider Type    NS Ness Murguia, PT Physical Therapist              Therapy Charges for Today     Code Description Service Date Service Provider Modifiers Qty    11694375989 HC PT THERAPEUTIC ACT EA 15 MIN 3/9/2023 Ness Murguia, PT GP 1    20518211218 HC PT THER PROC EA 15 MIN 3/9/2023 Ness Murguia, PT GP 1          PT G-Codes  Outcome Measure Options: AM-PAC 6 Clicks Basic Mobility (PT)  AM-PAC 6 Clicks Score (PT): 7  AM-PAC 6 Clicks Score (OT): 11       Ness Murguia PT  3/9/2023

## 2023-03-09 NOTE — PROGRESS NOTES
Continued Stay Note  [facility]     Patient Name: Sol Lovelace  MRN: [id number]  Today's Date: 3/9/2023    Admit Date: 3/1/2023    Plan: Home with [facility]   Discharge Plan     Row Name 03/09/23 1213       Plan    Plan Home with [facility]    Final Discharge Disposition Code 50 - home with hospice    Final Note Plan remains for pt to discharge home today with [facility]. Met with pt's daughter Stephanie to sign the EMS/DNR form, form signed, PCS and EMS/DNR forms placed on pt's chartlet. Ambulance  scheduled for 1445. Daughter has the hospice 24 hr number to call to initiate hospice services when pt arrives home. Please call [phone] if can be of further assistance.               Discharge Codes    No documentation.               Expected Discharge Date and Time     Expected Discharge Date Expected Discharge Time    Mar 9, 2023             Marcy Ramirez RN

## 2023-03-09 NOTE — NURSING NOTE
WOC follow up for suspected DTPI to patient thoracic spine.    Wound measures approximately 0.4x0.3x0.3 cm.  There is a area of slough on the lateral aspect of the wound bed.  The wound bed is pink, blanchable and bleeding when palpated. Hemostasis achieved. Could be a nevi that has been inadvertently removed vs a small abscess. Wound edges are irregular and does not match characteristics of a pressure injury.  Etiology unknown. No purulent drainage noted. Small nevi noted superior/lateral to wound. Periwound skin pink, intact and blanchable when compared to previous photo. Recommend applying a small dab of therahoney to the wound bed and covering with an optfioam dressing.  See wound care orders. Pt to be discharged today with hospice care at home.         Sign off.    Stephen Upton RN, BSN, CCRN, CWOCN  Wound, Ostomy and Continence (WOC) Department  [facility]